# Patient Record
Sex: MALE | Race: BLACK OR AFRICAN AMERICAN | Employment: OTHER | URBAN - METROPOLITAN AREA
[De-identification: names, ages, dates, MRNs, and addresses within clinical notes are randomized per-mention and may not be internally consistent; named-entity substitution may affect disease eponyms.]

---

## 2021-05-06 ENCOUNTER — HOSPITAL ENCOUNTER (OUTPATIENT)
Dept: PREADMISSION TESTING | Age: 71
Discharge: HOME | End: 2021-05-06
Payer: MEDICARE

## 2021-05-06 VITALS
HEART RATE: 76 BPM | BODY MASS INDEX: 28.09 KG/M2 | TEMPERATURE: 97.9 F | OXYGEN SATURATION: 98 % | SYSTOLIC BLOOD PRESSURE: 132 MMHG | DIASTOLIC BLOOD PRESSURE: 78 MMHG | RESPIRATION RATE: 14 BRPM | WEIGHT: 179 LBS | HEIGHT: 67 IN

## 2021-05-06 LAB
25(OH)D3 SERPL-MCNC: 16.6 NG/ML (ref 30–100)
ABO + RH BLD: NORMAL
ALBUMIN SERPL-MCNC: 4.5 G/DL (ref 3.5–5)
ALBUMIN/GLOB SERPL: 1.5 {RATIO} (ref 1.1–2.2)
ALP SERPL-CCNC: 43 U/L (ref 45–117)
ALT SERPL-CCNC: 66 U/L (ref 12–78)
ANION GAP SERPL CALC-SCNC: 5 MMOL/L (ref 5–15)
APPEARANCE UR: CLEAR
APTT PPP: 23.5 SEC (ref 22.1–31)
AST SERPL-CCNC: 39 U/L (ref 15–37)
BACTERIA URNS QL MICRO: NEGATIVE /HPF
BASOPHILS # BLD: 0 K/UL (ref 0–0.1)
BASOPHILS NFR BLD: 1 % (ref 0–1)
BILIRUB SERPL-MCNC: 0.5 MG/DL (ref 0.2–1)
BILIRUB UR QL: NEGATIVE
BLOOD GROUP ANTIBODIES SERPL: NORMAL
BUN SERPL-MCNC: 18 MG/DL (ref 6–20)
BUN/CREAT SERPL: 16 (ref 12–20)
CALCIUM SERPL-MCNC: 9.2 MG/DL (ref 8.5–10.1)
CHLORIDE SERPL-SCNC: 106 MMOL/L (ref 97–108)
CO2 SERPL-SCNC: 27 MMOL/L (ref 21–32)
COLOR UR: ABNORMAL
CREAT SERPL-MCNC: 1.1 MG/DL (ref 0.7–1.3)
DIFFERENTIAL METHOD BLD: ABNORMAL
EOSINOPHIL # BLD: 0.1 K/UL (ref 0–0.4)
EOSINOPHIL NFR BLD: 2 % (ref 0–7)
EPITH CASTS URNS QL MICRO: ABNORMAL /LPF
ERYTHROCYTE [DISTWIDTH] IN BLOOD BY AUTOMATED COUNT: 13.6 % (ref 11.5–14.5)
EST. AVERAGE GLUCOSE BLD GHB EST-MCNC: 103 MG/DL
GLOBULIN SER CALC-MCNC: 3 G/DL (ref 2–4)
GLUCOSE SERPL-MCNC: 111 MG/DL (ref 65–100)
GLUCOSE UR STRIP.AUTO-MCNC: NEGATIVE MG/DL
HBA1C MFR BLD: 5.2 % (ref 4–5.6)
HCT VFR BLD AUTO: 35.3 % (ref 36.6–50.3)
HGB BLD-MCNC: 11.3 G/DL (ref 12.1–17)
HGB UR QL STRIP: NEGATIVE
IMM GRANULOCYTES # BLD AUTO: 0 K/UL (ref 0–0.04)
IMM GRANULOCYTES NFR BLD AUTO: 0 % (ref 0–0.5)
INR PPP: 1.1 (ref 0.9–1.1)
KETONES UR QL STRIP.AUTO: NEGATIVE MG/DL
LEUKOCYTE ESTERASE UR QL STRIP.AUTO: NEGATIVE
LYMPHOCYTES # BLD: 1.2 K/UL (ref 0.8–3.5)
LYMPHOCYTES NFR BLD: 26 % (ref 12–49)
MCH RBC QN AUTO: 31.3 PG (ref 26–34)
MCHC RBC AUTO-ENTMCNC: 32 G/DL (ref 30–36.5)
MCV RBC AUTO: 97.8 FL (ref 80–99)
MONOCYTES # BLD: 0.5 K/UL (ref 0–1)
MONOCYTES NFR BLD: 11 % (ref 5–13)
NEUTS SEG # BLD: 2.6 K/UL (ref 1.8–8)
NEUTS SEG NFR BLD: 60 % (ref 32–75)
NITRITE UR QL STRIP.AUTO: NEGATIVE
NRBC # BLD: 0 K/UL (ref 0–0.01)
NRBC BLD-RTO: 0 PER 100 WBC
PH UR STRIP: 5.5 [PH] (ref 5–8)
PLATELET # BLD AUTO: 243 K/UL (ref 150–400)
PMV BLD AUTO: 10.4 FL (ref 8.9–12.9)
POTASSIUM SERPL-SCNC: 4.6 MMOL/L (ref 3.5–5.1)
PROT SERPL-MCNC: 7.5 G/DL (ref 6.4–8.2)
PROT UR STRIP-MCNC: ABNORMAL MG/DL
PROTHROMBIN TIME: 10.9 SEC (ref 9–11.1)
RBC # BLD AUTO: 3.61 M/UL (ref 4.1–5.7)
RBC #/AREA URNS HPF: ABNORMAL /HPF (ref 0–5)
SODIUM SERPL-SCNC: 138 MMOL/L (ref 136–145)
SP GR UR REFRACTOMETRY: 1.02 (ref 1–1.03)
SPECIMEN EXP DATE BLD: NORMAL
THERAPEUTIC RANGE,PTTT: NORMAL SECS (ref 58–77)
UA: UC IF INDICATED,UAUC: ABNORMAL
UROBILINOGEN UR QL STRIP.AUTO: 0.2 EU/DL (ref 0.2–1)
WBC # BLD AUTO: 4.4 K/UL (ref 4.1–11.1)
WBC URNS QL MICRO: ABNORMAL /HPF (ref 0–4)

## 2021-05-06 PROCEDURE — 86901 BLOOD TYPING SEROLOGIC RH(D): CPT

## 2021-05-06 PROCEDURE — 82306 VITAMIN D 25 HYDROXY: CPT

## 2021-05-06 PROCEDURE — 85610 PROTHROMBIN TIME: CPT

## 2021-05-06 PROCEDURE — 36415 COLL VENOUS BLD VENIPUNCTURE: CPT

## 2021-05-06 PROCEDURE — 83036 HEMOGLOBIN GLYCOSYLATED A1C: CPT

## 2021-05-06 PROCEDURE — 85730 THROMBOPLASTIN TIME PARTIAL: CPT

## 2021-05-06 PROCEDURE — 80053 COMPREHEN METABOLIC PANEL: CPT

## 2021-05-06 PROCEDURE — 85025 COMPLETE CBC W/AUTO DIFF WBC: CPT

## 2021-05-06 PROCEDURE — 81001 URINALYSIS AUTO W/SCOPE: CPT

## 2021-05-06 RX ORDER — CYCLOBENZAPRINE HCL 5 MG
5 TABLET ORAL
COMMUNITY
End: 2021-09-08

## 2021-05-06 RX ORDER — CHOLECALCIFEROL (VITAMIN D3) 50 MCG
1 CAPSULE ORAL DAILY
COMMUNITY

## 2021-05-06 RX ORDER — BISMUTH SUBSALICYLATE 262 MG
1 TABLET,CHEWABLE ORAL DAILY
COMMUNITY

## 2021-05-06 RX ORDER — FINASTERIDE 5 MG/1
5 TABLET, FILM COATED ORAL DAILY
COMMUNITY

## 2021-05-06 RX ORDER — ALLOPURINOL 300 MG/1
600 TABLET ORAL DAILY
COMMUNITY

## 2021-05-06 RX ORDER — SPIRONOLACTONE 25 MG/1
25 TABLET ORAL DAILY
COMMUNITY

## 2021-05-06 RX ORDER — CHOLECALCIFEROL (VITAMIN D3) 125 MCG
1 CAPSULE ORAL
COMMUNITY
End: 2021-05-26

## 2021-05-06 RX ORDER — GARLIC 1000 MG
1 CAPSULE ORAL DAILY
COMMUNITY

## 2021-05-06 RX ORDER — LISINOPRIL 40 MG/1
40 TABLET ORAL DAILY
COMMUNITY
End: 2021-09-08

## 2021-05-06 RX ORDER — DOXAZOSIN 8 MG/1
8 TABLET ORAL DAILY
COMMUNITY

## 2021-05-06 NOTE — PERIOP NOTES
It is now mandated that all surgical patients be tested for COVID-19 exactly 4 days prior to surgery. Your COVID test date is Thursday, 5/13/21, 8am-11am.      You must bring a photo ID. COVID testing will be performed curbside in the [address] [facility] lot. There are signs leading you to the testing site. Please self-quarantine at home after testing and prior to your surgery date. You will only be notified if your results are positive. For Your Information:    · Coronavirus (COVID-19) affects different people in different ways  · It also appears with a wide range of symptoms from mild to severe  · Signs appear 2-14 days after exposure     · If you develop any of the following, notify your doctor immediately:  ? Fever  ? Chills, with or without a shiver  ? Muscle pain  ? Headache  ? Sore throat  ? Dry cough  ? New loss of taste or smell  ? Tiredness     ·  If you develop any of the following, call 911:  ? Shortness of breath  ? Difficulty breathing  ? Chest pain  ? New confusion  ?  Blueness of fingers and/or lips
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
## 2021-05-06 NOTE — PERIOP NOTES
[address] [address] [facility]  PRE-ADMISSION TESTING    [phone]     Surgery Date:   Monday, 5/17/21      [facility] staff will call you between 3 and 7pm the Friday before your surgery with your arrival time. Call [phone] after 7pm Friday if you did not receive this call. INSTRUCTIONS BEFORE YOUR SURGERY   When You  Arrive Please proceed to the 2nd Floor Admitting Desk on the day of your surgery  Have your insurance card, photo ID, and any copayment (if needed)   Food   and   Drink NO food or drink after midnight the night before surgery    This means NO water, gum, mints, coffee, juice, etc.  No alcohol (beer, wine, liquor) 24 hours before or after surgery   Medications to   TAKE   Morning of Surgery    Allopurinol, doxazosin, finasteride   If needed, cyclobenzaprine    Tylenol/acetaminophen products may be taken for pain, if needed   Medications  To  STOP  Monday  5/10/21  Non-Steroidal Anti-Inflammatory Drugs (NSAIDs): Ibuprofen (Advil, Motrin), Naproxen (Aleve)   Aspirin containing products for pain    Herbal supplements, vitamins, and fish oil   Other: This includes your vitamin E & garlic     Special Medication Instruction Do not take your lisinopril or spironolactone the morning of surgery   Bathing Clothing  Jewelry  Valuables      If you shower the morning of surgery, please do not apply anything to your skin (lotions, powders, deodorant, or makeup, especially mascara).  Follow Chlorhexidine Care Fusion body wash instructions provided to you during PAT appointment. Begin 3 days prior to surgery.  Do not shave or trim any body part 24 hours before surgery.  Leave money, valuables, and jewelry, including body piercings, at home.  Wear loose, comfortable clothes. Spending the Night Your doctor is keeping you in the hospital after surgery, leave personal belongings/luggage in your vehicle.  Your support person may bring it up to you after you are settled in your post-operative room. Hospital discharge is noon. Have your  arrive the morning of your discharge. Special Instructions If a situation occurs and you are delayed the day of surgery, call [phone]. If your physical condition changes (like a fever, cold, flu, etc.) call your surgeon. Home medication(s) reviewed and verified with patient's written list during PAT appointment. The patient and caregiver was contacted  in person. The patient verbalizes understanding of all instructions and does not  need reinforcement.

## 2021-05-06 NOTE — PERIOP NOTES
Pt did not stop for ECG prior to leaving [facility] this afternoon. Next planned trip from [place] is 5/13/21 for C4 appt. Discussed possibility of completing ECG on that date since there's no cardiac hx w/TTaylor, NP. This should be acceptable. Pt & daughter agreeable. Will call to remind pt on 5/12/21. 5/12 @ 5162:  Left VM for daughter, Lindsey Esteves, who is bringing pt for C4 tomorrow.  Spoke w/pt yesterday, but wanted to touch base w/Romy to answer questions if she had any r/t directions, registration, etc.

## 2021-05-07 LAB
BACTERIA SPEC CULT: NORMAL
BACTERIA SPEC CULT: NORMAL
SERVICE CMNT-IMP: NORMAL

## 2021-05-07 RX ORDER — CHOLECALCIFEROL TAB 125 MCG (5000 UNIT) 125 MCG
10000 TAB ORAL DAILY
Qty: 60 TAB | Refills: 0 | Status: SHIPPED | OUTPATIENT
Start: 2021-05-07 | End: 2021-06-06

## 2021-05-10 RX ORDER — MUPIROCIN 20 MG/G
OINTMENT TOPICAL 2 TIMES DAILY
Qty: 22 G | Refills: 0 | Status: SHIPPED | OUTPATIENT
Start: 2021-05-10 | End: 2021-05-15

## 2021-05-10 NOTE — PROGRESS NOTES
Notification of Positive MSSA and Treatment Ordered by Preadmission Testing Nurse Practitioner      Patient Name:  Brandie Ray  MRN: [id number]  : 1950    Surgeon: Margarita Triana  Date of surgery: 21  Procedure: Lumbar Laminectomy    Allergies: No Known Allergies    MRSA results: All Micro Results     Procedure Component Value Units Date/Time    MRSA CULTURE NARES [[id number]] Collected: 21 1127    Order Status: Completed Specimen: Nasal from Nares Updated: 21 1442     Special Requests: NO SPECIAL REQUESTS        Culture result:       MRSA NOT PRESENT. Apparent Staphylococus aureus (not MRSA noted). Screening of patient nares for MRSA is for surveillance purposes and, if positive, to facilitate isolation considerations in high risk settings. It is not intended for automatic decolonization interventions per se as regimens are not sufficiently effective to warrant routine use.                 Treatment ordered: Bactroban ointment 2%- apply intranasal twice daily for 5 days    Date patient notified of results / treatment prescribed: 5/10/21    The patient was instructed to add medication and date they began treatment to their \"Prior to Admission Medication\" list given to them in [address], NP

## 2021-05-11 NOTE — PROGRESS NOTES
Called patient to remind him to stop by for ECG on 5/13 while he is on campus for C4 appt. Per technician request, I asked that he register on the 1st floor prior to going back for ECG. Pt verbalized understanding.

## 2021-05-13 ENCOUNTER — HOSPITAL ENCOUNTER (OUTPATIENT)
Dept: NON INVASIVE DIAGNOSTICS | Age: 71
Discharge: HOME | End: 2021-05-13

## 2021-05-13 ENCOUNTER — HOSPITAL ENCOUNTER (OUTPATIENT)
Dept: PREADMISSION TESTING | Age: 71
Discharge: HOME | End: 2021-05-13
Payer: MEDICARE

## 2021-05-13 PROCEDURE — 93005 ELECTROCARDIOGRAM TRACING: CPT

## 2021-05-13 PROCEDURE — U0003 INFECTIOUS AGENT DETECTION BY NUCLEIC ACID (DNA OR RNA); SEVERE ACUTE RESPIRATORY SYNDROME CORONAVIRUS 2 (SARS-COV-2) (CORONAVIRUS DISEASE [COVID-19]), AMPLIFIED PROBE TECHNIQUE, MAKING USE OF HIGH THROUGHPUT TECHNOLOGIES AS DESCRIBED BY CMS-2020-01-R: HCPCS

## 2021-05-14 ENCOUNTER — ANESTHESIA EVENT (OUTPATIENT)
Dept: SURGERY | Age: 71
DRG: 454 | End: 2021-05-14
Payer: MEDICARE

## 2021-05-14 LAB
ATRIAL RATE: 69 BPM
CALCULATED P AXIS, ECG09: 67 DEGREES
CALCULATED R AXIS, ECG10: 43 DEGREES
CALCULATED T AXIS, ECG11: 54 DEGREES
DIAGNOSIS, 93000: NORMAL
P-R INTERVAL, ECG05: 150 MS
Q-T INTERVAL, ECG07: 402 MS
QRS DURATION, ECG06: 80 MS
QTC CALCULATION (BEZET), ECG08: 430 MS
SARS-COV-2, COV2NT: NOT DETECTED
VENTRICULAR RATE, ECG03: 69 BPM

## 2021-05-14 NOTE — PERIOP NOTES
Called patient to verify he had received his nasal swab results and received medication instructions regarding treatment. Patient states he was notified and started treatment Monday, 5/10.

## 2021-05-17 ENCOUNTER — APPOINTMENT (OUTPATIENT)
Dept: GENERAL RADIOLOGY | Age: 71
DRG: 454 | End: 2021-05-17
Attending: ORTHOPAEDIC SURGERY
Payer: MEDICARE

## 2021-05-17 ENCOUNTER — ANESTHESIA (OUTPATIENT)
Dept: SURGERY | Age: 71
DRG: 454 | End: 2021-05-17
Payer: MEDICARE

## 2021-05-17 ENCOUNTER — HOSPITAL ENCOUNTER (INPATIENT)
Age: 71
LOS: 9 days | Discharge: HOME HEALTH | DRG: 454 | End: 2021-05-26
Attending: ORTHOPAEDIC SURGERY | Admitting: ORTHOPAEDIC SURGERY
Payer: MEDICARE

## 2021-05-17 DIAGNOSIS — G89.18 POST-OP PAIN: Primary | ICD-10-CM

## 2021-05-17 PROBLEM — M48.062 LUMBAR STENOSIS WITH NEUROGENIC CLAUDICATION: Status: ACTIVE | Noted: 2021-05-17

## 2021-05-17 PROCEDURE — 77030008684 HC TU ET CUF COVD -B: Performed by: NURSE ANESTHETIST, CERTIFIED REGISTERED

## 2021-05-17 PROCEDURE — 74011250637 HC RX REV CODE- 250/637: Performed by: ORTHOPAEDIC SURGERY

## 2021-05-17 PROCEDURE — 74011250636 HC RX REV CODE- 250/636: Performed by: NURSE ANESTHETIST, CERTIFIED REGISTERED

## 2021-05-17 PROCEDURE — 76060000039 HC ANESTHESIA 4 TO 4.5 HR: Performed by: ORTHOPAEDIC SURGERY

## 2021-05-17 PROCEDURE — C1821 INTERSPINOUS IMPLANT: HCPCS | Performed by: ORTHOPAEDIC SURGERY

## 2021-05-17 PROCEDURE — 77030005513 HC CATH URETH FOL11 MDII -B: Performed by: ORTHOPAEDIC SURGERY

## 2021-05-17 PROCEDURE — 77030018723 HC ELCTRD BLD COVD -A: Performed by: ORTHOPAEDIC SURGERY

## 2021-05-17 PROCEDURE — 77030026188 HC BN CANC CHP CRSH PR LIFV -E: Performed by: ORTHOPAEDIC SURGERY

## 2021-05-17 PROCEDURE — 74011250636 HC RX REV CODE- 250/636: Performed by: ORTHOPAEDIC SURGERY

## 2021-05-17 PROCEDURE — 77030026438 HC STYL ET INTUB CARD -A: Performed by: NURSE ANESTHETIST, CERTIFIED REGISTERED

## 2021-05-17 PROCEDURE — 77030019908 HC STETH ESOPH SIMS -A: Performed by: NURSE ANESTHETIST, CERTIFIED REGISTERED

## 2021-05-17 PROCEDURE — C1713 ANCHOR/SCREW BN/BN,TIS/BN: HCPCS | Performed by: ORTHOPAEDIC SURGERY

## 2021-05-17 PROCEDURE — 77030033067 HC SUT PDO STRATFX SPIR J&J -B: Performed by: ORTHOPAEDIC SURGERY

## 2021-05-17 PROCEDURE — 00NY0ZZ RELEASE LUMBAR SPINAL CORD, OPEN APPROACH: ICD-10-PCS | Performed by: ORTHOPAEDIC SURGERY

## 2021-05-17 PROCEDURE — 77030002982 HC SUT POLYSRB J&J -A: Performed by: ORTHOPAEDIC SURGERY

## 2021-05-17 PROCEDURE — 01NB0ZZ RELEASE LUMBAR NERVE, OPEN APPROACH: ICD-10-PCS | Performed by: ORTHOPAEDIC SURGERY

## 2021-05-17 PROCEDURE — 77030037134 HC WRAP COMPR BACK THER SOLM -B

## 2021-05-17 PROCEDURE — C9290 INJ, BUPIVACAINE LIPOSOME: HCPCS | Performed by: ORTHOPAEDIC SURGERY

## 2021-05-17 PROCEDURE — 76210000017 HC OR PH I REC 1.5 TO 2 HR: Performed by: ORTHOPAEDIC SURGERY

## 2021-05-17 PROCEDURE — 00QT0ZZ REPAIR SPINAL MENINGES, OPEN APPROACH: ICD-10-PCS | Performed by: ORTHOPAEDIC SURGERY

## 2021-05-17 PROCEDURE — 77030039267 HC ADH SKN EXOFIN S2SG -B: Performed by: ORTHOPAEDIC SURGERY

## 2021-05-17 PROCEDURE — 74011000250 HC RX REV CODE- 250: Performed by: ORTHOPAEDIC SURGERY

## 2021-05-17 PROCEDURE — 65270000029 HC RM PRIVATE

## 2021-05-17 PROCEDURE — 77030031139 HC SUT VCRL2 J&J -A: Performed by: ORTHOPAEDIC SURGERY

## 2021-05-17 PROCEDURE — 76010000175 HC OR TIME 4 TO 4.5 HR INTENSV-TIER 1: Performed by: ORTHOPAEDIC SURGERY

## 2021-05-17 PROCEDURE — 2709999900 HC NON-CHARGEABLE SUPPLY: Performed by: ORTHOPAEDIC SURGERY

## 2021-05-17 PROCEDURE — 0SG10AJ FUSION OF 2 OR MORE LUMBAR VERTEBRAL JOINTS WITH INTERBODY FUSION DEVICE, POSTERIOR APPROACH, ANTERIOR COLUMN, OPEN APPROACH: ICD-10-PCS | Performed by: ORTHOPAEDIC SURGERY

## 2021-05-17 PROCEDURE — 77030040466 HC GRFT MATRIX VIBONE REGE -I1: Performed by: ORTHOPAEDIC SURGERY

## 2021-05-17 PROCEDURE — 0SB20ZZ EXCISION OF LUMBAR VERTEBRAL DISC, OPEN APPROACH: ICD-10-PCS | Performed by: ORTHOPAEDIC SURGERY

## 2021-05-17 PROCEDURE — 77030002924 HC SUT GORTX WLGO -B: Performed by: ORTHOPAEDIC SURGERY

## 2021-05-17 PROCEDURE — 74011250636 HC RX REV CODE- 250/636: Performed by: ANESTHESIOLOGY

## 2021-05-17 PROCEDURE — 74011000250 HC RX REV CODE- 250: Performed by: NURSE ANESTHETIST, CERTIFIED REGISTERED

## 2021-05-17 PROCEDURE — 77030013079 HC BLNKT BAIR HGGR 3M -A: Performed by: NURSE ANESTHETIST, CERTIFIED REGISTERED

## 2021-05-17 PROCEDURE — 74011250636 HC RX REV CODE- 250/636: Performed by: PHYSICIAN ASSISTANT

## 2021-05-17 PROCEDURE — 77030003666 HC NDL SPINAL BD -A: Performed by: ORTHOPAEDIC SURGERY

## 2021-05-17 PROCEDURE — 77030004391 HC BUR FLUT MEDT -C: Performed by: ORTHOPAEDIC SURGERY

## 2021-05-17 PROCEDURE — 77030014650 HC SEAL MTRX FLOSEL BAXT -C: Performed by: ORTHOPAEDIC SURGERY

## 2021-05-17 PROCEDURE — 77030040356 HC CORD BPLR FRCP COVD -A: Performed by: ORTHOPAEDIC SURGERY

## 2021-05-17 PROCEDURE — 77030012406 HC DRN WND PENRS BARD -A: Performed by: ORTHOPAEDIC SURGERY

## 2021-05-17 PROCEDURE — 0SG10K1 FUSION OF 2 OR MORE LUMBAR VERTEBRAL JOINTS WITH NONAUTOLOGOUS TISSUE SUBSTITUTE, POSTERIOR APPROACH, POSTERIOR COLUMN, OPEN APPROACH: ICD-10-PCS | Performed by: ORTHOPAEDIC SURGERY

## 2021-05-17 DEVICE — BONE CHIP CANC CRSH 1-8MM 30ML --: Type: IMPLANTABLE DEVICE | Site: SPINE LUMBAR | Status: FUNCTIONAL

## 2021-05-17 DEVICE — 5.5MM CURVED ROD 65MM TI ALLOY
Type: IMPLANTABLE DEVICE | Site: SPINE LUMBAR | Status: FUNCTIONAL
Brand: TAURUS

## 2021-05-17 DEVICE — SCR BNE SPNE 6.5X50MM TI -- STREAMLINE TL: Type: IMPLANTABLE DEVICE | Site: SPINE LUMBAR | Status: FUNCTIONAL

## 2021-05-17 DEVICE — SCR SET SPNE STREAMLINE TL --: Type: IMPLANTABLE DEVICE | Site: SPINE LUMBAR | Status: FUNCTIONAL

## 2021-05-17 DEVICE — SPACER SPNL BULL 22X11X13 MM INTBDY THORLUM PEEK: Type: IMPLANTABLE DEVICE | Site: SPINE LUMBAR | Status: FUNCTIONAL

## 2021-05-17 DEVICE — Z DUP USE 2731790 SUBSTITUTE BNE 10CC VIABLE MTRX VIBNE: Type: IMPLANTABLE DEVICE | Site: SPINE LUMBAR | Status: FUNCTIONAL

## 2021-05-17 DEVICE — DURAGEN® SUTURABLE DURAL REGENERATION MATRIX, 2 IN X 2 IN (5 CM X 5 CM)
Type: IMPLANTABLE DEVICE | Site: SPINE LUMBAR | Status: FUNCTIONAL
Brand: DURAGEN® SUTURABLE

## 2021-05-17 RX ORDER — AMOXICILLIN 250 MG
1 CAPSULE ORAL 2 TIMES DAILY
Status: DISCONTINUED | OUTPATIENT
Start: 2021-05-17 | End: 2021-05-26 | Stop reason: HOSPADM

## 2021-05-17 RX ORDER — POVIDONE-IODINE 10 %
SOLUTION, NON-ORAL TOPICAL AS NEEDED
Status: DISCONTINUED | OUTPATIENT
Start: 2021-05-17 | End: 2021-05-17 | Stop reason: HOSPADM

## 2021-05-17 RX ORDER — PHENYLEPHRINE HCL IN 0.9% NACL 0.4MG/10ML
SYRINGE (ML) INTRAVENOUS AS NEEDED
Status: DISCONTINUED | OUTPATIENT
Start: 2021-05-17 | End: 2021-05-17 | Stop reason: HOSPADM

## 2021-05-17 RX ORDER — ONDANSETRON 2 MG/ML
INJECTION INTRAMUSCULAR; INTRAVENOUS AS NEEDED
Status: DISCONTINUED | OUTPATIENT
Start: 2021-05-17 | End: 2021-05-17 | Stop reason: HOSPADM

## 2021-05-17 RX ORDER — POLYETHYLENE GLYCOL 3350 17 G/17G
17 POWDER, FOR SOLUTION ORAL DAILY
Status: DISCONTINUED | OUTPATIENT
Start: 2021-05-18 | End: 2021-05-22

## 2021-05-17 RX ORDER — FINASTERIDE 5 MG/1
5 TABLET, FILM COATED ORAL DAILY
Status: DISCONTINUED | OUTPATIENT
Start: 2021-05-18 | End: 2021-05-26 | Stop reason: HOSPADM

## 2021-05-17 RX ORDER — VANCOMYCIN HYDROCHLORIDE 1 G/20ML
INJECTION, POWDER, LYOPHILIZED, FOR SOLUTION INTRAVENOUS AS NEEDED
Status: DISCONTINUED | OUTPATIENT
Start: 2021-05-17 | End: 2021-05-17 | Stop reason: HOSPADM

## 2021-05-17 RX ORDER — AMLODIPINE BESYLATE 10 MG/1
10 TABLET ORAL DAILY
COMMUNITY

## 2021-05-17 RX ORDER — ALLOPURINOL 300 MG/1
600 TABLET ORAL DAILY
Status: DISCONTINUED | OUTPATIENT
Start: 2021-05-18 | End: 2021-05-26 | Stop reason: HOSPADM

## 2021-05-17 RX ORDER — NALOXONE HYDROCHLORIDE 0.4 MG/ML
0.4 INJECTION, SOLUTION INTRAMUSCULAR; INTRAVENOUS; SUBCUTANEOUS AS NEEDED
Status: DISCONTINUED | OUTPATIENT
Start: 2021-05-17 | End: 2021-05-26 | Stop reason: HOSPADM

## 2021-05-17 RX ORDER — AMLODIPINE BESYLATE 5 MG/1
10 TABLET ORAL DAILY
Status: DISCONTINUED | OUTPATIENT
Start: 2021-05-18 | End: 2021-05-26 | Stop reason: HOSPADM

## 2021-05-17 RX ORDER — LIDOCAINE HYDROCHLORIDE 10 MG/ML
0.1 INJECTION, SOLUTION EPIDURAL; INFILTRATION; INTRACAUDAL; PERINEURAL AS NEEDED
Status: DISCONTINUED | OUTPATIENT
Start: 2021-05-17 | End: 2021-05-17 | Stop reason: HOSPADM

## 2021-05-17 RX ORDER — SPIRONOLACTONE 25 MG/1
25 TABLET ORAL DAILY
Status: DISCONTINUED | OUTPATIENT
Start: 2021-05-18 | End: 2021-05-26 | Stop reason: HOSPADM

## 2021-05-17 RX ORDER — PROPOFOL 10 MG/ML
INJECTION, EMULSION INTRAVENOUS AS NEEDED
Status: DISCONTINUED | OUTPATIENT
Start: 2021-05-17 | End: 2021-05-17 | Stop reason: HOSPADM

## 2021-05-17 RX ORDER — CHOLECALCIFEROL TAB 125 MCG (5000 UNIT) 125 MCG
10000 TAB ORAL DAILY
Status: DISCONTINUED | OUTPATIENT
Start: 2021-05-18 | End: 2021-05-26 | Stop reason: HOSPADM

## 2021-05-17 RX ORDER — ROCURONIUM BROMIDE 10 MG/ML
INJECTION, SOLUTION INTRAVENOUS AS NEEDED
Status: DISCONTINUED | OUTPATIENT
Start: 2021-05-17 | End: 2021-05-17 | Stop reason: HOSPADM

## 2021-05-17 RX ORDER — SODIUM CHLORIDE, SODIUM LACTATE, POTASSIUM CHLORIDE, CALCIUM CHLORIDE 600; 310; 30; 20 MG/100ML; MG/100ML; MG/100ML; MG/100ML
100 INJECTION, SOLUTION INTRAVENOUS CONTINUOUS
Status: DISCONTINUED | OUTPATIENT
Start: 2021-05-17 | End: 2021-05-17 | Stop reason: HOSPADM

## 2021-05-17 RX ORDER — DIPHENHYDRAMINE HYDROCHLORIDE 50 MG/ML
12.5 INJECTION, SOLUTION INTRAMUSCULAR; INTRAVENOUS
Status: DISCONTINUED | OUTPATIENT
Start: 2021-05-17 | End: 2021-05-26 | Stop reason: HOSPADM

## 2021-05-17 RX ORDER — ONDANSETRON 2 MG/ML
4 INJECTION INTRAMUSCULAR; INTRAVENOUS
Status: ACTIVE | OUTPATIENT
Start: 2021-05-18 | End: 2021-05-19

## 2021-05-17 RX ORDER — SODIUM CHLORIDE 9 MG/ML
125 INJECTION, SOLUTION INTRAVENOUS CONTINUOUS
Status: DISPENSED | OUTPATIENT
Start: 2021-05-17 | End: 2021-05-18

## 2021-05-17 RX ORDER — ONDANSETRON 2 MG/ML
4 INJECTION INTRAMUSCULAR; INTRAVENOUS EVERY 6 HOURS
Status: COMPLETED | OUTPATIENT
Start: 2021-05-17 | End: 2021-05-18

## 2021-05-17 RX ORDER — HYDROMORPHONE HCL/0.9% NACL/PF 0.5 MG/ML
PLASTIC BAG, INJECTION (ML) INTRAVENOUS
Status: DISPENSED | OUTPATIENT
Start: 2021-05-17 | End: 2021-05-18

## 2021-05-17 RX ORDER — ACETAMINOPHEN 325 MG/1
650 TABLET ORAL
Status: DISCONTINUED | OUTPATIENT
Start: 2021-05-17 | End: 2021-05-26 | Stop reason: HOSPADM

## 2021-05-17 RX ORDER — SODIUM CHLORIDE 0.9 % (FLUSH) 0.9 %
5-40 SYRINGE (ML) INJECTION AS NEEDED
Status: DISCONTINUED | OUTPATIENT
Start: 2021-05-17 | End: 2021-05-26 | Stop reason: HOSPADM

## 2021-05-17 RX ORDER — SODIUM CHLORIDE 0.9 % (FLUSH) 0.9 %
5-40 SYRINGE (ML) INJECTION EVERY 8 HOURS
Status: DISCONTINUED | OUTPATIENT
Start: 2021-05-17 | End: 2021-05-26 | Stop reason: HOSPADM

## 2021-05-17 RX ORDER — ONDANSETRON 2 MG/ML
4 INJECTION INTRAMUSCULAR; INTRAVENOUS
Status: DISCONTINUED | OUTPATIENT
Start: 2021-05-17 | End: 2021-05-17

## 2021-05-17 RX ORDER — LISINOPRIL 20 MG/1
40 TABLET ORAL DAILY
Status: DISCONTINUED | OUTPATIENT
Start: 2021-05-18 | End: 2021-05-26 | Stop reason: HOSPADM

## 2021-05-17 RX ORDER — CYCLOBENZAPRINE HCL 10 MG
10 TABLET ORAL
Status: DISCONTINUED | OUTPATIENT
Start: 2021-05-17 | End: 2021-05-26 | Stop reason: HOSPADM

## 2021-05-17 RX ORDER — DOXAZOSIN 2 MG/1
8 TABLET ORAL DAILY
Status: DISCONTINUED | OUTPATIENT
Start: 2021-05-18 | End: 2021-05-26 | Stop reason: HOSPADM

## 2021-05-17 RX ORDER — BUTALBITAL, ACETAMINOPHEN AND CAFFEINE 50; 325; 40 MG/1; MG/1; MG/1
1 TABLET ORAL
Status: DISCONTINUED | OUTPATIENT
Start: 2021-05-17 | End: 2021-05-26 | Stop reason: HOSPADM

## 2021-05-17 RX ORDER — DEXAMETHASONE SODIUM PHOSPHATE 4 MG/ML
INJECTION, SOLUTION INTRA-ARTICULAR; INTRALESIONAL; INTRAMUSCULAR; INTRAVENOUS; SOFT TISSUE AS NEEDED
Status: DISCONTINUED | OUTPATIENT
Start: 2021-05-17 | End: 2021-05-17 | Stop reason: HOSPADM

## 2021-05-17 RX ORDER — FACIAL-BODY WIPES
10 EACH TOPICAL DAILY PRN
Status: DISCONTINUED | OUTPATIENT
Start: 2021-05-19 | End: 2021-05-26 | Stop reason: HOSPADM

## 2021-05-17 RX ORDER — FENTANYL CITRATE 50 UG/ML
INJECTION, SOLUTION INTRAMUSCULAR; INTRAVENOUS AS NEEDED
Status: DISCONTINUED | OUTPATIENT
Start: 2021-05-17 | End: 2021-05-17 | Stop reason: HOSPADM

## 2021-05-17 RX ORDER — BUTALBITAL, ACETAMINOPHEN AND CAFFEINE 50; 325; 40 MG/1; MG/1; MG/1
2 TABLET ORAL
Status: DISCONTINUED | OUTPATIENT
Start: 2021-05-17 | End: 2021-05-26 | Stop reason: HOSPADM

## 2021-05-17 RX ORDER — HYDROMORPHONE HYDROCHLORIDE 1 MG/ML
0.5 INJECTION, SOLUTION INTRAMUSCULAR; INTRAVENOUS; SUBCUTANEOUS
Status: ACTIVE | OUTPATIENT
Start: 2021-05-17 | End: 2021-05-18

## 2021-05-17 RX ORDER — SUFENTANIL CITRATE 50 UG/ML
INJECTION EPIDURAL; INTRAVENOUS
Status: DISCONTINUED | OUTPATIENT
Start: 2021-05-17 | End: 2021-05-17 | Stop reason: HOSPADM

## 2021-05-17 RX ORDER — OXYCODONE HYDROCHLORIDE 5 MG/1
10 TABLET ORAL
Status: DISCONTINUED | OUTPATIENT
Start: 2021-05-17 | End: 2021-05-26 | Stop reason: HOSPADM

## 2021-05-17 RX ORDER — OXYCODONE HYDROCHLORIDE 5 MG/1
5 TABLET ORAL
Status: DISCONTINUED | OUTPATIENT
Start: 2021-05-17 | End: 2021-05-26 | Stop reason: HOSPADM

## 2021-05-17 RX ORDER — HYDROMORPHONE HYDROCHLORIDE 1 MG/ML
.25-1 INJECTION, SOLUTION INTRAMUSCULAR; INTRAVENOUS; SUBCUTANEOUS
Status: DISCONTINUED | OUTPATIENT
Start: 2021-05-17 | End: 2021-05-17 | Stop reason: HOSPADM

## 2021-05-17 RX ORDER — PROPOFOL 10 MG/ML
VIAL (ML) INTRAVENOUS
Status: DISCONTINUED | OUTPATIENT
Start: 2021-05-17 | End: 2021-05-17 | Stop reason: HOSPADM

## 2021-05-17 RX ORDER — FAMOTIDINE 20 MG/1
20 TABLET, FILM COATED ORAL 2 TIMES DAILY
Status: DISCONTINUED | OUTPATIENT
Start: 2021-05-17 | End: 2021-05-26 | Stop reason: HOSPADM

## 2021-05-17 RX ORDER — SUCCINYLCHOLINE CHLORIDE 20 MG/ML
INJECTION INTRAMUSCULAR; INTRAVENOUS AS NEEDED
Status: DISCONTINUED | OUTPATIENT
Start: 2021-05-17 | End: 2021-05-17 | Stop reason: HOSPADM

## 2021-05-17 RX ORDER — LIDOCAINE HYDROCHLORIDE 20 MG/ML
INJECTION, SOLUTION EPIDURAL; INFILTRATION; INTRACAUDAL; PERINEURAL AS NEEDED
Status: DISCONTINUED | OUTPATIENT
Start: 2021-05-17 | End: 2021-05-17 | Stop reason: HOSPADM

## 2021-05-17 RX ADMIN — Medication: at 16:33

## 2021-05-17 RX ADMIN — LIDOCAINE HYDROCHLORIDE 100 MG: 20 INJECTION, SOLUTION EPIDURAL; INFILTRATION; INTRACAUDAL; PERINEURAL at 11:22

## 2021-05-17 RX ADMIN — Medication 10 ML: at 21:00

## 2021-05-17 RX ADMIN — FENTANYL CITRATE 25 MCG: 0.05 INJECTION, SOLUTION INTRAMUSCULAR; INTRAVENOUS at 11:18

## 2021-05-17 RX ADMIN — PROPOFOL 150 MG: 10 INJECTION, EMULSION INTRAVENOUS at 11:22

## 2021-05-17 RX ADMIN — PROPOFOL 125 MCG/KG/MIN: 10 INJECTION, EMULSION INTRAVENOUS at 11:46

## 2021-05-17 RX ADMIN — FAMOTIDINE 20 MG: 20 TABLET, FILM COATED ORAL at 18:10

## 2021-05-17 RX ADMIN — Medication 50 MCG: at 11:22

## 2021-05-17 RX ADMIN — DEXAMETHASONE SODIUM PHOSPHATE 8 MG: 4 INJECTION, SOLUTION INTRAMUSCULAR; INTRAVENOUS at 11:33

## 2021-05-17 RX ADMIN — SUFENTANIL CITRATE 0.3 MCG/KG/HR: 50 INJECTION EPIDURAL; INTRAVENOUS at 11:22

## 2021-05-17 RX ADMIN — ONDANSETRON 4 MG: 2 INJECTION INTRAMUSCULAR; INTRAVENOUS at 18:08

## 2021-05-17 RX ADMIN — CEFAZOLIN 2 G: 1 INJECTION, POWDER, FOR SOLUTION INTRAMUSCULAR; INTRAVENOUS at 18:08

## 2021-05-17 RX ADMIN — SODIUM CHLORIDE 125 ML/HR: 9 INJECTION, SOLUTION INTRAVENOUS at 16:33

## 2021-05-17 RX ADMIN — ROCURONIUM BROMIDE 5 MG: 10 INJECTION INTRAVENOUS at 11:22

## 2021-05-17 RX ADMIN — Medication 10 ML: at 17:20

## 2021-05-17 RX ADMIN — ONDANSETRON HYDROCHLORIDE 4 MG: 2 SOLUTION INTRAMUSCULAR; INTRAVENOUS at 14:18

## 2021-05-17 RX ADMIN — CEFAZOLIN SODIUM 2 G: 1 POWDER, FOR SOLUTION INTRAMUSCULAR; INTRAVENOUS at 11:30

## 2021-05-17 RX ADMIN — SODIUM CHLORIDE, POTASSIUM CHLORIDE, SODIUM LACTATE AND CALCIUM CHLORIDE: 600; 310; 30; 20 INJECTION, SOLUTION INTRAVENOUS at 14:18

## 2021-05-17 RX ADMIN — DOCUSATE SODIUM 50MG AND SENNOSIDES 8.6MG 1 TABLET: 8.6; 5 TABLET, FILM COATED ORAL at 18:08

## 2021-05-17 RX ADMIN — SUCCINYLCHOLINE CHLORIDE 100 MG: 20 INJECTION, SOLUTION INTRAMUSCULAR; INTRAVENOUS at 11:22

## 2021-05-17 RX ADMIN — FENTANYL CITRATE 75 MCG: 0.05 INJECTION, SOLUTION INTRAMUSCULAR; INTRAVENOUS at 11:22

## 2021-05-17 RX ADMIN — SODIUM CHLORIDE, POTASSIUM CHLORIDE, SODIUM LACTATE AND CALCIUM CHLORIDE 100 ML/HR: 600; 310; 30; 20 INJECTION, SOLUTION INTRAVENOUS at 08:14

## 2021-05-17 NOTE — OP NOTES
[address], [address] [facility]    OPERATIVE REPORT      NAME: Martina Conway    AGE: 69 y.o. YOB: 1950    MEDICAL RECORD NUMBER: [id number]    DATE OF SURGERY: 5/17/2021    PREOPERATIVE DIAGNOSES:  1. Lumbar stenosis  2. Acquired lumbar spondylolisthesis    POSTOPERATIVE DIAGNOSES:  1. Lumbar stenosis   2. Acquired lumbar spondylolisthesis     OPERATIVE PROCEDURES:   1. Laminectomy, partial facetectomy, and foraminotomy of L3, L4, and L5.  2. Posterolateral fusion and posterior lumbar interbody fusion of L3-L4. 3. Posterolateral fusion and posterior lumbar interbody fusion of L4-L5. 4. Pedicle screw instrumentation with RTI pedicle screws for L3, L4, and L5 bilaterally. 5. Application of biomechanical interbody device at L4-L5 and L3-L4. 6. Morselized allograft for spine surgery and local autograft for spine surgery with stem cells. SURGEON: Reji Olivia MD     ASSISTANT: PILY Thornton    ANESTHESIA: General    Specimens - none    COMPLICATIONS: CSF leak on the right at L4-5, repaired with suture and Duragen    NEUROMONITORING: We used SSEPs and spontaneous EMGs. We also stimulated the pedicle screws. ESTIMATED BLOOD LOSS: 250 cc    INDICATION FOR PROCEDURE: The patient is a very pleasant 69 y.o. male with debilitating back pain and leg pain. He failed conservative measures. He elected to proceed with operative intervention. He was aware of the risks, benefits, and alternatives. He provided informed consent. PROCEDURE: The patient was identified in the preoperative holding area. His lumbar spine was marked by me. He was transferred to the operating room where general anesthesia was given. He was also given perioperative antibiotics. He was placed prone on the [facility] table. All bony prominences were well padded. The lumbar spine was prepped and draped in the usual standard fashion. We performed a surgical timeout. I made a skin incision in the midline. I exposed the posterior lumbar spine. I took intraoperative fluoroscopy to verify our levels. I exposed the transverse processes of L3, L4, and L5 bilaterally. I then began my decompression by performing a laminectomy of L3, L4, and L5. I also performed a partial facetectomy and foraminotomy to decompress the thecal sac and the roots of L3, L4, and L5 bilaterally. I performed a transforaminal approach to L5 on the right side with exposure of the right L4-L5 disk space by widening our partial facetectomy and foraminotomy at L4-L5. I performed an identical transforaminal approach to L4 on the right side with exposure of the right L3-L4 disk space. I then performed a standard diskectomy at L4-L5 and L3-L4. I prepared the endplates to bleeding bone. I performed trial sizing. I placed the appropriately sized biomechanical device into L4-L5 and into L3-L4 with the appropriate amount of tension and alignment. The biomechanical devices were packed with autograft and morselized allograft. I then cannulated our pedicles for L3, L4, and L5 bilaterally in standard fashion. I probed each pedicle. I copiously irrigated the entire wound. I decorticated our fusion beds bilaterally with a high speed bur. I placed our bone graft into our fusion beds bilaterally. I then placed pedicle screws for L3, L4, and L5 bilaterally in standard fashion under fluoroscopic guidance. I had good purchase for each pedicle screw. I stimulated all the screws with pedicle screw stimulation. The pedicle screws were found to be within the appropriate range of amplitude. I then placed contoured rods on top of the pedicle screws bilaterally. The rods were locked to the screws according to the 's specification. We had good hemostasis. There was a CSF leak on the right side at L4-L5. It was repaired with suture and Duragen.  The fusion beds were packed with morselized allograft and local autograft. The exposed neurologic elements were protected with Duragen. I injected the soft tissues with a pain management cocktail. A deep drain was placed. The wound was closed in several layers. A sterile dressing was applied. The patient was extubated and transferred to the recovery room in good medical condition. The PA assisted with retraction and wound closure    I, Dr. Abi Esparza, performed the above procedure.      Abi Esparza MD  5/17/2021

## 2021-05-17 NOTE — ANESTHESIA POSTPROCEDURE EVALUATION
Procedure(s):  L3-L5 LAMINECTOMY, FUSION, INSTRUMENTATION, TLIF, BONE GRAFT. general, total IV anesthesia    Anesthesia Post Evaluation      Multimodal analgesia: multimodal analgesia used between 6 hours prior to anesthesia start to PACU discharge  Patient location during evaluation: PACU  Patient participation: complete - patient participated  Level of consciousness: awake and alert  Pain management: adequate  Airway patency: patent  Anesthetic complications: no  Cardiovascular status: acceptable  Respiratory status: acceptable  Hydration status: acceptable  Post anesthesia nausea and vomiting:  none  Final Post Anesthesia Temperature Assessment:  Normothermia (36.0-37.5 degrees C)      INITIAL Post-op Vital signs:   Vitals Value Taken Time   /73 05/17/21 1535   Temp 36.4 °C (97.5 °F) 05/17/21 1526   Pulse 78 05/17/21 1539   Resp 11 05/17/21 1539   SpO2 99 % 05/17/21 1539   Vitals shown include unvalidated device data.

## 2021-05-17 NOTE — PERIOP NOTES
TRANSFER - OUT REPORT:    Verbal report given to TEODORA Hernandez on Kajal Fears  being transferred to [facility] for routine post - op       Report consisted of patients Situation, Background, Assessment and   Recommendations(SBAR). Information from the following report(s) SBAR, OR Summary, Intake/Output, MAR and Cardiac Rhythm NSR was reviewed with the receiving nurse. Lines:   Peripheral IV 05/17/21 Right Arm (Active)   Site Assessment Clean, dry, & intact 05/17/21 1536   Phlebitis Assessment 0 05/17/21 1536   Infiltration Assessment 0 05/17/21 1536   Dressing Status Clean, dry, & intact 05/17/21 1536   Dressing Type Transparent 05/17/21 1536   Hub Color/Line Status Pink 05/17/21 1536   Action Taken Open ports on tubing capped 05/17/21 0814   Alcohol Cap Used Yes 05/17/21 0814        Opportunity for questions and clarification was provided.       Patient transported with:   O2 @ 3 liters  Registered Nurse  [facility]

## 2021-05-17 NOTE — H&P
Date of Surgery Update:  Trenton Gomez was seen and examined. History and physical has been reviewed. The patient has been examined.  There have been no significant clinical changes since the completion of the originally dated History and Physical.    Signed By: Anil Nunn MD     May 17, 2021 9:46 AM

## 2021-05-18 LAB
ANION GAP SERPL CALC-SCNC: 5 MMOL/L (ref 5–15)
BUN SERPL-MCNC: 15 MG/DL (ref 6–20)
BUN/CREAT SERPL: 13 (ref 12–20)
CALCIUM SERPL-MCNC: 7.6 MG/DL (ref 8.5–10.1)
CHLORIDE SERPL-SCNC: 108 MMOL/L (ref 97–108)
CO2 SERPL-SCNC: 23 MMOL/L (ref 21–32)
CREAT SERPL-MCNC: 1.2 MG/DL (ref 0.7–1.3)
GLUCOSE SERPL-MCNC: 145 MG/DL (ref 65–100)
HGB BLD-MCNC: 9.5 G/DL (ref 12.1–17)
POTASSIUM SERPL-SCNC: 4.4 MMOL/L (ref 3.5–5.1)
SODIUM SERPL-SCNC: 136 MMOL/L (ref 136–145)

## 2021-05-18 PROCEDURE — 74011250637 HC RX REV CODE- 250/637: Performed by: ORTHOPAEDIC SURGERY

## 2021-05-18 PROCEDURE — 85018 HEMOGLOBIN: CPT

## 2021-05-18 PROCEDURE — 77010033678 HC OXYGEN DAILY

## 2021-05-18 PROCEDURE — 74011250636 HC RX REV CODE- 250/636: Performed by: PHYSICIAN ASSISTANT

## 2021-05-18 PROCEDURE — 94760 N-INVAS EAR/PLS OXIMETRY 1: CPT

## 2021-05-18 PROCEDURE — 77030040922 HC BLNKT HYPOTHRM STRY -A

## 2021-05-18 PROCEDURE — 65270000029 HC RM PRIVATE

## 2021-05-18 PROCEDURE — 36415 COLL VENOUS BLD VENIPUNCTURE: CPT

## 2021-05-18 PROCEDURE — 74011250636 HC RX REV CODE- 250/636: Performed by: ORTHOPAEDIC SURGERY

## 2021-05-18 PROCEDURE — 97530 THERAPEUTIC ACTIVITIES: CPT | Performed by: PHYSICAL THERAPIST

## 2021-05-18 PROCEDURE — 97112 NEUROMUSCULAR REEDUCATION: CPT | Performed by: PHYSICAL THERAPIST

## 2021-05-18 PROCEDURE — 80048 BASIC METABOLIC PNL TOTAL CA: CPT

## 2021-05-18 PROCEDURE — 97161 PT EVAL LOW COMPLEX 20 MIN: CPT | Performed by: PHYSICAL THERAPIST

## 2021-05-18 PROCEDURE — 77030040361 HC SLV COMPR DVT MDII -B

## 2021-05-18 PROCEDURE — 74011250636 HC RX REV CODE- 250/636: Performed by: NURSE PRACTITIONER

## 2021-05-18 PROCEDURE — 74011000250 HC RX REV CODE- 250: Performed by: ORTHOPAEDIC SURGERY

## 2021-05-18 RX ORDER — DEXAMETHASONE SODIUM PHOSPHATE 10 MG/ML
10 INJECTION INTRAMUSCULAR; INTRAVENOUS ONCE
Status: COMPLETED | OUTPATIENT
Start: 2021-05-18 | End: 2021-05-18

## 2021-05-18 RX ADMIN — ONDANSETRON 4 MG: 2 INJECTION INTRAMUSCULAR; INTRAVENOUS at 11:13

## 2021-05-18 RX ADMIN — Medication 10 ML: at 15:18

## 2021-05-18 RX ADMIN — SODIUM CHLORIDE 125 ML/HR: 9 INJECTION, SOLUTION INTRAVENOUS at 00:19

## 2021-05-18 RX ADMIN — CEFAZOLIN 2 G: 1 INJECTION, POWDER, FOR SOLUTION INTRAMUSCULAR; INTRAVENOUS at 09:56

## 2021-05-18 RX ADMIN — OXYCODONE 5 MG: 5 TABLET ORAL at 17:06

## 2021-05-18 RX ADMIN — DEXAMETHASONE SODIUM PHOSPHATE 10 MG: 10 INJECTION, SOLUTION INTRAMUSCULAR; INTRAVENOUS at 15:18

## 2021-05-18 RX ADMIN — SPIRONOLACTONE 25 MG: 25 TABLET ORAL at 09:57

## 2021-05-18 RX ADMIN — CEFAZOLIN 2 G: 1 INJECTION, POWDER, FOR SOLUTION INTRAMUSCULAR; INTRAVENOUS at 03:17

## 2021-05-18 RX ADMIN — Medication 10000 UNITS: at 09:58

## 2021-05-18 RX ADMIN — Medication 10 ML: at 22:00

## 2021-05-18 RX ADMIN — DOCUSATE SODIUM 50MG AND SENNOSIDES 8.6MG 1 TABLET: 8.6; 5 TABLET, FILM COATED ORAL at 17:06

## 2021-05-18 RX ADMIN — Medication 10 ML: at 06:17

## 2021-05-18 RX ADMIN — AMLODIPINE BESYLATE 10 MG: 5 TABLET ORAL at 09:57

## 2021-05-18 RX ADMIN — ONDANSETRON 4 MG: 2 INJECTION INTRAMUSCULAR; INTRAVENOUS at 00:19

## 2021-05-18 RX ADMIN — FAMOTIDINE 20 MG: 20 TABLET, FILM COATED ORAL at 09:58

## 2021-05-18 RX ADMIN — ONDANSETRON 4 MG: 2 INJECTION INTRAMUSCULAR; INTRAVENOUS at 06:17

## 2021-05-18 RX ADMIN — FAMOTIDINE 20 MG: 20 TABLET, FILM COATED ORAL at 17:06

## 2021-05-18 RX ADMIN — POLYETHYLENE GLYCOL 3350 17 G: 17 POWDER, FOR SOLUTION ORAL at 09:57

## 2021-05-18 RX ADMIN — ALLOPURINOL 600 MG: 300 TABLET ORAL at 09:58

## 2021-05-18 RX ADMIN — Medication: at 07:45

## 2021-05-18 RX ADMIN — DOCUSATE SODIUM 50MG AND SENNOSIDES 8.6MG 1 TABLET: 8.6; 5 TABLET, FILM COATED ORAL at 09:57

## 2021-05-18 RX ADMIN — LISINOPRIL 40 MG: 20 TABLET ORAL at 09:57

## 2021-05-18 RX ADMIN — FINASTERIDE 5 MG: 5 TABLET, FILM COATED ORAL at 09:57

## 2021-05-18 RX ADMIN — DOXAZOSIN 8 MG: 2 TABLET ORAL at 09:58

## 2021-05-18 NOTE — PROGRESS NOTES
Ortho Spine    Rounded with Dr. Jake Viramontes this morning. He states patient may slowly rise HOB as tolerated today. If no headache, may get OOB with therapy this afternoon. Will place orders accordingly.      Ismael Mccann NP

## 2021-05-18 NOTE — PROGRESS NOTES
Nutrition Note    5/18: MST received- \"no\" to eating poorly and \"no\" to recent weight loss. Will remove MST at this time.     Electronically signed by Anu March RDN on 5/18/2021 at 7:45 AM    Contact: [phone]

## 2021-05-18 NOTE — PROGRESS NOTES
ORTHOPAEDIC LUMBAR FUSION PROGRESS NOTE    NAME:     Audleia Law   :       1950   MRN:       [id number]   DATE:      2021    POD:    1 Day Post-Op  S/P:    Procedure(s):  L3-L5 LAMINECTOMY, FUSION, INSTRUMENTATION, TLIF, BONE GRAFT    SUBJECTIVE:    C/O back pain along surgical incision  No leg pain or numbness  Denies nausea/vomiting, headache, photophobia, chest pain or shortness of breath  Pain controlled    Recent Labs     21  0318   HGB 9.5*      K 4.4      CO2 23   BUN 15   CREA 1.20   *     Patient Vitals for the past 12 hrs:   BP Temp Pulse Resp SpO2   21 1117 (!) 158/82 99 °F (37.2 °C) 72 16 98 %   21 0728 (!) 141/76 98.6 °F (37 °C) 78 18 98 %   21 0403 (!) 146/86 98.4 °F (36.9 °C) 75 17 97 %       EXAM:  Dressings clean, dry and intact   Positive strength/ROM bilat lower ext.   Neuro intact to sensation  Calves, soft & nontender  BL LEs NVID      PLAN:  Elevate HOB trial per order  PCA and ricardo to remain in place until pt is cleared to get up with PT as discussed with floor RN  PT/OT, OOB w/ assist if passes Elevate HOB trial  Advance diet as tolerated  Monitor hemovac drain output      Dino Alvarez [place]  Orthopaedic Surgery  Physician Assistant to Dr. Bradley Wallis

## 2021-05-18 NOTE — PROGRESS NOTES
1936- rec'd bedside report from TEODORA Gonzalez to include SBAR, Kardex, and MAR. PCA checked, pt. Lying flat with call bell in reach, bed in lowest position and locked, and bedside table and belongings within reach. 2010- un-kinked tubing to hemovac.    5/18/21 at 0600- pt. Drain putting out significant amount of sanguineous drainage (190ml at 2212 and 200ml at 0615).

## 2021-05-18 NOTE — PROGRESS NOTES
Reason for Admission:  Lumbar stenosis with neurogenic claudication                   RUR Score:  9%                   Plan for utilizing home health:     yes - choice letter signed and referral placed to [facility]    PCP: First and Last name:  Suki English MD   Name of Practice:    Are you a current patient: Yes/No: yes   Approximate date of last visit: March/April   Can you participate in a virtual visit with your PCP: unknown                    Current Advanced Directive/Advance Care Plan: No Order    Healthcare Decision Maker:  Malika Moran, daughter - [phone]                          Transition of Care Plan:  CM met with patient for discharge planning. Patient lives alone in a one story ranch style home with a basement. There are three steps to enter. Patient's daughter will be staying with patient post discharge to provide assistance. Patient has a cane; no other DME reported. His preferred pharmacy is [facility] in [place]. 1. CM to follow  2. Home with family assistance and home health; referral sent to [facility]  3. Patient's daughter will transport at discharge  4. Outpatient follow-up    Care Management Interventions  PCP Verified by CM:  Yes  Transition of Care Consult (CM Consult): [address]: No  Reason Outside [place]: Out of service area  Current Support Network: Lives Alone  Confirm Follow Up Transport: Family  The Plan for Transition of Care is Related to the Following Treatment Goals : Spondylolisthesis, lumbar region spinal stenosis of lumbar region with neurogenic claudication  Discharge Location  Discharge Placement: Home with home health     Medicine [facility]

## 2021-05-18 NOTE — PROGRESS NOTES
Problem: Falls - Risk of  Goal: *Absence of Falls  Description: Document Varun [place] Fall Risk and appropriate interventions in the flowsheet. Outcome: Progressing Towards Goal  Note: Fall Risk Interventions:  Mobility Interventions: PT Consult for mobility concerns         Medication Interventions: Bed/chair exit alarm    Elimination Interventions: Bed/chair exit alarm, Patient to call for help with toileting needs              Problem: Patient Education: Go to Patient Education Activity  Goal: Patient/Family Education  Outcome: Progressing Towards Goal     Problem: Pressure Injury - Risk of  Goal: *Prevention of pressure injury  Description: Document Tyrone Scale and appropriate interventions in the flowsheet. Outcome: Progressing Towards Goal  Note: Pressure Injury Interventions:             Activity Interventions: PT/OT evaluation    Mobility Interventions: PT/OT evaluation    Nutrition Interventions: Offer support with meals,snacks and hydration, Document food/fluid/supplement intake    Friction and Shear Interventions: Minimize layers

## 2021-05-18 NOTE — PROGRESS NOTES
Problem: Mobility Impaired (Adult and Pediatric)  Goal: *Acute Goals and Plan of Care (Insert Text)  Description: FUNCTIONAL STATUS PRIOR TO ADMISSION: indep; used straight cane due to LE pain    HOME SUPPORT PRIOR TO ADMISSION: The patient lived alone with no local support. Physical Therapy Goals  Initiated 5/18/2021    1. Patient will move from supine to sit and sit to supine  in bed with modified independence within 4 days. 2. Patient will perform sit to stand with modified independence within 4 days. 3. Patient will ambulate with modified independence for 150 feet with the least restrictive device within 4 days. 4. Patient will ascend/descend 3 stairs with 1 handrail(s) with minimal assistance/contact guard assist within 4 days. 5. Patient will verbalize and demonstrate understanding of spinal precautions (No bending, lifting greater than 5 lbs, or twisting; log-roll technique; frequent repositioning as instructed) within 4 days. Outcome: Progressing Towards Goal   PHYSICAL THERAPY EVALUATION  Patient: Belinda Marquez (65 y.o. male)  Date: 5/18/2021  Primary Diagnosis: Lumbar stenosis with neurogenic claudication [M48.062]  Procedure(s) (LRB):  L3-L5 LAMINECTOMY, FUSION, INSTRUMENTATION, TLIF, BONE GRAFT (N/A) 1 Day Post-Op   Precautions: fall; back       ASSESSMENT  Based on the objective data described below, the patient presents with limited mobility due to recent L3-5 laminectomy. No sensory aberrations LEs and pain inconsequential.  Excellent first session re mobilization. Tolerated sitting, standing, amb with rolling walker in room. Family present. All ed re precautions; appropriate techniques for sit-stand, rolling; bed ex. Patient should do well. Current Level of Function Impacting Discharge (mobility/balance): requiring extensive education & practice with precautions; needs training in use of walker    Functional Outcome Measure:   The patient scored 50 on the barthel outcome measure which is indicative of need for assistance with ADLs and mobility. Other factors to consider for discharge: living alone     Patient will benefit from skilled therapy intervention to address the above noted impairments. PLAN :  Recommendations and Planned Interventions: bed mobility training, transfer training, gait training, therapeutic exercises, neuromuscular re-education, patient and family training/education, and therapeutic activities      Frequency/Duration: Patient will be followed by physical therapy:  daily to address goals. Recommendation for discharge: (in order for the patient to meet his/her long term goals)  To be determined: possibly home health    This discharge recommendation:  Has not yet been discussed the attending provider and/or case management    IF patient discharges home will need the following DME: rolling walker         SUBJECTIVE:   Patient stated no, I never used a walker. 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    Nares     Past Surgical History:   Procedure Laterality Date    HX CATARACT REMOVAL Right     HX COLONOSCOPY         Personal factors and/or comorbidities impacting plan of care: see above    Home Situation  Home Environment: Private residence  # Steps to Enter: 3  One/Two Story Residence: One story  Living Alone: Yes  Support Systems: Friends \ neighbors, Family member(s)  Patient Expects to be Discharged to[de-identified] Private residence  Current DME Used/Available at Home: Cane, jose luis    EXAMINATION/PRESENTATION/DECISION MAKING:   Critical Behavior:  Neurologic State: Alert  Orientation Level: Oriented X4  Cognition: Follows commands     Hearing:   Auditory  Auditory Impairment: None  Hearing Aids/Status: Other (comment)  Skin:  clean incisional area with drain in place  Edema: none of major significance  Range Of Motion:  AROM: Within functional limits(x trunk)                       Strength:    Strength: Within functional limits(x trunk)                    Tone & Sensation:   Tone: Normal              Sensation: Intact               Coordination:  Coordination: Within functional limits  Vision:      Functional Mobility:  Bed Mobility:  Rolling: Minimum assistance  Supine to Sit: Minimum assistance  Sit to Supine: Minimum assistance  Scooting: Stand-by assistance  Transfers:  Sit to Stand: Contact guard assistance(cues)  Stand to Sit: Contact guard assistance                       Balance:   Sitting: Intact  Standing: Intact; With support  Ambulation/Gait Training:  Distance (ft): 20 Feet (ft)  Assistive Device: Walker, rolling  Ambulation - Level of Assistance: Contact guard assistance(occ min A with walker)        Gait Abnormalities: Decreased step clearance        Base of Support: Widened  Stance: Left decreased  Speed/Zandra: Slow  Step Length: Right shortened;Left shortened                 Neuro/ Therapeutic Exercises:   Training in use of sphincters, deep core mm for stabilization  Log rolling technique  Ankle pumping  Heel sliding  Shoulder flexion with deep breathing   Use of repirometer    Functional Measure:  Barthel Index:    Bathin  Bladder: 10  Bowels: 10  Groomin  Dressin  Feeding: 10  Mobility: 0  Stairs: 0  Toilet Use: 5  Transfer (Bed to Chair and Back): 10  Total: 50/100       The Barthel ADL Index: Guidelines  1. The index should be used as a record of what a patient does, not as a record of what a patient could do. 2. The main aim is to establish degree of independence from any help, physical or verbal, however minor and for whatever reason. 3. The need for supervision renders the patient not independent. 4. A patient's performance should be established using the best available evidence. Asking the patient, friends/relatives and nurses are the usual sources, but direct observation and common sense are also important. However direct testing is not needed. 5. Usually the patient's performance over the preceding 24-48 hours is important, but occasionally longer periods will be relevant. 6. Middle categories imply that the patient supplies over 50 per cent of the effort. 7. Use of aids to be independent is allowed. Sanket Hi, Barthel, D.W. (1129). Functional evaluation: the Barthel Index. [facility] (14)2. KHURRAM Leonardo, Mandi Small., Sandeep Nathan., [place], [facility] (1999). Measuring the change indisability after inpatient rehabilitation; comparison of the responsiveness of the Barthel Index and Functional [place] Measure. Journal of Neurology, Neurosurgery, and Psychiatry, 66(4), 793-688. Kim Can, NJoseJ.A, LÁZARO Woo, & Campbell Ayala M.A. (2004.) Assessment of post-stroke quality of life in cost-effectiveness studies: The usefulness of the Barthel Index and the EuroQoL-5D. Quality of Life Research, 15, 497-53           Physical Therapy Evaluation Charge Determination   History Examination Presentation Decision-Making   LOW Complexity : Zero comorbidities / personal factors that will impact the outcome / POC LOW Complexity : 1-2 Standardized tests and measures addressing body structure, function, activity limitation and / or participation in recreation  LOW Complexity : Stable, uncomplicated  Other outcome measures barthel  LOW       Based on the above components, the patient evaluation is determined to be of the following complexity level: LOW     Pain Rating:  'low'    Activity Tolerance:   Good    After treatment patient left in no apparent distress:   Supine in bed with family present    COMMUNICATION/EDUCATION:   The patients plan of care was discussed with: Registered nurse.      Fall prevention education was provided and the patient/caregiver indicated understanding. and Patient/family have participated as able in goal setting and plan of care.     Thank you for this referral.  Kristy Rico, PT   Time Calculation: 33 mins

## 2021-05-18 NOTE — PROGRESS NOTES
The patient was provided a virtul link to view the pre-operative Spine Class. A pre-operative Patient education booklet specific to spine surgery was given to the patient in PAT. The content of the class was presented using an audio power point presentation specific for patients undergoing spine surgery. Incentive spirometer and CHG bath kits were verbally reviewed. Day of surgery routine and expectations, hospital routine and expectations, nutrition, alcohol, nicotine, medications, infection control, pain management, DVT precautions and equipment, ice therapy, durable medical equipment, exercises, mobility expectations and precautions, home preparation and safety were reviewed in class. My contact information was shared with the patient to provide further information as requested by the patient related to their upcoming surgery. Patient states he viewed spine class online.

## 2021-05-19 LAB
ANION GAP SERPL CALC-SCNC: 4 MMOL/L (ref 5–15)
BUN SERPL-MCNC: 14 MG/DL (ref 6–20)
BUN/CREAT SERPL: 13 (ref 12–20)
CALCIUM SERPL-MCNC: 8.4 MG/DL (ref 8.5–10.1)
CHLORIDE SERPL-SCNC: 107 MMOL/L (ref 97–108)
CO2 SERPL-SCNC: 27 MMOL/L (ref 21–32)
CREAT SERPL-MCNC: 1.11 MG/DL (ref 0.7–1.3)
GLUCOSE BLD STRIP.AUTO-MCNC: 142 MG/DL (ref 65–117)
GLUCOSE BLD STRIP.AUTO-MCNC: 149 MG/DL (ref 65–117)
GLUCOSE SERPL-MCNC: 122 MG/DL (ref 65–100)
HGB BLD-MCNC: 8.9 G/DL (ref 12.1–17)
POTASSIUM SERPL-SCNC: 4.5 MMOL/L (ref 3.5–5.1)
SERVICE CMNT-IMP: ABNORMAL
SERVICE CMNT-IMP: ABNORMAL
SODIUM SERPL-SCNC: 138 MMOL/L (ref 136–145)

## 2021-05-19 PROCEDURE — 74011250637 HC RX REV CODE- 250/637: Performed by: ORTHOPAEDIC SURGERY

## 2021-05-19 PROCEDURE — 65270000029 HC RM PRIVATE

## 2021-05-19 PROCEDURE — 80048 BASIC METABOLIC PNL TOTAL CA: CPT

## 2021-05-19 PROCEDURE — 77010033678 HC OXYGEN DAILY

## 2021-05-19 PROCEDURE — 85018 HEMOGLOBIN: CPT

## 2021-05-19 PROCEDURE — 74011250637 HC RX REV CODE- 250/637: Performed by: PHYSICIAN ASSISTANT

## 2021-05-19 PROCEDURE — 94760 N-INVAS EAR/PLS OXIMETRY 1: CPT

## 2021-05-19 PROCEDURE — 36415 COLL VENOUS BLD VENIPUNCTURE: CPT

## 2021-05-19 PROCEDURE — 82962 GLUCOSE BLOOD TEST: CPT

## 2021-05-19 PROCEDURE — 97116 GAIT TRAINING THERAPY: CPT

## 2021-05-19 RX ORDER — MAGNESIUM CITRATE
296 SOLUTION, ORAL ORAL
Status: COMPLETED | OUTPATIENT
Start: 2021-05-19 | End: 2021-05-19

## 2021-05-19 RX ORDER — FACIAL-BODY WIPES
10 EACH TOPICAL
Status: COMPLETED | OUTPATIENT
Start: 2021-05-19 | End: 2021-05-19

## 2021-05-19 RX ADMIN — BUTALBITAL, ACETAMINOPHEN, AND CAFFEINE 2 TABLET: 50; 325; 40 TABLET ORAL at 20:59

## 2021-05-19 RX ADMIN — Medication 10000 UNITS: at 09:26

## 2021-05-19 RX ADMIN — FINASTERIDE 5 MG: 5 TABLET, FILM COATED ORAL at 09:27

## 2021-05-19 RX ADMIN — SPIRONOLACTONE 25 MG: 25 TABLET ORAL at 09:27

## 2021-05-19 RX ADMIN — Medication 10 ML: at 21:00

## 2021-05-19 RX ADMIN — DOCUSATE SODIUM 50MG AND SENNOSIDES 8.6MG 1 TABLET: 8.6; 5 TABLET, FILM COATED ORAL at 09:27

## 2021-05-19 RX ADMIN — POLYETHYLENE GLYCOL 3350 17 G: 17 POWDER, FOR SOLUTION ORAL at 09:27

## 2021-05-19 RX ADMIN — DOCUSATE SODIUM 50MG AND SENNOSIDES 8.6MG 1 TABLET: 8.6; 5 TABLET, FILM COATED ORAL at 18:01

## 2021-05-19 RX ADMIN — BISACODYL 10 MG: 10 SUPPOSITORY RECTAL at 09:27

## 2021-05-19 RX ADMIN — DOXAZOSIN 8 MG: 2 TABLET ORAL at 09:26

## 2021-05-19 RX ADMIN — FAMOTIDINE 20 MG: 20 TABLET, FILM COATED ORAL at 18:01

## 2021-05-19 RX ADMIN — OXYCODONE 5 MG: 5 TABLET ORAL at 09:27

## 2021-05-19 RX ADMIN — Medication 10 ML: at 06:00

## 2021-05-19 RX ADMIN — Medication 10 ML: at 15:20

## 2021-05-19 RX ADMIN — AMLODIPINE BESYLATE 10 MG: 5 TABLET ORAL at 09:27

## 2021-05-19 RX ADMIN — MAGNESIUM CITRATE 296 ML: 1.75 LIQUID ORAL at 18:02

## 2021-05-19 RX ADMIN — FAMOTIDINE 20 MG: 20 TABLET, FILM COATED ORAL at 09:27

## 2021-05-19 RX ADMIN — LISINOPRIL 40 MG: 20 TABLET ORAL at 09:26

## 2021-05-19 RX ADMIN — OXYCODONE 5 MG: 5 TABLET ORAL at 03:25

## 2021-05-19 RX ADMIN — BISACODYL 10 MG: 10 SUPPOSITORY RECTAL at 15:19

## 2021-05-19 RX ADMIN — ALLOPURINOL 600 MG: 300 TABLET ORAL at 09:27

## 2021-05-19 NOTE — PROGRESS NOTES
5/19/2021  4:32 PM  RUR:  8%  Risk Level: [x]Low []Moderate []High  Value-based purchasing: [] Yes [x] No  Bundle patient: [] Yes [x] No   Specify:     Transition of care plan:  1. Awaiting medical clearance and DC order. PT treating. 2. Home with [facility] with [facility]. 3. Outpatient follow-up. 4. Pt's family to transport.

## 2021-05-19 NOTE — PROGRESS NOTES
Problem: Falls - Risk of  Goal: *Absence of Falls  Description: Document Elsy Heredia Fall Risk and appropriate interventions in the flowsheet. Outcome: Progressing Towards Goal  Note: Fall Risk Interventions:  Mobility Interventions: Patient to call before getting OOB         Medication Interventions: Patient to call before getting OOB    Elimination Interventions: Call light in reach              Problem: Patient Education: Go to Patient Education Activity  Goal: Patient/Family Education  Outcome: Progressing Towards Goal     Problem: Pressure Injury - Risk of  Goal: *Prevention of pressure injury  Description: Document Tyrone Scale and appropriate interventions in the flowsheet. Outcome: Progressing Towards Goal  Note: Pressure Injury Interventions:             Activity Interventions: PT/OT evaluation    Mobility Interventions: PT/OT evaluation    Nutrition Interventions: Document food/fluid/supplement intake    Friction and Shear Interventions: HOB 30 degrees or less

## 2021-05-19 NOTE — PROGRESS NOTES
Problem: Mobility Impaired (Adult and Pediatric)  Goal: *Acute Goals and Plan of Care (Insert Text)  Description: FUNCTIONAL STATUS PRIOR TO ADMISSION: indep; used straight cane due to LE pain    HOME SUPPORT PRIOR TO ADMISSION: The patient lived alone with no local support. Physical Therapy Goals  Initiated 5/18/2021    1. Patient will move from supine to sit and sit to supine  in bed with modified independence within 4 days. 2. Patient will perform sit to stand with modified independence within 4 days. 3. Patient will ambulate with modified independence for 150 feet with the least restrictive device within 4 days. 4. Patient will ascend/descend 3 stairs with 1 handrail(s) with minimal assistance/contact guard assist within 4 days. 5. Patient will verbalize and demonstrate understanding of spinal precautions (No bending, lifting greater than 5 lbs, or twisting; log-roll technique; frequent repositioning as instructed) within 4 days. 5/19/2021 1718 by Reji Merritt, PT, DPT  Note:   PHYSICAL THERAPY TREATMENT  Patient: Kisha Serrato (8 y.o. male)  Date: 5/19/2021  Diagnosis: Lumbar stenosis with neurogenic claudication [M48.062] <principal problem not specified>  Procedure(s) (LRB):  L3-L5 LAMINECTOMY, FUSION, INSTRUMENTATION, TLIF, BONE GRAFT (N/A) 2 Days Post-Op  Precautions:   No bending, no lifting greater than 5 lbs, no twisting, log-roll technique, repositioning every 20-30 min except when sleeping, brace when OOB (if ordered)  Chart, physical therapy assessment, plan of care and goals were reviewed. ASSESSMENT  Patient continues with skilled PT services and is progressing towards goals. Ambulated again this pm 210' with RW and steady gait. Narrow WAGNER. Denies new pain, weakness, or sensation changes to BLEs since am. Denies nausea dizziness or headache once upright or during amb. Up to chair for dinner. Progressing well.  .     Current Level of Function Impacting Discharge (mobility/balance): CG A-SBA for amb using RW, LSO brace and gait belt     Other factors to consider for discharge:          PLAN :  Patient continues to benefit from skilled intervention to address the above impairments. Continue treatment per established plan of care. to address goals. Recommendation for discharge: (in order for the patient to meet his/her long term goals)  Physical therapy at least 2 days/week in the home     This discharge recommendation:  Has not yet been discussed the attending provider and/or case management    IF patient discharges home will need the following DME: rolling walker       SUBJECTIVE:   Patient stated I dont have too much pain , maybe a 3/10.     OBJECTIVE DATA SUMMARY:   Critical Behavior:  Neurologic State: Alert  Orientation Level: Oriented X4  Cognition: Follows commands       Spinal diagnosis intervention:  The patient stated 3/3 back precautions when prompted. Reviewed all 3 back precautions, log roll technique, and sitting for 30 minutes at a time. The patient required verbal cues to maintain back precautions during functional activity. Reviewed back brace application and wear schedule. Brace donned with modified independence      Functional Mobility Training:    Bed Mobility:  Log Rolling: Modified independent  Supine to Sit: Modified independent     Scooting: Modified independent        Transfers:  Sit to Stand: Stand-by assistance  Stand to Sit: Stand-by assistance  Stand Pivot Transfers: Stand-by assistance                          Balance:  Sitting: Intact; Without support  Standing: Intact; With support  Ambulation/Gait Training:  Distance (ft): 210 Feet (ft)  Assistive Device: Brace/Splint;Gait belt;Walker, rolling  Ambulation - Level of Assistance: Contact guard assistance        Gait Abnormalities: Antalgic; Ataxic;Decreased step clearance (step through gait, very narrowed WAGNER)        Base of Support: Narrowed  Stance: Left decreased  Speed/Zandra: Accelerated  Step Length: Left shortened;Right shortened    Stairs: from the am  Number of Stairs Trained: 8  Stairs - Level of Assistance: Minimum assistance; Moderate assistance; Additional time;Assist X1 (min A ascend, Mod HHA to descend stairs)   Rail Use: None (SPC and HHA; dgt practiced guarding 4 steps)    Pain Rating:  3/10 low back, change in position and advised ice once back to bed    Activity Tolerance:   Good and SpO2 stable on RA    After treatment patient left in no apparent distress:   Sitting in chair, Call bell within reach, and Caregiver / family present    COMMUNICATION/COLLABORATION:   The patients plan of care was discussed with: Registered nurse. Marisol Santiago, PT, DPT   Time Calculation: 15 mins         5/19/2021 1255 by Suki Betancur, PT, DPT  Note:   PHYSICAL THERAPY TREATMENT  Patient: Von Luciano (81 y.o. male)  Date: 5/19/2021  Diagnosis: Lumbar stenosis with neurogenic claudication [M48.062] <principal problem not specified>  Procedure(s) (LRB):  L3-L5 LAMINECTOMY, FUSION, INSTRUMENTATION, TLIF, BONE GRAFT (N/A) 2 Days Post-Op  Precautions:   No bending, no lifting greater than 5 lbs, no twisting, log-roll technique, repositioning every 20-30 min except when sleeping, brace when OOB (if ordered)  Chart, physical therapy assessment, plan of care and goals were reviewed. ASSESSMENT  Patient continues with skilled PT services and is progressing towards goals. Patient tolerated gait 210' with LSO brace, gait belt and RW. Patient denied symptoms of HA nausea or dizziness with upright activity. Patient with mildly ataxic gait and narrowed WAGNER but without loss of balance or knee buckling. Step through pattern demonstrated. He insisted on ambulating entire tx rather than using wheelchair to return to room. 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 Recommend continued practice with amb and stairs. Dgt plans to stay with him upon d/c to home since he lives alone. Dgt and patient given gait belt. Current Level of Function Impacting Discharge (mobility/balance): min/mod A for stairs without use of handrail, CG A for amb    Other factors to consider for discharge: lives alone; flight of basement stairs         PLAN :  Patient continues to benefit from skilled intervention to address the above impairments. Continue treatment per established plan of care. to address goals. Recommendation for discharge: (in order for the patient to meet his/her long term goals)  Physical therapy at least 2 days/week in the home     This discharge recommendation:  Has not yet been discussed the attending provider and/or case management    IF patient discharges home will need the following DME: rolling walker       SUBJECTIVE:   Patient stated I can walk. It actually feels better to walk more. Eliza Jeffries    OBJECTIVE DATA SUMMARY:   Critical Behavior:  Neurologic State: Alert  Orientation Level: Oriented X4  Cognition: Follows commands       Spinal diagnosis intervention:  The patient stated 3/3 back precautions when prompted. Reviewed all 3 back precautions, log roll technique, and sitting for 30 minutes at a time. The patient required min cues to maintain back precautions during functional activity. Reviewed back brace application and wear schedule. Brace donned with minimal assistance/contact guard assist      Functional Mobility Training:    Bed Mobility:  Log Rolling:  (patient received sitting on bathroom commode with call light)    Transfers:  Sit to Stand: Contact guard assistance  Stand to Sit: Contact guard assistance  Stand Pivot Transfers: Contact guard assistance          Balance:  Sitting: Intact; Without support  Standing: Intact; With support  Ambulation/Gait Training:  Distance (ft): 210 Feet (ft)  Assistive Device: Brace/Splint;Gait belt;Walker, rolling  Ambulation - Level of Assistance: Contact guard assistance Gait Abnormalities: Antalgic; Ataxic;Decreased step clearance (step through gait, very narrowed WAGNER)        Base of Support: Narrowed  Stance: Left decreased  Speed/Zandra: Accelerated  Step Length: Left shortened;Right shortened    Stairs:  Number of Stairs Trained: 8  Stairs - Level of Assistance: Minimum assistance; Moderate assistance; Additional time;Assist X1 (min A ascend, Mod HHA to descend stairs)   Rail Use: None (SPC and HHA; dgt practiced guarding 4 steps)    Pain Ratin/10    Activity Tolerance:   Fair, SpO2 stable on RA, and observed SOB with activity    After treatment patient left in no apparent distress:   Sitting in chair, Call bell within reach, and Caregiver / family present    COMMUNICATION/COLLABORATION:   The patients plan of care was discussed with: Registered nurse.      Blaire Villeda, PT, DPT   Time Calculation: 35 mins

## 2021-05-19 NOTE — PROGRESS NOTES
ORTHOPAEDIC LUMBAR FUSION PROGRESS NOTE    NAME:     Messi Chapin   :       1950   MRN:       [id number]   DATE:      2021    POD:    2 Days Post-Op  S/P:    Procedure(s):  L3-L5 LAMINECTOMY, FUSION, INSTRUMENTATION, TLIF, BONE GRAFT    SUBJECTIVE:    C/O back pain along surgical incision  No leg pain or numbness  Denies nausea/vomiting, headache, chest pain or shortness of breath  Pain controlled    Recent Labs     21  0212   HGB 8.9*      K 4.5      CO2 27   BUN 14   CREA 1.11   *     Patient Vitals for the past 12 hrs:   BP Temp Pulse Resp SpO2   21 0352 (!) 162/82 98.5 °F (36.9 °C) 73 17 98 %   21 (!) 146/77 98.8 °F (37.1 °C) 76 16 97 %   21 (!) 149/78 97.6 °F (36.4 °C)  16 98 %       EXAM:  Dressings clean, dry and intact   Positive strength/ROM bilat lower ext.   Neuro intact to sensation  Calves, soft & nontender  BL LEs NVID      PLAN:  Continue prn PO pain medications  PT/OT, OOB w/ assist  Tolerating diet  Dulcolax suppository- last BM was Monday, pt advised the importance of no straining  Monitor hemovac drain output      Blair Tapia [place]  Orthopaedic Surgery  Physician Assistant to Dr. Damari Can

## 2021-05-19 NOTE — PROGRESS NOTES
Problem: Mobility Impaired (Adult and Pediatric)  Goal: *Acute Goals and Plan of Care (Insert Text)  Description: FUNCTIONAL STATUS PRIOR TO ADMISSION: indep; used straight cane due to LE pain    HOME SUPPORT PRIOR TO ADMISSION: The patient lived alone with no local support. Physical Therapy Goals  Initiated 5/18/2021    1. Patient will move from supine to sit and sit to supine  in bed with modified independence within 4 days. 2. Patient will perform sit to stand with modified independence within 4 days. 3. Patient will ambulate with modified independence for 150 feet with the least restrictive device within 4 days. 4. Patient will ascend/descend 3 stairs with 1 handrail(s) with minimal assistance/contact guard assist within 4 days. 5. Patient will verbalize and demonstrate understanding of spinal precautions (No bending, lifting greater than 5 lbs, or twisting; log-roll technique; frequent repositioning as instructed) within 4 days. Note:   PHYSICAL THERAPY TREATMENT  Patient: Jonathan Rivers (20 y.o. male)  Date: 5/19/2021  Diagnosis: Lumbar stenosis with neurogenic claudication [M48.062] <principal problem not specified>  Procedure(s) (LRB):  L3-L5 LAMINECTOMY, FUSION, INSTRUMENTATION, TLIF, BONE GRAFT (N/A) 2 Days Post-Op  Precautions:   No bending, no lifting greater than 5 lbs, no twisting, log-roll technique, repositioning every 20-30 min except when sleeping, brace when OOB (if ordered)  Chart, physical therapy assessment, plan of care and goals were reviewed. ASSESSMENT  Patient continues with skilled PT services and is progressing towards goals. Patient tolerated gait 210' with LSO brace, gait belt and RW. Patient denied symptoms of HA nausea or dizziness with upright activity. Patient with mildly ataxic gait and narrowed WAGNER but without loss of balance or knee buckling. Step through pattern demonstrated. He insisted on ambulating entire tx rather than using wheelchair to return to room.  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 Recommend continued practice with amb and stairs. Dgt plans to stay with him upon d/c to home since he lives alone. Dgt and patient given gait belt. Current Level of Function Impacting Discharge (mobility/balance): min/mod A for stairs without use of handrail, CG A for amb    Other factors to consider for discharge: lives alone; flight of basement stairs         PLAN :  Patient continues to benefit from skilled intervention to address the above impairments. Continue treatment per established plan of care. to address goals. Recommendation for discharge: (in order for the patient to meet his/her long term goals)  Physical therapy at least 2 days/week in the home     This discharge recommendation:  Has not yet been discussed the attending provider and/or case management    IF patient discharges home will need the following DME: rolling walker       SUBJECTIVE:   Patient stated I can walk. It actually feels better to walk more. [employer]    OBJECTIVE DATA SUMMARY:   Critical Behavior:  Neurologic State: Alert  Orientation Level: Oriented X4  Cognition: Follows commands       Spinal diagnosis intervention:  The patient stated 3/3 back precautions when prompted. Reviewed all 3 back precautions, log roll technique, and sitting for 30 minutes at a time. The patient required min cues to maintain back precautions during functional activity. Reviewed back brace application and wear schedule. Brace donned with minimal assistance/contact guard assist      Functional Mobility Training:    Bed Mobility:  Log Rolling:  (patient received sitting on bathroom commode with call light)    Transfers:  Sit to Stand: Contact guard assistance  Stand to Sit: Contact guard assistance  Stand Pivot Transfers: Contact guard assistance          Balance:  Sitting: Intact; Without support  Standing: Intact; With support  Ambulation/Gait Training:  Distance (ft): 210 Feet (ft)  Assistive Device: Brace/Splint;Gait belt;Walker, rolling  Ambulation - Level of Assistance: Contact guard assistance        Gait Abnormalities: Antalgic; Ataxic;Decreased step clearance (step through gait, very narrowed WAGNER)        Base of Support: Narrowed  Stance: Left decreased  Speed/Zandra: Accelerated  Step Length: Left shortened;Right shortened    Stairs:  Number of Stairs Trained: 8  Stairs - Level of Assistance: Minimum assistance; Moderate assistance; Additional time;Assist X1 (min A ascend, Mod HHA to descend stairs)   Rail Use: None (SPC and HHA; dgt practiced guarding 4 steps)    Pain Ratin/10    Activity Tolerance:   Fair, SpO2 stable on RA, and observed SOB with activity    After treatment patient left in no apparent distress:   Sitting in chair, Call bell within reach, and Caregiver / family present    COMMUNICATION/COLLABORATION:   The patients plan of care was discussed with: Registered nurse.      Maritza Todd PT, DPT   Time Calculation: 35 mins

## 2021-05-20 ENCOUNTER — APPOINTMENT (OUTPATIENT)
Dept: GENERAL RADIOLOGY | Age: 71
DRG: 454 | End: 2021-05-20
Attending: PHYSICIAN ASSISTANT
Payer: MEDICARE

## 2021-05-20 LAB
ANION GAP SERPL CALC-SCNC: 5 MMOL/L (ref 5–15)
BASOPHILS # BLD: 0 K/UL (ref 0–0.1)
BASOPHILS NFR BLD: 0 % (ref 0–1)
BUN SERPL-MCNC: 15 MG/DL (ref 6–20)
BUN/CREAT SERPL: 15 (ref 12–20)
CALCIUM SERPL-MCNC: 9 MG/DL (ref 8.5–10.1)
CHLORIDE SERPL-SCNC: 104 MMOL/L (ref 97–108)
CO2 SERPL-SCNC: 29 MMOL/L (ref 21–32)
CREAT SERPL-MCNC: 1 MG/DL (ref 0.7–1.3)
DIFFERENTIAL METHOD BLD: ABNORMAL
EOSINOPHIL # BLD: 0.1 K/UL (ref 0–0.4)
EOSINOPHIL NFR BLD: 1 % (ref 0–7)
ERYTHROCYTE [DISTWIDTH] IN BLOOD BY AUTOMATED COUNT: 13.5 % (ref 11.5–14.5)
GLUCOSE BLD STRIP.AUTO-MCNC: 127 MG/DL (ref 65–117)
GLUCOSE BLD STRIP.AUTO-MCNC: 139 MG/DL (ref 65–117)
GLUCOSE BLD STRIP.AUTO-MCNC: 147 MG/DL (ref 65–117)
GLUCOSE BLD STRIP.AUTO-MCNC: 152 MG/DL (ref 65–117)
GLUCOSE SERPL-MCNC: 143 MG/DL (ref 65–100)
HCT VFR BLD AUTO: 27.2 % (ref 36.6–50.3)
HGB BLD-MCNC: 8.8 G/DL (ref 12.1–17)
HGB BLD-MCNC: 8.9 G/DL (ref 12.1–17)
IMM GRANULOCYTES # BLD AUTO: 0 K/UL (ref 0–0.04)
IMM GRANULOCYTES NFR BLD AUTO: 0 % (ref 0–0.5)
LYMPHOCYTES # BLD: 1.1 K/UL (ref 0.8–3.5)
LYMPHOCYTES NFR BLD: 11 % (ref 12–49)
MCH RBC QN AUTO: 31.6 PG (ref 26–34)
MCHC RBC AUTO-ENTMCNC: 32.7 G/DL (ref 30–36.5)
MCV RBC AUTO: 96.5 FL (ref 80–99)
MONOCYTES # BLD: 1.1 K/UL (ref 0–1)
MONOCYTES NFR BLD: 11 % (ref 5–13)
NEUTS SEG # BLD: 7.4 K/UL (ref 1.8–8)
NEUTS SEG NFR BLD: 77 % (ref 32–75)
NRBC # BLD: 0 K/UL (ref 0–0.01)
NRBC BLD-RTO: 0 PER 100 WBC
PLATELET # BLD AUTO: 272 K/UL (ref 150–400)
PMV BLD AUTO: 10.7 FL (ref 8.9–12.9)
POTASSIUM SERPL-SCNC: 3.5 MMOL/L (ref 3.5–5.1)
RBC # BLD AUTO: 2.82 M/UL (ref 4.1–5.7)
SERVICE CMNT-IMP: ABNORMAL
SODIUM SERPL-SCNC: 138 MMOL/L (ref 136–145)
WBC # BLD AUTO: 9.7 K/UL (ref 4.1–11.1)

## 2021-05-20 PROCEDURE — 74018 RADEX ABDOMEN 1 VIEW: CPT

## 2021-05-20 PROCEDURE — 65270000029 HC RM PRIVATE

## 2021-05-20 PROCEDURE — 97530 THERAPEUTIC ACTIVITIES: CPT

## 2021-05-20 PROCEDURE — 85025 COMPLETE CBC W/AUTO DIFF WBC: CPT

## 2021-05-20 PROCEDURE — 80048 BASIC METABOLIC PNL TOTAL CA: CPT

## 2021-05-20 PROCEDURE — 74011636637 HC RX REV CODE- 636/637: Performed by: PHYSICIAN ASSISTANT

## 2021-05-20 PROCEDURE — 94760 N-INVAS EAR/PLS OXIMETRY 1: CPT

## 2021-05-20 PROCEDURE — 74011250637 HC RX REV CODE- 250/637: Performed by: ORTHOPAEDIC SURGERY

## 2021-05-20 PROCEDURE — 85018 HEMOGLOBIN: CPT

## 2021-05-20 PROCEDURE — 77030041075 HC DRSG AG OPTIFRM MDII -B

## 2021-05-20 PROCEDURE — 97116 GAIT TRAINING THERAPY: CPT

## 2021-05-20 PROCEDURE — 74011250636 HC RX REV CODE- 250/636: Performed by: NURSE PRACTITIONER

## 2021-05-20 PROCEDURE — 82962 GLUCOSE BLOOD TEST: CPT

## 2021-05-20 PROCEDURE — 36415 COLL VENOUS BLD VENIPUNCTURE: CPT

## 2021-05-20 RX ORDER — SODIUM CHLORIDE 9 MG/ML
50 INJECTION, SOLUTION INTRAVENOUS CONTINUOUS
Status: DISCONTINUED | OUTPATIENT
Start: 2021-05-20 | End: 2021-05-26 | Stop reason: HOSPADM

## 2021-05-20 RX ADMIN — Medication 10 ML: at 16:11

## 2021-05-20 RX ADMIN — POLYETHYLENE GLYCOL 3350 17 G: 17 POWDER, FOR SOLUTION ORAL at 10:42

## 2021-05-20 RX ADMIN — Medication 10 ML: at 22:00

## 2021-05-20 RX ADMIN — Medication 10 ML: at 06:00

## 2021-05-20 RX ADMIN — AMLODIPINE BESYLATE 10 MG: 5 TABLET ORAL at 10:40

## 2021-05-20 RX ADMIN — FINASTERIDE 5 MG: 5 TABLET, FILM COATED ORAL at 10:42

## 2021-05-20 RX ADMIN — METHYLNALTREXONE BROMIDE 12 MG: 12 INJECTION, SOLUTION SUBCUTANEOUS at 10:37

## 2021-05-20 RX ADMIN — Medication 10000 UNITS: at 10:42

## 2021-05-20 RX ADMIN — LISINOPRIL 40 MG: 20 TABLET ORAL at 10:40

## 2021-05-20 RX ADMIN — FAMOTIDINE 20 MG: 20 TABLET, FILM COATED ORAL at 10:42

## 2021-05-20 RX ADMIN — SPIRONOLACTONE 25 MG: 25 TABLET ORAL at 10:40

## 2021-05-20 RX ADMIN — FAMOTIDINE 20 MG: 20 TABLET, FILM COATED ORAL at 18:17

## 2021-05-20 RX ADMIN — ALLOPURINOL 600 MG: 300 TABLET ORAL at 10:42

## 2021-05-20 RX ADMIN — DOCUSATE SODIUM 50MG AND SENNOSIDES 8.6MG 1 TABLET: 8.6; 5 TABLET, FILM COATED ORAL at 18:17

## 2021-05-20 RX ADMIN — SODIUM CHLORIDE 75 ML/HR: 9 INJECTION, SOLUTION INTRAVENOUS at 16:12

## 2021-05-20 RX ADMIN — DOXAZOSIN 8 MG: 2 TABLET ORAL at 10:40

## 2021-05-20 RX ADMIN — DOCUSATE SODIUM 50MG AND SENNOSIDES 8.6MG 1 TABLET: 8.6; 5 TABLET, FILM COATED ORAL at 10:42

## 2021-05-20 NOTE — PROGRESS NOTES
Dr. Linda Lowry rounded on pt this afternoon with NP  Pt's abdomen was feeling improved this afternoon  Has had multiple BMs  No change in care plan    Landen Matthews [place]  Orthopaedic Spine Surgery  Physician Assistant to Dr. Lino Morales

## 2021-05-20 NOTE — PROGRESS NOTES
Attempted to work with the patient for Physical Therapy, chart reviewed and discussed with nurse cleared for therapy. Patient supine on bed when received politely asked to defer for now he just got back to bed. patient also reported that he ambulate with his daughter on the hallway just before going back to bed. Educate and instructed patient to continue active range of motion exercise on both legs while up on chair or on bed multiple times. Recommend patient to be up on the chair at least 3 times a day every meal times as tolerated. We will continue to follow up with the patient for therapy thank you.

## 2021-05-20 NOTE — PROGRESS NOTES
ORTHOPAEDIC LUMBAR FUSION PROGRESS NOTE    NAME:     Ann Anna   :       1950   MRN:       [id number]   DATE:      2021    POD:    3 Days Post-Op  S/P:    Procedure(s):  L3-L5 LAMINECTOMY, FUSION, INSTRUMENTATION, TLIF, BONE GRAFT    SUBJECTIVE:    C/O abdominal discomfort/fullness/distension  Passing a small amnt of flatus  No leg pain or numbness  Denies nausea/vomiting, headache, chest pain or shortness of breath      Recent Labs     21  0258   HGB 8.8*      K 3.5      CO2 29   BUN 15   CREA 1.00   *     Patient Vitals for the past 12 hrs:   BP Temp Pulse Resp SpO2   21 0421 (!) 153/85 98.7 °F (37.1 °C) 78 16 91 %   21 0017 133/77 98 °F (36.7 °C) 81 16 92 %   21 (!) 150/77 98.9 °F (37.2 °C) 76 16 95 %       EXAM:  Dressings clean, dry and intact   Positive strength/ROM bilat lower ext. Neuro intact to sensation  Calves, soft & nontender  BL LEs NVID    Abdomen: Abdomen distended. Hypoactive BS in all quadrants. No tenderness. No rigidity, rebound or guarding.        PLAN:  D/C regular diet, change to sips of clears  No NG tube at this time, if pt starts vomiting may need NG tube placement  STAT KUB ordered  Continue prn PO pain medications- try to limit due to suspected ileus  Will start relistor  Soap suds enema ordered  Mobilize patient      Eugene Bertrand [place]  Orthopaedic Surgery  Physician Assistant to Dr. Bill Rios

## 2021-05-20 NOTE — PROGRESS NOTES
Problem: Mobility Impaired (Adult and Pediatric)  Goal: *Acute Goals and Plan of Care (Insert Text)  Description: FUNCTIONAL STATUS PRIOR TO ADMISSION: indep; used straight cane due to LE pain    HOME SUPPORT PRIOR TO ADMISSION: The patient lived alone with no local support. Physical Therapy Goals  Initiated 5/18/2021    1. Patient will move from supine to sit and sit to supine  in bed with modified independence within 4 days. 2. Patient will perform sit to stand with modified independence within 4 days. 3. Patient will ambulate with modified independence for 150 feet with the least restrictive device within 4 days. 4. Patient will ascend/descend 3 stairs with 1 handrail(s) with minimal assistance/contact guard assist within 4 days. 5. Patient will verbalize and demonstrate understanding of spinal precautions (No bending, lifting greater than 5 lbs, or twisting; log-roll technique; frequent repositioning as instructed) within 4 days. Outcome: Progressing Towards Goal   PHYSICAL THERAPY TREATMENT  Patient: Alexis Monroe (5 y.o. male)  Date: 5/20/2021  Diagnosis: Lumbar stenosis with neurogenic claudication [M48.062] <principal problem not specified>  Procedure(s) (LRB):  L3-L5 LAMINECTOMY, FUSION, INSTRUMENTATION, TLIF, BONE GRAFT (N/A) 3 Days Post-Op  Precautions:  Log roll, Back brace when OOB, may be up to bathroom without the back brace. Chart, physical therapy assessment, plan of care and goals were reviewed. ASSESSMENT  Patient continues with skilled PT services and is progressing towards goals. Communicated with nurse cleared with therapy. Patient supine on bed complaining of belly feeling so full, MD aware and monitoring patient. Ambulate with supervision out on the 4th floor hallway using a rolling walker. Steady sitting and standing balance already did the stairs yesterday did well. Reviewed back precautions and donning of back brace verbalized understanding. OOB to chair as tolerated. Performed some active range of motion exercise on both LE all planes. Educate and instructed patient to continue active range of motion exercise on both legs while up on chair or on bed multiple times. Recommend patient to be up on the chair at least 3 times  day every meal times as tolerated. Activated chair alarm and notified nurse who agreed to monitor patient     Current Level of Function Impacting Discharge (mobility/balance): supervision with rolling walker for ambulation and transfers. Other factors to consider for discharge: pain         PLAN :  Patient continues to benefit from skilled intervention to address the above impairments. Continue treatment per established plan of care. to address goals. Recommendation for discharge: (in order for the patient to meet his/her long term goals)      This discharge recommendation:  Has been made in collaboration with the attending provider and/or case management    IF patient discharges home will need the following DME: patient owns DME required for discharge       SUBJECTIVE:   Patient stated [address].     OBJECTIVE DATA SUMMARY:   Critical Behavior:  Neurologic State: Alert  Orientation Level: Oriented X4  Cognition: Follows commands     Functional Mobility Training:  Bed Mobility:  Rolling: Modified independent  Supine to Sit: Modified independent  Sit to Supine: Modified independent  Scooting: Modified independent        Transfers:  Sit to Stand: Supervision  Stand to Sit: Supervision  Stand Pivot Transfers: Supervision     Bed to Chair: Supervision                    Balance:  Sitting: Intact  Standing: Intact; With support  Ambulation/Gait Training:  Distance (ft): 300 Feet (ft)  Assistive Device: Walker, rolling;Gait belt;Brace/Splint  Ambulation - Level of Assistance: Supervision     Gait Description (WDL): Exceptions to WDL  Gait Abnormalities: Antalgic; Path deviations        Base of Support: Widened     Speed/Zandra: Pace decreased (<100 feet/min) Therapeutic Exercises:   Educate and instructed patient to continue active range of motion exercise on both legs while up on chair or on bed multiple times. Recommend patient to be up on the chair at least 3 times  day every meal times as tolerated. Pain Ratin/10    Activity Tolerance:   Good    After treatment patient left in no apparent distress:   Sitting in chair, Heels elevated for pressure relief, Call bell within reach, Bed / chair alarm activated, and Caregiver / family present    COMMUNICATION/COLLABORATION:   The patients plan of care was discussed with: Registered nurse and Case management. Jayesh Rubio PT,WCC.    Time Calculation: 24 mins

## 2021-05-20 NOTE — PROGRESS NOTES
5/20/2021  Case Management Progress Note    10:38 AM  Patient is a 69year old male admitted 5/17 with lumbar stenosis. Patient's RUR is 7% green/low risk of readmission. Chart reviewed. Patient is already set up with [facility] for discharge. He is still in pain and distended, diet moving to sips of clears rather than regular due to potential ileus. No other CM needs at this time, will continue to follow and assist with discharge planning. Transition of Care Plan  1. Continue medical management/treatment for potential ileus   2. Home with family assistance & [facility] when ready   3. Family will transport  4.  CM will continue to follow    COURTNEY Domingo

## 2021-05-21 ENCOUNTER — APPOINTMENT (OUTPATIENT)
Dept: GENERAL RADIOLOGY | Age: 71
DRG: 454 | End: 2021-05-21
Attending: NURSE PRACTITIONER
Payer: MEDICARE

## 2021-05-21 LAB
ANION GAP SERPL CALC-SCNC: 5 MMOL/L (ref 5–15)
BUN SERPL-MCNC: 15 MG/DL (ref 6–20)
BUN/CREAT SERPL: 14 (ref 12–20)
CALCIUM SERPL-MCNC: 8.3 MG/DL (ref 8.5–10.1)
CHLORIDE SERPL-SCNC: 105 MMOL/L (ref 97–108)
CO2 SERPL-SCNC: 27 MMOL/L (ref 21–32)
CREAT SERPL-MCNC: 1.06 MG/DL (ref 0.7–1.3)
GLUCOSE BLD STRIP.AUTO-MCNC: 130 MG/DL (ref 65–117)
GLUCOSE BLD STRIP.AUTO-MCNC: 141 MG/DL (ref 65–117)
GLUCOSE SERPL-MCNC: 111 MG/DL (ref 65–100)
HGB BLD-MCNC: 8.2 G/DL (ref 12.1–17)
POTASSIUM SERPL-SCNC: 3.6 MMOL/L (ref 3.5–5.1)
SERVICE CMNT-IMP: ABNORMAL
SERVICE CMNT-IMP: ABNORMAL
SODIUM SERPL-SCNC: 137 MMOL/L (ref 136–145)

## 2021-05-21 PROCEDURE — 74011636637 HC RX REV CODE- 636/637: Performed by: PHYSICIAN ASSISTANT

## 2021-05-21 PROCEDURE — 82962 GLUCOSE BLOOD TEST: CPT

## 2021-05-21 PROCEDURE — 85018 HEMOGLOBIN: CPT

## 2021-05-21 PROCEDURE — 74018 RADEX ABDOMEN 1 VIEW: CPT

## 2021-05-21 PROCEDURE — 74011250637 HC RX REV CODE- 250/637: Performed by: ORTHOPAEDIC SURGERY

## 2021-05-21 PROCEDURE — 97116 GAIT TRAINING THERAPY: CPT

## 2021-05-21 PROCEDURE — 36415 COLL VENOUS BLD VENIPUNCTURE: CPT

## 2021-05-21 PROCEDURE — 65270000029 HC RM PRIVATE

## 2021-05-21 PROCEDURE — 77030041075 HC DRSG AG OPTIFRM MDII -B

## 2021-05-21 PROCEDURE — 74011250636 HC RX REV CODE- 250/636: Performed by: NURSE PRACTITIONER

## 2021-05-21 PROCEDURE — 97530 THERAPEUTIC ACTIVITIES: CPT

## 2021-05-21 PROCEDURE — 80048 BASIC METABOLIC PNL TOTAL CA: CPT

## 2021-05-21 PROCEDURE — 71046 X-RAY EXAM CHEST 2 VIEWS: CPT

## 2021-05-21 PROCEDURE — 94760 N-INVAS EAR/PLS OXIMETRY 1: CPT

## 2021-05-21 RX ADMIN — FAMOTIDINE 20 MG: 20 TABLET, FILM COATED ORAL at 18:12

## 2021-05-21 RX ADMIN — FAMOTIDINE 20 MG: 20 TABLET, FILM COATED ORAL at 09:07

## 2021-05-21 RX ADMIN — AMLODIPINE BESYLATE 10 MG: 5 TABLET ORAL at 09:06

## 2021-05-21 RX ADMIN — LISINOPRIL 40 MG: 20 TABLET ORAL at 09:06

## 2021-05-21 RX ADMIN — Medication 10000 UNITS: at 09:06

## 2021-05-21 RX ADMIN — Medication 10 ML: at 16:34

## 2021-05-21 RX ADMIN — ACETAMINOPHEN 650 MG: 325 TABLET ORAL at 18:22

## 2021-05-21 RX ADMIN — Medication 10 ML: at 22:22

## 2021-05-21 RX ADMIN — SPIRONOLACTONE 25 MG: 25 TABLET ORAL at 09:06

## 2021-05-21 RX ADMIN — BENZOCAINE AND MENTHOL 1 LOZENGE: 15; 3.6 LOZENGE ORAL at 16:34

## 2021-05-21 RX ADMIN — FINASTERIDE 5 MG: 5 TABLET, FILM COATED ORAL at 09:07

## 2021-05-21 RX ADMIN — ALLOPURINOL 600 MG: 300 TABLET ORAL at 09:06

## 2021-05-21 RX ADMIN — SODIUM CHLORIDE 75 ML/HR: 9 INJECTION, SOLUTION INTRAVENOUS at 18:18

## 2021-05-21 RX ADMIN — SODIUM CHLORIDE 75 ML/HR: 9 INJECTION, SOLUTION INTRAVENOUS at 04:57

## 2021-05-21 RX ADMIN — DOXAZOSIN 8 MG: 2 TABLET ORAL at 10:01

## 2021-05-21 RX ADMIN — METHYLNALTREXONE BROMIDE 12 MG: 12 INJECTION, SOLUTION SUBCUTANEOUS at 18:11

## 2021-05-21 NOTE — PROGRESS NOTES
Patient refusing miralax and senna due to frequent loose stools. Rebekah Arshad NP notified. Abdominal xray reported also. Ok to hold meds at this time. 1430- Patient reports more distention and tightness in abdomen after drinking clear liquids for lunch. He also had a small formed stool. Bowel sounds are active. Rebekah Arshad NP notified. New orders to d/c liquids diet and resume sips only. Rebekah Arshad NP notified of continued cough. Patient states Cepacol lozenge  is not helping. PRN tylenol given. Slight temp around 1600 today. New orders for chest xray.

## 2021-05-21 NOTE — PROGRESS NOTES
Comprehensive Nutrition Assessment    Type and Reason for Visit: Initial, RD nutrition re-screen/LOS    Nutrition Recommendations/Plan:   1. Advance diet as medically feasible/tolerated. 2. Added apple Ensure Clear BID while on CLD to promote protein intake. Nutrition Assessment:     5/21: 68 y/o female admitted with lumbar stenosis. S/p surgery 5/17. Pt with abd discomfort and distension since yesterday. Multiple BMs since then. Pt says discomfort was better this AM but worsened after trying to consume the clear liquids for lunch. Eating well PTA. Denies any recent weight changes. No c/o N/V. Pt agreeable to trying Ensure Clear for extra protein while on clears. Will monitor tolerance/acceptance. Labs- Hgb 8.2, -[phone]. [email or web address], vit. D3, bowel regimen, aldactone. Documented Meal intake:  Patient Vitals for the past 168 hrs:   % Diet Eaten   05/19/21 0800 76 - 100%   05/18/21 1806 51 - 75%   05/18/21 0922 26 - 50%   05/17/21 2000 51 - 75%   05/17/21 1815 51 - 75%     Malnutrition Assessment:  Malnutrition Status: none identified    Estimated Daily Nutrient Needs:  Energy (kcal): 1982 (1525 x 1.3 AF); Weight Used for Energy Requirements: Current  Protein (g): 81 (1-1.2 g/kg); Weight Used for Protein Requirements: Current  Fluid (ml/day): 1892; Method Used for Fluid Requirements: 1 ml/kcal      Nutrition Related Findings:  last BM  5/21      Wounds:    Surgical incision       Current Nutrition Therapies:  DIET CLEAR LIQUID  DIET NUTRITIONAL SUPPLEMENTS Breakfast, Dinner; Ensure Clear    Anthropometric Measures:  · Height:  5' 7\" (170.2 cm)  · Current Body Wt:  81.2 kg (179 lb)   · Admission Body Wt:       · Usual Body Wt:        · Ideal Body Wt:  148 lbs:  120.9 %   · Adjusted Body Weight:   ; Weight Adjustment for:     · Adjusted BMI:       · BMI Category: Overweight (BMI 25.0-29. 9)       Nutrition Diagnosis:   · Increased nutrient needs related to increased demand for energy/nutrients (protein) as evidenced by  (s/p surgery)      Nutrition Interventions:   Food and/or Nutrient Delivery: Start oral nutrition supplement, Modify current diet  Nutrition Education and Counseling: No recommendations at this time  Coordination of Nutrition Care: Continue to monitor while inpatient    Goals:  Diet advancement with po intake >50% meals/supplements meeting protein needs next 1-3 days       Nutrition Monitoring and Evaluation:   Behavioral-Environmental Outcomes: None identified  Food/Nutrient Intake Outcomes: Diet advancement/tolerance, Food and nutrient intake, Supplement intake  Physical Signs/Symptoms Outcomes: Weight, Biochemical data, GI status    Discharge Planning:    Continue current diet     Electronically signed by Damian Solis RDN on 5/21/2021 at 2:23 PM    Contact: [phone]

## 2021-05-21 NOTE — PROGRESS NOTES
Spiritual Care Assessment/Progress Note  [address]      NAME: Radha Shields      MRN: [id number]  AGE: 69 y.o.  SEX: male  Religion Affiliation: Catholic   Language: English     5/21/2021     Total Time (in minutes): 9     Spiritual Assessment begun in [facility] 4M POST SURG ORT 1 through conversation with:         [x]Patient        [x] Family    [] Friend(s)        Reason for Consult: Initial/Spiritual assessment, patient floor     Spiritual beliefs: (Please include comment if needed)     [x] Identifies with a cass tradition:  Catholic       [] Supported by a cass community:            [] Claims no spiritual orientation:           [] Seeking spiritual identity:                [] Adheres to an individual form of spirituality:           [] Not able to assess:                           Identified resources for coping:      [] Prayer                               [] Music                  [] Guided Imagery     [x] Family/friends                 [] Pet visits     [] Devotional reading                         [] Unknown     [] Other:                                             Interventions offered during this visit: (See comments for more details)    Patient Interventions: Affirmation of emotions/emotional suffering, Affirmation of cass, Catharsis/review of pertinent events in supportive environment, Coping skills reviewed/reinforced, Iconic (affirming the presence of God/Higher Power)     Family/Friend(s): Catharsis/review of pertinent events in supportive environment, Coping skills reviewed/reinforced, Iconic (affirming the presence of God/Higher Power)     Plan of Care:     [] Support spiritual and/or cultural needs    [] Support AMD and/or advance care planning process      [] Support grieving process   [] Coordinate Rites and/or Rituals    [] Coordination with community clergy   [] No spiritual needs identified at this time   [] Detailed Plan of Care below (See Comments)  [] Make referral to Music Therapy  [] Make referral to Pet Therapy     [] Make referral to Addiction services  [] Make referral to [facility]  [] Make referral to Spiritual Care Partner  [] No future visits requested        [x] Follow up upon further referrals     Comments:  visit for initial spiritual assessment. Patient sitting in chair at bedside, good eye contact, smiling, friendly. Patient's daughter also at bedside. Says he is feeling good today. Provided spiritual presence and listening as he spoke of his present thoughts, feelings, and concerns. Spoke of his health and events leading to his hospitalization and surgery. Says he is not having pain and has been up walking. Hopes to be able to discharge to home as soon as able. Did not identify any particular spiritual needs. Expressed gratitude to the staff for all of the care provided. Both appeared comforted and encouraged as a result of this visit and expressed gratitude for this visit. Rev.  Roderick Potts MDiv, [facility], [facility]   paging service: [phone])

## 2021-05-21 NOTE — PROGRESS NOTES
Problem: Mobility Impaired (Adult and Pediatric)  Goal: *Acute Goals and Plan of Care (Insert Text)  Description: FUNCTIONAL STATUS PRIOR TO ADMISSION: indep; used straight cane due to LE pain    HOME SUPPORT PRIOR TO ADMISSION: The patient lived alone with no local support. Physical Therapy Goals  Initiated 5/18/2021    1. Patient will move from supine to sit and sit to supine  in bed with modified independence within 4 days. 2. Patient will perform sit to stand with modified independence within 4 days. 3. Patient will ambulate with modified independence for 150 feet with the least restrictive device within 4 days. 4. Patient will ascend/descend 3 stairs with 1 handrail(s) with minimal assistance/contact guard assist within 4 days. 5. Patient will verbalize and demonstrate understanding of spinal precautions (No bending, lifting greater than 5 lbs, or twisting; log-roll technique; frequent repositioning as instructed) within 4 days. Note:   PHYSICAL THERAPY TREATMENT  Patient: Cynthia Wilson (80 y.o. male)  Date: 5/21/2021  Diagnosis: Lumbar stenosis with neurogenic claudication [M48.062] <principal problem not specified>  Procedure(s) (LRB):  L3-L5 LAMINECTOMY, FUSION, INSTRUMENTATION, TLIF, BONE GRAFT (N/A) 4 Days Post-Op  Precautions:    Chart, physical therapy assessment, plan of care and goals were reviewed. ASSESSMENT  Patient continues with skilled PT services. Pt sit to stand with SBA back brace in place. Pt ambulated 200ft with RW SBA. Pt has cleared on steps. Pt left sitting. Pt is mobilizing well and is safe to ambulate with family in hallway. Pt is cleared for D/C by PT. Current Level of Function Impacting Discharge (mobility/balance): Pt SBA for mobility. PLAN :  Patient continues to benefit from skilled intervention to address the above impairments. Continue treatment per established plan of care. to address goals.     Recommendation for discharge: (in order for the patient to meet his/her long term goals)  Physical therapy at least 2 days/week in the home     This discharge recommendation:  Has been made in collaboration with the attending provider and/or case management    IF patient discharges home will need the following DME: rolling walker       SUBJECTIVE:       OBJECTIVE DATA SUMMARY:   Critical Behavior:  Neurologic State: Alert  Orientation Level: Oriented X4  Cognition: Appropriate decision making, Appropriate for age attention/concentration, Appropriate safety awareness, Follows commands     Functional Mobility Training:       Transfers:  Sit to Stand: SBA  Stand to Sit: SBA                Balance:  Sitting: Intact  Standing: With support; Intact  Ambulation/Gait Training:  Distance (ft): 200 Feet (ft)  Assistive Device: Gait belt;Walker, rolling  Ambulation - Level of Assistance: Stand-by assistance        Gait Abnormalities: Decreased step clearance        Base of Support: Narrowed     Speed/Zandra: Pace decreased (<100 feet/min)  Step Length: Right shortened;Left shortened                      Activity Tolerance:   Good    After treatment patient left in no apparent distress:   Sitting in chair    COMMUNICATION/COLLABORATION:   The patients plan of care was discussed with: Physical therapist.     Quinn Arevalo PTA   Time Calculation: 23 mins

## 2021-05-22 LAB
ANION GAP SERPL CALC-SCNC: 5 MMOL/L (ref 5–15)
BUN SERPL-MCNC: 13 MG/DL (ref 6–20)
BUN/CREAT SERPL: 12 (ref 12–20)
CALCIUM SERPL-MCNC: 8 MG/DL (ref 8.5–10.1)
CHLORIDE SERPL-SCNC: 105 MMOL/L (ref 97–108)
CO2 SERPL-SCNC: 28 MMOL/L (ref 21–32)
CREAT SERPL-MCNC: 1.05 MG/DL (ref 0.7–1.3)
GLUCOSE SERPL-MCNC: 101 MG/DL (ref 65–100)
HGB BLD-MCNC: 7.8 G/DL (ref 12.1–17)
POTASSIUM SERPL-SCNC: 3.6 MMOL/L (ref 3.5–5.1)
SODIUM SERPL-SCNC: 138 MMOL/L (ref 136–145)

## 2021-05-22 PROCEDURE — 65270000029 HC RM PRIVATE

## 2021-05-22 PROCEDURE — 94760 N-INVAS EAR/PLS OXIMETRY 1: CPT

## 2021-05-22 PROCEDURE — 80048 BASIC METABOLIC PNL TOTAL CA: CPT

## 2021-05-22 PROCEDURE — 97116 GAIT TRAINING THERAPY: CPT

## 2021-05-22 PROCEDURE — 85018 HEMOGLOBIN: CPT

## 2021-05-22 PROCEDURE — 36415 COLL VENOUS BLD VENIPUNCTURE: CPT

## 2021-05-22 PROCEDURE — 74011250637 HC RX REV CODE- 250/637: Performed by: ORTHOPAEDIC SURGERY

## 2021-05-22 PROCEDURE — 74011636637 HC RX REV CODE- 636/637: Performed by: PHYSICIAN ASSISTANT

## 2021-05-22 RX ADMIN — Medication 10 ML: at 17:20

## 2021-05-22 RX ADMIN — LISINOPRIL 40 MG: 20 TABLET ORAL at 08:57

## 2021-05-22 RX ADMIN — METHYLNALTREXONE BROMIDE 12 MG: 12 INJECTION, SOLUTION SUBCUTANEOUS at 17:19

## 2021-05-22 RX ADMIN — Medication 10 ML: at 21:47

## 2021-05-22 RX ADMIN — POLYETHYLENE GLYCOL 3350 17 G: 17 POWDER, FOR SOLUTION ORAL at 09:00

## 2021-05-22 RX ADMIN — DOCUSATE SODIUM 50MG AND SENNOSIDES 8.6MG 1 TABLET: 8.6; 5 TABLET, FILM COATED ORAL at 17:17

## 2021-05-22 RX ADMIN — Medication 10 ML: at 09:00

## 2021-05-22 RX ADMIN — Medication 10000 UNITS: at 08:57

## 2021-05-22 RX ADMIN — ALLOPURINOL 600 MG: 300 TABLET ORAL at 08:58

## 2021-05-22 RX ADMIN — AMLODIPINE BESYLATE 10 MG: 5 TABLET ORAL at 08:58

## 2021-05-22 RX ADMIN — SPIRONOLACTONE 25 MG: 25 TABLET ORAL at 08:58

## 2021-05-22 RX ADMIN — FAMOTIDINE 20 MG: 20 TABLET, FILM COATED ORAL at 08:58

## 2021-05-22 RX ADMIN — DOXAZOSIN 8 MG: 2 TABLET ORAL at 08:59

## 2021-05-22 RX ADMIN — DOCUSATE SODIUM 50MG AND SENNOSIDES 8.6MG 1 TABLET: 8.6; 5 TABLET, FILM COATED ORAL at 08:57

## 2021-05-22 RX ADMIN — FINASTERIDE 5 MG: 5 TABLET, FILM COATED ORAL at 08:58

## 2021-05-22 RX ADMIN — FAMOTIDINE 20 MG: 20 TABLET, FILM COATED ORAL at 17:17

## 2021-05-22 NOTE — PROGRESS NOTES
ORTHOPAEDIC LUMBAR FUSION PROGRESS NOTE    NAME:     Richad Scheuermann   :       1950   MRN:       [id number]   DATE:      2021    POD:    5 Days Post-Op  S/P:    Procedure(s):  L3-L5 LAMINECTOMY, FUSION, INSTRUMENTATION, TLIF, BONE GRAFT    SUBJECTIVE:    C/O back pain along surgical incision  No leg pain or numbness  Denies nausea/vomiting, headache, chest pain or shortness of breath  Pain controlled    Abdomen still distended. Tolerating sips of clears  Has passed a good amnt flatus per pt report    Recent Labs     21  0231 21  0258   HGB 7.8* 8.9*  8.8*   HCT  --  27.2*    138   K 3.6 3.5    104   CO2 28 29   BUN 13 15   CREA 1.05 1.00   * 143*     Patient Vitals for the past 12 hrs:   BP Temp Pulse Resp SpO2   21 1203 (!) 154/79 99.4 °F (37.4 °C) 93 16 92 %   21 0756 (!) 157/82 98.1 °F (36.7 °C) 82 16 93 %   21 0308 (!) 146/61 99.1 °F (37.3 °C) 81 16 92 %       EXAM:  Dressings clean, dry and intact   Positive strength/ROM bilat lower ext. Neuro intact to sensation  Calves, soft & nontender  BL LEs NVID    Abdomen: Distended. Improved BS in all quadrants. No tenderness. No rigidity, rebound or guarding.        PLAN:  Clear liquid diet  Continue prn PO pain medications- try to limit due to suspected ileus  Continue relistor  Soap suds enema  Mobilize patient      Yoshi Marshall, [address]  Orthopaedic Surgery  Physician Assistant to Dr. Gordon Haney

## 2021-05-22 NOTE — PROGRESS NOTES
Problem: Falls - Risk of  Goal: *Absence of Falls  Description: Document Manohar Short Fall Risk and appropriate interventions in the flowsheet. Outcome: Progressing Towards Goal  Note: Fall Risk Interventions:  Mobility Interventions: Bed/chair exit alarm, Patient to call before getting OOB, Utilize walker, cane, or other assistive device, Utilize gait belt for transfers/ambulation         Medication Interventions: Bed/chair exit alarm, Patient to call before getting OOB, Teach patient to arise slowly, Utilize gait belt for transfers/ambulation    Elimination Interventions: Bed/chair exit alarm, Call light in reach, Patient to call for help with toileting needs              Problem: Patient Education: Go to Patient Education Activity  Goal: Patient/Family Education  Outcome: Progressing Towards Goal     Problem: Pressure Injury - Risk of  Goal: *Prevention of pressure injury  Description: Document Tyrone Scale and appropriate interventions in the flowsheet. Outcome: Progressing Towards Goal  Note: Pressure Injury Interventions:             Activity Interventions: Increase time out of bed, PT/OT evaluation    Mobility Interventions: HOB 30 degrees or less, PT/OT evaluation    Nutrition Interventions: Document food/fluid/supplement intake    Friction and Shear Interventions: HOB 30 degrees or less                Problem: Patient Education: Go to Patient Education Activity  Goal: Patient/Family Education  Outcome: Progressing Towards Goal     Problem: Patient Education: Go to Patient Education Activity  Goal: Patient/Family Education  Outcome: Progressing Towards Goal

## 2021-05-22 NOTE — PROGRESS NOTES
Problem: Mobility Impaired (Adult and Pediatric)  Goal: *Acute Goals and Plan of Care (Insert Text)  Description: FUNCTIONAL STATUS PRIOR TO ADMISSION: indep; used straight cane due to LE pain    HOME SUPPORT PRIOR TO ADMISSION: The patient lived alone with no local support. Physical Therapy Goals  Initiated 5/18/2021    1. Patient will move from supine to sit and sit to supine  in bed with modified independence within 4 days. 2. Patient will perform sit to stand with modified independence within 4 days. 3. Patient will ambulate with modified independence for 150 feet with the least restrictive device within 4 days. 4. Patient will ascend/descend 3 stairs with 1 handrail(s) with minimal assistance/contact guard assist within 4 days. 5. Patient will verbalize and demonstrate understanding of spinal precautions (No bending, lifting greater than 5 lbs, or twisting; log-roll technique; frequent repositioning as instructed) within 4 days. Outcome: Progressing Towards Goal  Note:   PHYSICAL THERAPY TREATMENT  Patient: Joceline Simon (12 y.o. male)  Date: 5/22/2021  Diagnosis: Lumbar stenosis with neurogenic claudication [M48.062] <principal problem not specified>  Procedure(s) (LRB):  L3-L5 LAMINECTOMY, FUSION, INSTRUMENTATION, TLIF, BONE GRAFT (N/A) 5 Days Post-Op  Precautions:   No bending, no lifting greater than 5 lbs, no twisting, log-roll technique, repositioning every 20-30 min except when sleeping, brace when OOB (if ordered)  Chart, physical therapy assessment, plan of care and goals were reviewed. ASSESSMENT  Patient continues with skilled PT services and is progressing towards goals. Pt received up in chair, denies LBP reports gout in R knee tho lydia to WB. SBA for functional transfers using RW. Verbalized and maintained back precautions and wear schedule for LSO. Pt successfully performed stair training during previous session.  Pt is cleared for discharge from PT standpoint when medically stable. Current Level of Function Impacting Discharge (mobility/balance): SBA    Other factors to consider for discharge: none         PLAN :  Patient continues to benefit from skilled intervention to address the above impairments. Continue treatment per established plan of care. to address goals. Recommendation for discharge: (in order for the patient to meet his/her long term goals)  Physical therapy at least 2 days/week in the home     This discharge recommendation:  Has been made in collaboration with the attending provider and/or case management    IF patient discharges home will need the following DME: rolling walker       SUBJECTIVE:   Patient stated my knee acts up sometimes.     OBJECTIVE DATA SUMMARY:   Critical Behavior:  Neurologic State: Alert  Orientation Level: Oriented X4  Cognition: Appropriate decision making, Appropriate for age attention/concentration, Appropriate safety awareness, Follows commands       Spinal diagnosis intervention:  The patient stated 3/3 back precautions when prompted. Reviewed all 3 back precautions, log roll technique, and sitting for 30 minutes at a time. The patient required few cues to maintain back precautions during functional activity. Reviewed back brace application and wear schedule. Brace donned with supervision/set-up      Functional Mobility Training:    Bed Mobility:  Log                   Transfers:  Sit to Stand: Stand-by assistance  Stand to Sit: Stand-by assistance                             Balance:  Sitting: Intact  Standing: Intact; With support  Ambulation/Gait Training:  Distance (ft): 100 Feet (ft)  Assistive Device: Walker, rolling;Gait belt  Ambulation - Level of Assistance: Stand-by assistance        Gait Abnormalities: Decreased step clearance        Base of Support: Narrowed     Speed/Zandra: Pace decreased (<100 feet/min)                       Stairs:               Therapeutic Exercises:     Pain Rating:  Denies LBP- 2/10 R knee pain    Activity Tolerance:   Good    After treatment patient left in no apparent distress:   Sitting in chair and Call bell within reach    COMMUNICATION/COLLABORATION:   The patients plan of care was discussed with: Registered nurse.      nEma Hale   Time Calculation: 14 mins

## 2021-05-22 NOTE — PROGRESS NOTES
ORTHOPAEDIC LUMBAR FUSION PROGRESS NOTE    NAME:     Moisés Pgaan   :       1950   MRN:       [id number]   DATE:      2021    POD:    4 Days Post-Op  S/P:    Procedure(s):  L3-L5 LAMINECTOMY, FUSION, INSTRUMENTATION, TLIF, BONE GRAFT    SUBJECTIVE:    C/O abdominal fullness/distension, no abdominal pain or cramping  No leg pain or numbness  Denies nausea/vomiting, headache, chest pain or shortness of breath  Pain controlled      Vitals and labs reviewed    EXAM:  Dressings clean, dry and intact   Positive strength/ROM bilat lower ext. Neuro intact to sensation  Calves, soft & nontender  BL LEs NVID     Abdomen: Abdomen distended. Hypoactive BS in all quadrants. No tenderness.  No rigidity, rebound or guarding.         PLAN:  Sips of clears, possible progression to clear liquid diet  Continue prn PO pain medications- try to limit due to suspected ileus  PT/OT, OOB w/ assist  Mobilize patient      Katerina Dumont [place]  Orthopaedic Surgery  Physician Assistant to Dr. Vilma Denton

## 2021-05-23 LAB
ANION GAP SERPL CALC-SCNC: 7 MMOL/L (ref 5–15)
BUN SERPL-MCNC: 8 MG/DL (ref 6–20)
BUN/CREAT SERPL: 9 (ref 12–20)
CALCIUM SERPL-MCNC: 7.8 MG/DL (ref 8.5–10.1)
CHLORIDE SERPL-SCNC: 107 MMOL/L (ref 97–108)
CO2 SERPL-SCNC: 25 MMOL/L (ref 21–32)
CREAT SERPL-MCNC: 0.86 MG/DL (ref 0.7–1.3)
GLUCOSE SERPL-MCNC: 106 MG/DL (ref 65–100)
HGB BLD-MCNC: 7.7 G/DL (ref 12.1–17)
POTASSIUM SERPL-SCNC: 3.3 MMOL/L (ref 3.5–5.1)
SODIUM SERPL-SCNC: 139 MMOL/L (ref 136–145)

## 2021-05-23 PROCEDURE — 74011250637 HC RX REV CODE- 250/637: Performed by: ORTHOPAEDIC SURGERY

## 2021-05-23 PROCEDURE — 85018 HEMOGLOBIN: CPT

## 2021-05-23 PROCEDURE — 80048 BASIC METABOLIC PNL TOTAL CA: CPT

## 2021-05-23 PROCEDURE — 94760 N-INVAS EAR/PLS OXIMETRY 1: CPT

## 2021-05-23 PROCEDURE — 65270000029 HC RM PRIVATE

## 2021-05-23 PROCEDURE — 36415 COLL VENOUS BLD VENIPUNCTURE: CPT

## 2021-05-23 RX ADMIN — FAMOTIDINE 20 MG: 20 TABLET, FILM COATED ORAL at 18:41

## 2021-05-23 RX ADMIN — Medication 10 ML: at 21:36

## 2021-05-23 RX ADMIN — FAMOTIDINE 20 MG: 20 TABLET, FILM COATED ORAL at 09:03

## 2021-05-23 RX ADMIN — ALLOPURINOL 600 MG: 300 TABLET ORAL at 09:03

## 2021-05-23 RX ADMIN — ACETAMINOPHEN 650 MG: 325 TABLET ORAL at 15:40

## 2021-05-23 RX ADMIN — LISINOPRIL 40 MG: 20 TABLET ORAL at 09:01

## 2021-05-23 RX ADMIN — AMLODIPINE BESYLATE 10 MG: 5 TABLET ORAL at 09:04

## 2021-05-23 RX ADMIN — DOCUSATE SODIUM 50MG AND SENNOSIDES 8.6MG 1 TABLET: 8.6; 5 TABLET, FILM COATED ORAL at 09:03

## 2021-05-23 RX ADMIN — Medication 10 ML: at 06:15

## 2021-05-23 RX ADMIN — SPIRONOLACTONE 25 MG: 25 TABLET ORAL at 09:03

## 2021-05-23 RX ADMIN — DOCUSATE SODIUM 50MG AND SENNOSIDES 8.6MG 1 TABLET: 8.6; 5 TABLET, FILM COATED ORAL at 18:37

## 2021-05-23 RX ADMIN — Medication 10000 UNITS: at 09:01

## 2021-05-23 RX ADMIN — DOXAZOSIN 8 MG: 2 TABLET ORAL at 09:00

## 2021-05-23 RX ADMIN — FINASTERIDE 5 MG: 5 TABLET, FILM COATED ORAL at 09:02

## 2021-05-23 NOTE — PROGRESS NOTES
Subjective:    Martina Conway is a 69 y.o. male who is POD 6 L3-L5 LAMINECTOMY, FUSION, INSTRUMENTATION, TLIF, BONE GRAFT    Major Events:.     Pain controlled, reports BM this morning while in bed with solid stool    Objective:    Vital signs in last 24 hours:    Temp:  [98.1 °F (36.7 °C)-100.1 °F (37.8 °C)]   Pulse (Heart Rate):  [78-95]   BP: (122-157)/(61-82)   Resp Rate:  [16]   O2 Sat (%):  [91 %-95 %]   Weight: --    Temp (24hrs), Av.1 °F (37.3 °C), Min:98.9 °F (37.2 °C), Max:99.4 °F (37.4 °C)      Labs:    Lab Results   Component Value Date/Time    WBC 9.7 2021 02:58 AM    HGB 7.7 (L) 2021 02:52 AM    HCT 27.2 (L) 2021 02:58 AM    PLATELET 217  02:58 AM       Lab Results   Component Value Date/Time    Sodium 139 2021 02:52 AM    Potassium 3.3 (L) 2021 02:52 AM    Chloride 107 2021 02:52 AM    CO2 25 2021 02:52 AM    BUN 8 2021 02:52 AM       Physical Exam:    General: alert, cooperative, in NAD  Nonlabored resp    Dressing: c/d/i, drain intact with 20ml serosanguinous drainage    Motor: + ankle df/pf    Sensory: SILT    Vascular: Brisk capillary refill in toes    Abd distended but soft and non-ttp in all 4 quadrants    Assessment/Plan:    · Pain management: as written    · DVT Prophylaxis: SCD's    · PT/OT: WBAT, encourage mobilization     · Dispo: pending PT evaluation, when ileus resolves and drain removed    Tova Lacy PA-C

## 2021-05-23 NOTE — PROGRESS NOTES
Physical Therapy    840 Pt received up in chair, reporting up ad anna walking hallways using RW. No complaints. Pt is cleared for d/c from PT standpoint    Hubbard Dubin.  Antwan Mejias

## 2021-05-24 ENCOUNTER — APPOINTMENT (OUTPATIENT)
Dept: GENERAL RADIOLOGY | Age: 71
DRG: 454 | End: 2021-05-24
Attending: PHYSICIAN ASSISTANT
Payer: MEDICARE

## 2021-05-24 LAB
ANION GAP SERPL CALC-SCNC: 6 MMOL/L (ref 5–15)
BASOPHILS # BLD: 0 K/UL (ref 0–0.1)
BASOPHILS NFR BLD: 1 % (ref 0–1)
BUN SERPL-MCNC: 6 MG/DL (ref 6–20)
BUN/CREAT SERPL: 7 (ref 12–20)
CALCIUM SERPL-MCNC: 7.9 MG/DL (ref 8.5–10.1)
CHLORIDE SERPL-SCNC: 109 MMOL/L (ref 97–108)
CO2 SERPL-SCNC: 25 MMOL/L (ref 21–32)
CREAT SERPL-MCNC: 0.82 MG/DL (ref 0.7–1.3)
DIFFERENTIAL METHOD BLD: ABNORMAL
EOSINOPHIL # BLD: 0.2 K/UL (ref 0–0.4)
EOSINOPHIL NFR BLD: 4 % (ref 0–7)
ERYTHROCYTE [DISTWIDTH] IN BLOOD BY AUTOMATED COUNT: 13.3 % (ref 11.5–14.5)
GLUCOSE SERPL-MCNC: 106 MG/DL (ref 65–100)
HCT VFR BLD AUTO: 24.4 % (ref 36.6–50.3)
HGB BLD-MCNC: 7.6 G/DL (ref 12.1–17)
HGB BLD-MCNC: 7.8 G/DL (ref 12.1–17)
IMM GRANULOCYTES # BLD AUTO: 0 K/UL (ref 0–0.04)
IMM GRANULOCYTES NFR BLD AUTO: 0 % (ref 0–0.5)
LYMPHOCYTES # BLD: 1 K/UL (ref 0.8–3.5)
LYMPHOCYTES NFR BLD: 17 % (ref 12–49)
MCH RBC QN AUTO: 31.1 PG (ref 26–34)
MCHC RBC AUTO-ENTMCNC: 32 G/DL (ref 30–36.5)
MCV RBC AUTO: 97.2 FL (ref 80–99)
MONOCYTES # BLD: 0.8 K/UL (ref 0–1)
MONOCYTES NFR BLD: 13 % (ref 5–13)
NEUTS SEG # BLD: 3.9 K/UL (ref 1.8–8)
NEUTS SEG NFR BLD: 65 % (ref 32–75)
NRBC # BLD: 0 K/UL (ref 0–0.01)
NRBC BLD-RTO: 0 PER 100 WBC
PLATELET # BLD AUTO: 270 K/UL (ref 150–400)
PMV BLD AUTO: 10.6 FL (ref 8.9–12.9)
POTASSIUM SERPL-SCNC: 3.2 MMOL/L (ref 3.5–5.1)
RBC # BLD AUTO: 2.51 M/UL (ref 4.1–5.7)
SODIUM SERPL-SCNC: 140 MMOL/L (ref 136–145)
WBC # BLD AUTO: 5.9 K/UL (ref 4.1–11.1)

## 2021-05-24 PROCEDURE — 36415 COLL VENOUS BLD VENIPUNCTURE: CPT

## 2021-05-24 PROCEDURE — 85018 HEMOGLOBIN: CPT

## 2021-05-24 PROCEDURE — 74011250637 HC RX REV CODE- 250/637: Performed by: ORTHOPAEDIC SURGERY

## 2021-05-24 PROCEDURE — 65270000029 HC RM PRIVATE

## 2021-05-24 PROCEDURE — 80048 BASIC METABOLIC PNL TOTAL CA: CPT

## 2021-05-24 PROCEDURE — 74011250637 HC RX REV CODE- 250/637: Performed by: NURSE PRACTITIONER

## 2021-05-24 PROCEDURE — 74018 RADEX ABDOMEN 1 VIEW: CPT

## 2021-05-24 PROCEDURE — 85025 COMPLETE CBC W/AUTO DIFF WBC: CPT

## 2021-05-24 PROCEDURE — 94760 N-INVAS EAR/PLS OXIMETRY 1: CPT

## 2021-05-24 RX ADMIN — FINASTERIDE 5 MG: 5 TABLET, FILM COATED ORAL at 08:34

## 2021-05-24 RX ADMIN — DOXAZOSIN 8 MG: 2 TABLET ORAL at 08:33

## 2021-05-24 RX ADMIN — DOCUSATE SODIUM 50MG AND SENNOSIDES 8.6MG 1 TABLET: 8.6; 5 TABLET, FILM COATED ORAL at 18:21

## 2021-05-24 RX ADMIN — ACETAMINOPHEN 650 MG: 325 TABLET ORAL at 06:27

## 2021-05-24 RX ADMIN — OXYCODONE 5 MG: 5 TABLET ORAL at 18:23

## 2021-05-24 RX ADMIN — Medication 10000 UNITS: at 08:33

## 2021-05-24 RX ADMIN — FAMOTIDINE 20 MG: 20 TABLET, FILM COATED ORAL at 08:34

## 2021-05-24 RX ADMIN — Medication 10 ML: at 06:27

## 2021-05-24 RX ADMIN — FAMOTIDINE 20 MG: 20 TABLET, FILM COATED ORAL at 18:21

## 2021-05-24 RX ADMIN — DOCUSATE SODIUM 50MG AND SENNOSIDES 8.6MG 1 TABLET: 8.6; 5 TABLET, FILM COATED ORAL at 08:33

## 2021-05-24 RX ADMIN — ALLOPURINOL 600 MG: 300 TABLET ORAL at 08:34

## 2021-05-24 RX ADMIN — SPIRONOLACTONE 25 MG: 25 TABLET ORAL at 08:34

## 2021-05-24 RX ADMIN — POTASSIUM BICARBONATE 40 MEQ: 782 TABLET, EFFERVESCENT ORAL at 12:53

## 2021-05-24 RX ADMIN — POTASSIUM BICARBONATE 40 MEQ: 782 TABLET, EFFERVESCENT ORAL at 18:21

## 2021-05-24 RX ADMIN — AMLODIPINE BESYLATE 10 MG: 5 TABLET ORAL at 08:34

## 2021-05-24 RX ADMIN — LISINOPRIL 40 MG: 20 TABLET ORAL at 08:34

## 2021-05-24 NOTE — CONSULTS
Assessment:   70 yo man with resolving ileus after L3-L5 laminectomy with fusion      Plan:   KUB improved distention - the radiographic findings will lag behind clinical exam  OOB ambulate  Tolerating clears ok to advance to fulls  If tolerates full liquids may d/c home       Signed By: Duy Montes MD   [facility]  [phone]    May 24, 2021          General Surgery Consult    Subjective:      Yogesh Kaur is a 69 y.o.  male who presents with post operative ileus. He had a lumbar fusion 7 days ago and had had abdominal distention. He reports that he has noted an improvement in his abdominal distention. He reports flatus and BM today. He has tolerated clear liquids. He denies any pain. 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/06/2021    [place]     Past Surgical History:   Procedure Laterality Date    HX CATARACT REMOVAL Right     HX COLONOSCOPY        Family History   Problem Relation Age of Onset    Anesth Problems Neg Hx     Clotting Disorder Neg Hx      Social History     Socioeconomic History    Marital status:      Spouse name: Not on file    Number of children: Not on file    Years of education: Not on file    Highest education level: Not on file   Tobacco Use    Smoking status: Never Smoker    Smokeless tobacco: Never Used   Substance and Sexual Activity    Alcohol use: Yes     Comment: social    Drug use: Never     Social Determinants of Health     Financial Resource Strain:     Difficulty of Paying Living Expenses:    Food Insecurity:     Worried About Running Out of Food in the Last Year:     Ran Out of Food in the Last Year:    Transportation Needs:     Lack of Transportation (Medical):      Lack of Transportation (Non-Medical):    Physical Activity:     Days of Exercise per Week:     Minutes of Exercise per Session:    Stress:     Feeling of Stress :    Social Connections:     Frequency of Communication with Friends and Family:     Frequency of Social Gatherings with Friends and Family:     Attends Sabianism Services:     Active Member of Clubs or Organizations:     Attends Club or Organization Meetings:     Marital Status:       Current Facility-Administered Medications   Medication Dose Route Frequency    potassium bicarb-citric acid (EFFER-K) tablet 40 mEq  40 mEq Oral BID    0.9% sodium chloride infusion  50 mL/hr IntraVENous CONTINUOUS    doxazosin (CARDURA) tablet 8 mg  8 mg Oral DAILY    lisinopriL (PRINIVIL, ZESTRIL) tablet 40 mg  40 mg Oral DAILY    finasteride (PROSCAR) tablet 5 mg  5 mg Oral DAILY    amLODIPine (NORVASC) tablet 10 mg  10 mg Oral DAILY    allopurinoL (ZYLOPRIM) tablet 600 mg  600 mg Oral DAILY    spironolactone (ALDACTONE) tablet 25 mg  25 mg Oral DAILY    cholecalciferol (VITAMIN D3) (5000 Units/125 mcg) tablet 10,000 Units  10,000 Units Oral DAILY    benzocaine-menthoL (CEPACOL) lozenge 1 Lozenge  1 Lozenge Mucous Membrane PRN    sodium chloride (NS) flush 5-40 mL  5-40 mL IntraVENous Q8H    sodium chloride (NS) flush 5-40 mL  5-40 mL IntraVENous PRN    naloxone (NARCAN) injection 0.4 mg  0.4 mg IntraVENous PRN    senna-docusate (PERICOLACE) 8.6-50 mg per tablet 1 Tab  1 Tablet Oral BID    bisacodyL (DULCOLAX) suppository 10 mg  10 mg Rectal DAILY PRN    acetaminophen (TYLENOL) tablet 650 mg  650 mg Oral Q6H PRN    oxyCODONE IR (ROXICODONE) tablet 5 mg  5 mg Oral Q3H PRN    oxyCODONE IR (ROXICODONE) tablet 10 mg  10 mg Oral Q3H PRN    famotidine (PEPCID) tablet 20 mg  20 mg Oral BID    diphenhydrAMINE (BENADRYL) injection 12.5 mg  12.5 mg IntraVENous Q6H PRN    cyclobenzaprine (FLEXERIL) tablet 10 mg  10 mg Oral TID PRN    butalbital-acetaminophen-caffeine (FIORICET, ESGIC) -40 mg per tablet 1 Tab  1 Tablet Oral Q6H PRN    butalbital-acetaminophen-caffeine (FIORICET, ESGIC) -40 mg per tablet 2 Tab  2 Tablet Oral Q6H PRN      No Known Allergies    Review of Systems:     []     Unable to obtain  ROS due to  []    mental status change  []    sedated   []    intubated   [x]    Total of 12 system negative, unless specified below or in HPI:  Constitutional: negative fever, negative chills, negative weight loss  Eyes:   negative visual changes  ENT:   negative sore throat, tongue or lip swelling  Respiratory:  negative cough, negative dyspnea  Cards:  negative for chest pain, palpitations, lower extremity edema  GI:   negative for nausea, vomiting, diarrhea, and abdominal pain  :  negative for frequency, dysuria  Integument:  negative for rash and pruritus  Heme:  negative for easy bruising and gum/nose bleeding  Musculoskel: negative for myalgias,  back pain and muscle weakness  Neuro:  negative for headaches, dizziness, vertigo  Psych:  negative for feelings of anxiety, depression     Objective:        Patient Vitals for the past 8 hrs:   BP Temp Pulse Resp SpO2   21 1158 (!) 166/78 97.6 °F (36.4 °C) 65 16 94 %   21 0808 (!) 163/85 98.4 °F (36.9 °C) 77 16 96 %       Temp (24hrs), Av.5 °F (36.9 °C), Min:97.6 °F (36.4 °C), Max:99.2 °F (37.3 °C)      Physical Exam:  General:  Alert, cooperative, no distress, appears stated age. Eyes:  Conjunctivae clear. PERRL, EOMs intact. Nose: Nares normal. Septum midline. Mucosa normal. No drainage or sinus tenderness. Mouth/Throat: Lips, mucosa, and tongue normal. Teeth and gums normal.   Neck: Supple, symmetrical, trachea midline       Lungs:   Aerating well, no distress   Heart:  Regular rate and rhythm   Abdomen:   Soft, distended, non-tender. Bowel sounds normal.   Extremities: Extremities normal, atraumatic, no cyanosis or edema.        Skin: Skin color, texture, turgor normal. No rashes or lesions   Lymph nodes: Cervical, supraclavicular, and axillary nodes normal.     Labs:   Recent Labs     05/24/21  0344   WBC 5.9   HGB 7.8*  7.6*   HCT 24.4*        Recent Labs     05/24/21  0344      K 3.2*   *   CO2 25   *   BUN 6   CREA 0.82   CA 7.9*     No results for input(s): INR, INREXT in the last 72 hours.

## 2021-05-24 NOTE — PROGRESS NOTES
ORTHOPAEDIC LUMBAR FUSION PROGRESS NOTE    NAME:     Martina Conway   :       1950   MRN:       [id number]   DATE:      2021    POD:    7 Days Post-Op  S/P:    Procedure(s):  L3-L5 LAMINECTOMY, FUSION, INSTRUMENTATION, TLIF, BONE GRAFT    SUBJECTIVE:    C/O back pain along surgical incision  No leg pain or numbness  Had a one time non-positional HA yesterday that resolved with tylenol and rest  Denies nausea/vomiting, positional headache, chest pain or shortness of breath  Pain controlled    Pt reports that abdominal distension is improved. Tolerating clear liquids  Denies abd pain, n/v  Had a BM yesterday with a piece of some semi-solid stool    Recent Labs     21  0344   HGB 7.6*      K 3.2*   *   CO2 25   BUN 6   CREA 0.82   *     Patient Vitals for the past 12 hrs:   BP Temp Pulse Resp SpO2   21 0356 (!) 155/72 99.2 °F (37.3 °C) 80 16 92 %   21 2355 (!) 151/79 98.9 °F (37.2 °C) 80 16 93 %       EXAM:  Dressings clean, dry and intact   Positive strength/ROM bilat lower ext. Neuro intact to sensation  Calves, soft & nontender  BL LEs NVID    Abdomen: Distension improved. Mildly hypoactive BS in all quadrants. No tympany. No tenderness. No rigidity, rebound or guarding.      PLAN:  Clear liquid diet  Continue prn PO pain medications- try to limit due to suspected ileus  Mobilize patient  Will get new KUB today      Alona [place]  Orthopaedic Surgery  Physician Assistant to Dr. Zuahir Johnson      1:48 PM   New abdominal x-ray reviewed  Pt has had 3 doses of relistor, soap suds enemas and has been on sips of clears/clear fluids  Will consult General Surgery re: ileus    PILY Seth

## 2021-05-24 NOTE — PROGRESS NOTES
Bedside shift change report given to Jimy Choe (oncoming nurse) by Rosemary Cavazos (offgoing nurse). Report included the following information SBAR.

## 2021-05-24 NOTE — PROGRESS NOTES
Patient accidentally pulled out IV line while adjusting back brace. Rika Pradhan NP notified and said it was ok to leave IV out and DC fluids. Will continue to monitor.

## 2021-05-24 NOTE — PROGRESS NOTES
Problem: Falls - Risk of  Goal: *Absence of Falls  Description: Document Varun [place] Fall Risk and appropriate interventions in the flowsheet. Outcome: Progressing Towards Goal  Note: Fall Risk Interventions:  Mobility Interventions: Communicate number of staff needed for ambulation/transfer         Medication Interventions: Teach patient to arise slowly, Patient to call before getting OOB    Elimination Interventions: Urinal in reach              Problem: Pressure Injury - Risk of  Goal: *Prevention of pressure injury  Description: Document Tyrone Scale and appropriate interventions in the flowsheet. Outcome: Progressing Towards Goal  Note: Pressure Injury Interventions:             Activity Interventions: Increase time out of bed, Pressure redistribution bed/mattress(bed type)    Mobility Interventions: HOB 30 degrees or less, Pressure redistribution bed/mattress (bed type)    Nutrition Interventions: Document food/fluid/supplement intake    Friction and Shear Interventions: HOB 30 degrees or less

## 2021-05-24 NOTE — PROGRESS NOTES
Comprehensive Nutrition Assessment    Type and Reason for Visit: Reassess    Nutrition Recommendations/Plan:   1. Advance diet as medically feasible/tolerated. 2. Added apple Ensure Clear BID while on CLD to promote protein intake. Nutrition Assessment:      5/24: Follow up. Pt continues on clear liquid diet. Plan for KUB today. Ensure Clear order appears to have been canceled shortly after it was ordered on 5/21 d/t pt being made NPO at that time. Pt says he never received it but would like to. Will add again to diet order. Pt says his abd discomfort is improving. Tolerating clears. No c/o nausea. Says he ate all of his breakfast this AM but vomited afterward (thinks it was d/t the broth because he doesn't like it). Will monitor for diet advancement. Labs- K 3.2, Hgb 7.6. Meds- vit. D3, pericolace, aldactone. 5/21: 68 y/o female admitted with lumbar stenosis. S/p surgery 5/17. Pt with abd discomfort and distension since yesterday. Multiple BMs since then. Pt says discomfort was better this AM but worsened after trying to consume the clear liquids for lunch. Eating well PTA. Denies any recent weight changes. No c/o N/V. Pt agreeable to trying Ensure Clear for extra protein while on clears. Will monitor tolerance/acceptance. Labs- Hgb 8.2, -[phone]. Nayely- [email or web address], vit. D3, bowel regimen, aldactone. Documented Meal intake:  Patient Vitals for the past 168 hrs:   % Diet Eaten   05/24/21 1057 1 - 25%   05/21/21 1554 0%   05/19/21 0800 76 - 100%   05/18/21 1806 51 - 75%   05/18/21 0922 26 - 50%   05/17/21 2000 51 - 75%   05/17/21 1815 51 - 75%     Malnutrition Assessment:  Malnutrition Status: none identified    Estimated Daily Nutrient Needs:  Energy (kcal): 1982 (1525 x 1.3 AF); Weight Used for Energy Requirements: Current  Protein (g): 81 (1-1.2 g/kg);  Weight Used for Protein Requirements: Current  Fluid (ml/day): 1892; Method Used for Fluid Requirements: 1 ml/kcal      Nutrition Related Findings:  last BM 5/23      Wounds:    Surgical incision       Current Nutrition Therapies:  DIET NUTRITIONAL SUPPLEMENTS Breakfast, Dinner; Ensure Clear  DIET CLEAR LIQUID    Anthropometric Measures:  · Height:  5' 7\" (170.2 cm)  · Current Body Wt:  81.2 kg (179 lb)   · Admission Body Wt:       · Usual Body Wt:        · Ideal Body Wt:  148 lbs:  120.9 %   · Adjusted Body Weight:   ; Weight Adjustment for:     · Adjusted BMI:       · BMI Category: Overweight (BMI 25.0-29. 9)       Nutrition Diagnosis:   · Increased nutrient needs related to increased demand for energy/nutrients (protein) as evidenced by  (s/p surgery)    5/24: Nutrition dx continues. Nutrition Interventions:   Food and/or Nutrient Delivery: Start oral nutrition supplement, Modify current diet  Nutrition Education and Counseling: No recommendations at this time  Coordination of Nutrition Care: Continue to monitor while inpatient    Goals:  Diet advancement with po intake >50% meals/supplements meeting protein needs next 1-3 days       Nutrition Monitoring and Evaluation:   Behavioral-Environmental Outcomes: None identified  Food/Nutrient Intake Outcomes: Diet advancement/tolerance, Food and nutrient intake, Supplement intake  Physical Signs/Symptoms Outcomes: Weight, Biochemical data, GI status    Discharge Planning:     Too soon to determine     Electronically signed by Tab Hunt RDN on 5/24/2021     Contact: [phone]

## 2021-05-24 NOTE — PROGRESS NOTES
5/24/2021  10:01 AM  RUR:  8%  Risk Level: [x]Low []Moderate []High  Value-based purchasing: [] Yes [x] No  Bundle patient: [] Yes [x] No   Specify:     Transition of care plan:  1. Awaiting medical clearance and DC order. KUB today. 2. Home with [facility] with [facility]. 3. Outpatient follow-up. 4. Pt's family to transport.

## 2021-05-25 LAB
ANION GAP SERPL CALC-SCNC: 6 MMOL/L (ref 5–15)
BUN SERPL-MCNC: 5 MG/DL (ref 6–20)
BUN/CREAT SERPL: 6 (ref 12–20)
CALCIUM SERPL-MCNC: 8.4 MG/DL (ref 8.5–10.1)
CHLORIDE SERPL-SCNC: 107 MMOL/L (ref 97–108)
CO2 SERPL-SCNC: 28 MMOL/L (ref 21–32)
CREAT SERPL-MCNC: 0.83 MG/DL (ref 0.7–1.3)
GLUCOSE SERPL-MCNC: 96 MG/DL (ref 65–100)
HGB BLD-MCNC: 8 G/DL (ref 12.1–17)
MAGNESIUM SERPL-MCNC: 2.3 MG/DL (ref 1.6–2.4)
POTASSIUM SERPL-SCNC: 3.7 MMOL/L (ref 3.5–5.1)
SODIUM SERPL-SCNC: 141 MMOL/L (ref 136–145)

## 2021-05-25 PROCEDURE — 85018 HEMOGLOBIN: CPT

## 2021-05-25 PROCEDURE — 80048 BASIC METABOLIC PNL TOTAL CA: CPT

## 2021-05-25 PROCEDURE — 83735 ASSAY OF MAGNESIUM: CPT

## 2021-05-25 PROCEDURE — 36415 COLL VENOUS BLD VENIPUNCTURE: CPT

## 2021-05-25 PROCEDURE — 77030041075 HC DRSG AG OPTIFRM MDII -B

## 2021-05-25 PROCEDURE — 74011250637 HC RX REV CODE- 250/637: Performed by: ORTHOPAEDIC SURGERY

## 2021-05-25 PROCEDURE — 65270000029 HC RM PRIVATE

## 2021-05-25 PROCEDURE — 74011250637 HC RX REV CODE- 250/637: Performed by: NURSE PRACTITIONER

## 2021-05-25 PROCEDURE — 94760 N-INVAS EAR/PLS OXIMETRY 1: CPT

## 2021-05-25 RX ADMIN — SPIRONOLACTONE 25 MG: 25 TABLET ORAL at 09:11

## 2021-05-25 RX ADMIN — Medication 10000 UNITS: at 09:12

## 2021-05-25 RX ADMIN — FINASTERIDE 5 MG: 5 TABLET, FILM COATED ORAL at 09:12

## 2021-05-25 RX ADMIN — FAMOTIDINE 20 MG: 20 TABLET, FILM COATED ORAL at 17:23

## 2021-05-25 RX ADMIN — DOCUSATE SODIUM 50MG AND SENNOSIDES 8.6MG 1 TABLET: 8.6; 5 TABLET, FILM COATED ORAL at 17:23

## 2021-05-25 RX ADMIN — ACETAMINOPHEN 650 MG: 325 TABLET ORAL at 12:29

## 2021-05-25 RX ADMIN — ALLOPURINOL 600 MG: 300 TABLET ORAL at 09:12

## 2021-05-25 RX ADMIN — DOCUSATE SODIUM 50MG AND SENNOSIDES 8.6MG 1 TABLET: 8.6; 5 TABLET, FILM COATED ORAL at 09:12

## 2021-05-25 RX ADMIN — FAMOTIDINE 20 MG: 20 TABLET, FILM COATED ORAL at 09:11

## 2021-05-25 RX ADMIN — POTASSIUM BICARBONATE 40 MEQ: 782 TABLET, EFFERVESCENT ORAL at 09:12

## 2021-05-25 RX ADMIN — DOXAZOSIN 8 MG: 2 TABLET ORAL at 09:11

## 2021-05-25 RX ADMIN — LISINOPRIL 40 MG: 20 TABLET ORAL at 09:11

## 2021-05-25 RX ADMIN — AMLODIPINE BESYLATE 10 MG: 5 TABLET ORAL at 09:11

## 2021-05-25 NOTE — PROGRESS NOTES
5/25/2021  4:47 PM  Transition of care plan:  RUR 9 % Low Risk of readmission  LOS 8 Days  1. Awaiting medical clearance and DC order, surgery following, ileus resolving, advance diet as tolerated   2. Home w/  HH with [facility]. 3. Outpatient follow-up.    4. Pt's family to transport  NICHOLAS Groves

## 2021-05-25 NOTE — PROGRESS NOTES
CMS Note  5/25/2021    Patient received and signed their 2nd IMM letter. Patient was provided with a copy for their record.   Leatha Marrero CMS

## 2021-05-25 NOTE — PROGRESS NOTES
Bedside shift change report given to José Miguel Black (oncoming nurse) by JODIE ABREU (offgoing nurse). Report included the following information SBAR.

## 2021-05-25 NOTE — PROGRESS NOTES
General Surgery Daily Progress Note    Patient: Ranjit Hu MRN: [id number]  SSN: [id number]    YOB: 1950  Age: 69 y.o.   Sex: male      Admit Date: 5/17/2021    POD 8 Days Post-Op    Procedure: Procedure(s):  L3-L5 LAMINECTOMY, FUSION, INSTRUMENTATION, TLIF, BONE GRAFT    Subjective:   Reports less bloated  Passed some flatus and had small BM  Had a small episode of vomiting immediately after taking in oral potassium, otherwise no issues    Current Facility-Administered Medications   Medication Dose Route Frequency    potassium bicarb-citric acid (EFFER-K) tablet 40 mEq  40 mEq Oral BID    0.9% sodium chloride infusion  50 mL/hr IntraVENous CONTINUOUS    doxazosin (CARDURA) tablet 8 mg  8 mg Oral DAILY    lisinopriL (PRINIVIL, ZESTRIL) tablet 40 mg  40 mg Oral DAILY    finasteride (PROSCAR) tablet 5 mg  5 mg Oral DAILY    amLODIPine (NORVASC) tablet 10 mg  10 mg Oral DAILY    allopurinoL (ZYLOPRIM) tablet 600 mg  600 mg Oral DAILY    spironolactone (ALDACTONE) tablet 25 mg  25 mg Oral DAILY    cholecalciferol (VITAMIN D3) (5000 Units/125 mcg) tablet 10,000 Units  10,000 Units Oral DAILY    benzocaine-menthoL (CEPACOL) lozenge 1 Lozenge  1 Lozenge Mucous Membrane PRN    sodium chloride (NS) flush 5-40 mL  5-40 mL IntraVENous Q8H    sodium chloride (NS) flush 5-40 mL  5-40 mL IntraVENous PRN    naloxone (NARCAN) injection 0.4 mg  0.4 mg IntraVENous PRN    senna-docusate (PERICOLACE) 8.6-50 mg per tablet 1 Tab  1 Tablet Oral BID    bisacodyL (DULCOLAX) suppository 10 mg  10 mg Rectal DAILY PRN    acetaminophen (TYLENOL) tablet 650 mg  650 mg Oral Q6H PRN    oxyCODONE IR (ROXICODONE) tablet 5 mg  5 mg Oral Q3H PRN    oxyCODONE IR (ROXICODONE) tablet 10 mg  10 mg Oral Q3H PRN    famotidine (PEPCID) tablet 20 mg  20 mg Oral BID    diphenhydrAMINE (BENADRYL) injection 12.5 mg  12.5 mg IntraVENous Q6H PRN    cyclobenzaprine (FLEXERIL) tablet 10 mg  10 mg Oral TID PRN    butalbital-acetaminophen-caffeine (FIORICET, ESGIC) -40 mg per tablet 1 Tab  1 Tablet Oral Q6H PRN    butalbital-acetaminophen-caffeine (FIORICET, ESGIC) -40 mg per tablet 2 Tab  2 Tablet Oral Q6H PRN        Objective:   05/25 0701 - 05/25 1900  In: 450 [P.O.:450]  Out: -   05/23 1901 - 05/25 0700  In: 1633.8 [P.O.:300;  I.V.:1333.8]  Out: 970 [Urine:400; Drains:570]  Patient Vitals for the past 8 hrs:   BP Temp Pulse Resp SpO2   05/25/21 0728 (!) 156/77 98.3 °F (36.8 °C) 66 16 94 %   05/25/21 0328 (!) 163/79 97.9 °F (36.6 °C) 76 15 93 %       Physical Exam:  General: Alert, cooperative, NAD  Lungs: Unlabored  Abdomen: Soft, non-tender, slightly distended  Extremities: Warm, moves all, no edema  Skin:  Warm and dry, no rash    Labs:   Recent Labs     05/25/21  0219 05/24/21  0344   WBC  --  5.9   HGB 8.0* 7.8*  7.6*   HCT  --  24.4*   PLT  --  270     Recent Labs     05/25/21  0219      K 3.7      CO2 28   GLU 96   BUN 5*   CREA 0.83   CA 8.4*   MG 2.3       Assessment / Plan:     POD 8 Days Post-Op    Procedure: Procedure(s):  L3-L5 LAMINECTOMY, FUSION, INSTRUMENTATION, TLIF, BONE GRAFT    Continue fulls and advance diet as tolerated  Ileus resolving  No surgical intervention for ileus       Ej Platt MD

## 2021-05-25 NOTE — PROGRESS NOTES
Physical Therapy  5/25/2021    Chart reviewed and spoke with RN. Pt remains hospitalized d/t hemovac output and post-op ileus. Pt has been cleared from a mobility standpoint for several days. Up ad anna in room and walking with mobility team in hallway. Pt is cleared to walk in hallway independently. Encouraged at least 3 walks in hallway per day and pt in agreement. No additional acute therapy needs. Will sign off as pt has met all therapy goals.     Thank Viola Uribe, PT, DPT

## 2021-05-25 NOTE — PROGRESS NOTES
ORTHOPAEDIC LUMBAR FUSION PROGRESS NOTE    NAME:     Alejandro Guerra   :       1950   MRN:       [id number]   DATE:      2021    POD:    8 Days Post-Op  S/P:    Procedure(s):  L3-L5 LAMINECTOMY, FUSION, INSTRUMENTATION, TLIF, BONE GRAFT    SUBJECTIVE:    C/O back pain along surgical incision  No leg pain or numbness  Denies nausea/vomiting, abd pain, photophobia, positional headache, chest pain or shortness of breath  Pain controlled    Recent Labs     21  0219 21  0344   HGB 8.0* 7.8*  7.6*   HCT  --  24.4*    140   K 3.7 3.2*    109*   CO2 28 25   BUN 5* 6   CREA 0.83 0.82   GLU 96 106*     Patient Vitals for the past 12 hrs:   BP Temp Pulse Resp SpO2   21 0728 (!) 156/77 98.3 °F (36.8 °C) 66 16 94 %   21 0328 (!) 163/79 97.9 °F (36.6 °C) 76 15 93 %   21 0001 (!) 146/90 97.9 °F (36.6 °C) 77 16 94 %       EXAM:  Dressings clean, dry and intact   Positive strength/ROM bilat lower ext. Neuro intact to sensation  Calves, soft & nontender  BL LEs NVID    Abdomen: Distension improved. Soft. +BS.  No tenderness.     PLAN:  On full liquid diet- tolerating  Hemovac drain to gravity  Monitor hemovac output  Continue prn PO pain medications- try to limit due to suspected ileus  Mobilize patient      Wellington Barragan [place]  Orthopaedic Surgery  Physician Assistant to Dr. Abi Esparza

## 2021-05-25 NOTE — PROGRESS NOTES
Problem: Falls - Risk of  Goal: *Absence of Falls  Description: Document Fely Conner Fall Risk and appropriate interventions in the flowsheet. Outcome: Progressing Towards Goal  Note: Fall Risk Interventions:  Mobility Interventions: Patient to call before getting OOB         Medication Interventions: Teach patient to arise slowly    Elimination Interventions: Call light in reach              Problem: Pressure Injury - Risk of  Goal: *Prevention of pressure injury  Description: Document Tyrone Scale and appropriate interventions in the flowsheet. Outcome: Progressing Towards Goal  Note: Pressure Injury Interventions:             Activity Interventions: Increase time out of bed    Mobility Interventions: Pressure redistribution bed/mattress (bed type)    Nutrition Interventions: Document food/fluid/supplement intake, Offer support with meals,snacks and hydration    Friction and Shear Interventions: Minimize layers

## 2021-05-25 NOTE — PROGRESS NOTES
Patient is very ambulatory in room and does frequent position changes. Patient is encouraged to ambulate outside in the hallway.

## 2021-05-26 VITALS
DIASTOLIC BLOOD PRESSURE: 78 MMHG | RESPIRATION RATE: 16 BRPM | SYSTOLIC BLOOD PRESSURE: 157 MMHG | OXYGEN SATURATION: 97 % | HEIGHT: 67 IN | BODY MASS INDEX: 28.09 KG/M2 | HEART RATE: 86 BPM | TEMPERATURE: 98.1 F | WEIGHT: 179 LBS

## 2021-05-26 LAB
ANION GAP SERPL CALC-SCNC: 6 MMOL/L (ref 5–15)
BUN SERPL-MCNC: 6 MG/DL (ref 6–20)
BUN/CREAT SERPL: 6 (ref 12–20)
CALCIUM SERPL-MCNC: 8.6 MG/DL (ref 8.5–10.1)
CHLORIDE SERPL-SCNC: 104 MMOL/L (ref 97–108)
CO2 SERPL-SCNC: 27 MMOL/L (ref 21–32)
CREAT SERPL-MCNC: 0.93 MG/DL (ref 0.7–1.3)
GLUCOSE SERPL-MCNC: 103 MG/DL (ref 65–100)
HGB BLD-MCNC: 8.2 G/DL (ref 12.1–17)
POTASSIUM SERPL-SCNC: 3.5 MMOL/L (ref 3.5–5.1)
SODIUM SERPL-SCNC: 137 MMOL/L (ref 136–145)

## 2021-05-26 PROCEDURE — 85018 HEMOGLOBIN: CPT

## 2021-05-26 PROCEDURE — 74011250637 HC RX REV CODE- 250/637: Performed by: ORTHOPAEDIC SURGERY

## 2021-05-26 PROCEDURE — 36415 COLL VENOUS BLD VENIPUNCTURE: CPT

## 2021-05-26 PROCEDURE — 80048 BASIC METABOLIC PNL TOTAL CA: CPT

## 2021-05-26 PROCEDURE — 94760 N-INVAS EAR/PLS OXIMETRY 1: CPT

## 2021-05-26 RX ORDER — DOCUSATE SODIUM 100 MG/1
100 CAPSULE, LIQUID FILLED ORAL 2 TIMES DAILY
Qty: 60 CAPSULE | Refills: 0 | Status: SHIPPED | OUTPATIENT
Start: 2021-05-26 | End: 2021-09-16

## 2021-05-26 RX ORDER — OXYCODONE HYDROCHLORIDE 5 MG/1
5-10 TABLET ORAL
Qty: 50 TABLET | Refills: 0 | Status: SHIPPED | OUTPATIENT
Start: 2021-05-26 | End: 2021-06-02

## 2021-05-26 RX ADMIN — FAMOTIDINE 20 MG: 20 TABLET, FILM COATED ORAL at 08:54

## 2021-05-26 RX ADMIN — SPIRONOLACTONE 25 MG: 25 TABLET ORAL at 08:54

## 2021-05-26 RX ADMIN — DOXAZOSIN 8 MG: 2 TABLET ORAL at 08:54

## 2021-05-26 RX ADMIN — ALLOPURINOL 600 MG: 300 TABLET ORAL at 08:55

## 2021-05-26 RX ADMIN — AMLODIPINE BESYLATE 10 MG: 5 TABLET ORAL at 08:54

## 2021-05-26 RX ADMIN — DOCUSATE SODIUM 50MG AND SENNOSIDES 8.6MG 1 TABLET: 8.6; 5 TABLET, FILM COATED ORAL at 08:54

## 2021-05-26 RX ADMIN — FINASTERIDE 5 MG: 5 TABLET, FILM COATED ORAL at 08:55

## 2021-05-26 RX ADMIN — LISINOPRIL 40 MG: 20 TABLET ORAL at 08:54

## 2021-05-26 RX ADMIN — Medication 10000 UNITS: at 08:54

## 2021-05-26 NOTE — PROGRESS NOTES
General Surgery Daily Progress Note    Patient: Alexis Monroe MRN: [id number]  SSN: [id number]    YOB: 1950  Age: 69 y.o.   Sex: male      Admit Date: 5/17/2021    POD 9 Days Post-Op    Procedure: Procedure(s):  L3-L5 LAMINECTOMY, FUSION, INSTRUMENTATION, TLIF, BONE GRAFT    Subjective:   Denies abdominal pain  Alayna Fulls  Had flatus and BM this AM  No N/V    Current Facility-Administered Medications   Medication Dose Route Frequency    0.9% sodium chloride infusion  50 mL/hr IntraVENous CONTINUOUS    doxazosin (CARDURA) tablet 8 mg  8 mg Oral DAILY    lisinopriL (PRINIVIL, ZESTRIL) tablet 40 mg  40 mg Oral DAILY    finasteride (PROSCAR) tablet 5 mg  5 mg Oral DAILY    amLODIPine (NORVASC) tablet 10 mg  10 mg Oral DAILY    allopurinoL (ZYLOPRIM) tablet 600 mg  600 mg Oral DAILY    spironolactone (ALDACTONE) tablet 25 mg  25 mg Oral DAILY    cholecalciferol (VITAMIN D3) (5000 Units/125 mcg) tablet 10,000 Units  10,000 Units Oral DAILY    benzocaine-menthoL (CEPACOL) lozenge 1 Lozenge  1 Lozenge Mucous Membrane PRN    sodium chloride (NS) flush 5-40 mL  5-40 mL IntraVENous Q8H    sodium chloride (NS) flush 5-40 mL  5-40 mL IntraVENous PRN    naloxone (NARCAN) injection 0.4 mg  0.4 mg IntraVENous PRN    senna-docusate (PERICOLACE) 8.6-50 mg per tablet 1 Tab  1 Tablet Oral BID    bisacodyL (DULCOLAX) suppository 10 mg  10 mg Rectal DAILY PRN    acetaminophen (TYLENOL) tablet 650 mg  650 mg Oral Q6H PRN    oxyCODONE IR (ROXICODONE) tablet 5 mg  5 mg Oral Q3H PRN    oxyCODONE IR (ROXICODONE) tablet 10 mg  10 mg Oral Q3H PRN    famotidine (PEPCID) tablet 20 mg  20 mg Oral BID    diphenhydrAMINE (BENADRYL) injection 12.5 mg  12.5 mg IntraVENous Q6H PRN    cyclobenzaprine (FLEXERIL) tablet 10 mg  10 mg Oral TID PRN    butalbital-acetaminophen-caffeine (FIORICET, ESGIC) -40 mg per tablet 1 Tab  1 Tablet Oral Q6H PRN    butalbital-acetaminophen-caffeine (FIORICET, ESGIC) -40 mg per tablet 2 Tab  2 Tablet Oral Q6H PRN        Objective:   No intake/output data recorded.   05/24 1901 - 05/26 0700  In: 800 [P.O.:800]  Out: 865 [Urine:275; Drains:590]  Patient Vitals for the past 8 hrs:   BP Temp Pulse Resp SpO2   05/26/21 0805 (!) 166/80 98 °F (36.7 °C) 91 17 98 %   05/26/21 0346 (!) 158/77 98.1 °F (36.7 °C) 63 16 96 %       Physical Exam:  General: Alert, cooperative, NAD  Lungs: Unlabored  Abdomen: Soft, NT, ND  Extremities: Warm, moves all, no edema  Skin:  Warm and dry, no rash    Labs:   Recent Labs     05/26/21 0224 05/24/21  0344   WBC  --  5.9   HGB 8.2* 7.8*  7.6*   HCT  --  24.4*   PLT  --  270     Recent Labs     05/26/21 0224 05/25/21  0219    141   K 3.5 3.7    107   CO2 27 28   * 96   BUN 6 5*   CREA 0.93 0.83   CA 8.6 8.4*   MG  --  2.3       Assessment / Plan:     POD 9 Days Post-Op    Procedure: Procedure(s):  L3-L5 LAMINECTOMY, FUSION, INSTRUMENTATION, TLIF, BONE GRAFT  Res;grace ileus  OK to be discharged from surgical standpoint        Cherise Munoz MD

## 2021-05-26 NOTE — DISCHARGE INSTRUCTIONS
Lumbar Spinal Surgery Discharge Instructions   Dr. Rah Bustamante  [phone]    Activity:     For the first few day at home, limit activity, lie flat often, and take it very easy. Your only exercise should be walking. After a couple days of rest at home, start with short frequent walks and increase your walking distance each day. Start with walking twice a day for 5 minutes and increase your distance each day 2-3 minutes until you reach 25 minutes twice a day. Limit the amount of time you sit to 20-30 minute intervals. Sitting for prolonged periods of time will be uncomfortable for you following your surgery.  No bending, lifting (of 5lbs or more), twisting, or straining.  Do not drive until your doctor states you may do so. However, you may ride in a car for short distances.  If you are required to wear a brace, you should wear it at all times when you are out of bed. You may remove it when sleeping unless your physician advises you against it.  When you are in the bed, you may lay on your back or on either side. Do not lie on your stomach.  You may resume sexual relations 3-4 weeks after surgery depending on how you are feeling.  Continue to use your incentive spirometer for deep breathing exercises. Driving:   You may not drive or return to work until instructed by your physician. However, you may ride in the car for short periods of time. Brace:   If you have a back brace, you should wear your brace at all times when you are out of bed. Do not wear the brace while in bed or showering.  Remember to always wear a cotton t-shirt underneath your brace.  Do not bend or twist when your brace is off. Diet:   You may resume your regular home diet as tolerated. If your throat is sore, you should eat soft foods for the first couple of days.  Be sure to drink plenty of fluids; it is important to keep yourself hydrated.     Avoid alcoholic beverages and ABSOLUTELY NO tobacco products. Tobacco products will interfere with your healing. If you continue to use tobacco, you may end up needing another surgery in the future. Dressing: You have on a Prineo dressing. This is a waterproof bacteriostatic dressing that  requires no post-operative care. Please do not peel the dressing off, or apply any  oil based products, as they may expedite the deterioration of the mesh. The  dressing will slowly chip off on its own.  Do not rub or apply lotion or ointments to incision site. Shower:   You may shower 5 days after your surgery.  You may remove your brace during showers.  NO not use tub baths, swimming pools or Jacuzzis. Medications:  **Your prescriptions have been e-scribed to the pharmacy on file at Dr. Kinjal Valdez office. **   Check with your physician before taking any anti-inflammatory medications. (Advil, Aleve, Ibuprofen, Aspirin)   Take your pain medication as directed   Do NOT take additional Tylenol if your prescribed pain medication has acetaminophen in it (Endocet/Percocet, Lortab, Norco)   It is important to have regular bowel movements. Pain medications can be constipating. Stool softeners, warm prune juice, increasing your water intake, and increasing fiber in your diet can help in preventing constipation.  Do NOT take laxatives if at all possible except in severe situations. It can result in a vicious cycle of constipation and diarrhea. *** VERY IMPORTANT - PLEASE READ*** Please monitor the supply of your pain medicine closely and if it looks like you're going to run out of pain medicine over the weekend please call the office on [address] prior to the weekend to request a refill. The on call physician for nights and weekends CANNOT refill pain medicine.   You will either have to wait until Monday morning when the office re-opens or you will have to go to an urgent care/ emergency room if you are in need of pain medicine. Follow-Up    Please call ASAP to schedule your 1st post-operative appointment. This should be approximately 3 weeks from your surgery date. Notify your physician in you develop any of the following conditions:   Fever above 101 degrees for 24 hours.  Nausea or vomiting.  Severe headache.  Inability to urinate   Loss of bowel or bladder function (sudden onset of incontinence)   Changes in sensation in your extremities (numbness, tingling, loss of color).  Severe pain or pain not relieved by medications.  Redness, swelling, or drainage from your incision.  Persistent pain in the chest.    Pain in the calf of either leg.  Increased weakness (if this is greater than before your surgery). If you have any questions about your dressing contact your Orthopaedic Surgeons office. OFFICE OF DR. Breanna Bains   [phone]  OUR NEW ADDRESS IS [address], [facility] [address] [facility]     ** IMPORTANT: UNDER YOUR HONEYCOMB DRESSING, YOU HAVE A PRINEO ADHESIVE MESH DIRECTLY OVER YOUR INCISION. THIS MESH WAS STERILELY GLUED TO YOUR SKIN DURING SURGERY. DO NOT REMOVE THIS. NO ONE ELSE SHOULD REMOVE IT EITHER. IT WILL COME OFF ON ITS OWN OVER TIME    YOUR HONEYCOMB DRESSING NEEDS TO BE REMOVED PRIOR TO SHOWERING. THEN THE PRINEO MESH CAN BE LEFT OPEN TO AIR    * WEAR YOUR BRACE AS ADVISED    * NO DRIVING UNTIL YOU ARE CLEARED TO DO SO BY YOUR SURGEON    * LIMIT LIFTING, BENDING AND TWISTING.  NO LIFTING MORE THAN 5 LBS    * MAKE SURE YOU ARE GETTING GOOD NUTRITION (Lean Protein, Vitamin D AND Calcium)    * DO NOT TAKE ANY NSAIDs FOR THE FIRST 3 MONTHS AFTER SURGERY (such as Advil/Ibuprofen/Motrin, Aleve/Naproxen/Naprosyn, Diclofenac, Celebrex, Meloxicam, Indomethacin, Goody's powder, BC powder etc.)    * NO NICOTINE PRODUCTS    * FULLY READ YOUR DISCHARGE INSTRUCTIONS

## 2021-05-26 NOTE — PROGRESS NOTES
05/26/21 5:01 PM  Patient discharged out of system. Order for RW in after patient discharged. Spoke with CM Manager--concern that insurance will not pay due to patient already being discharged. CM dept will pay for cost of RW. Patient signed delivery slip, slip uploaded to [employer] with message noting that CM dept will pay for RW.   COURTNEY Tomas

## 2021-05-26 NOTE — PROGRESS NOTES
Ortho Spine F/u      Rounded with Dr. Francheska Wyatt this morning. He informed patient that he will be discharged home today. Full progress note by Aminta Landau, PA to follow. HV output at 2pm was 130mL, serous appearance. Pt denies HV, N/V, or photophobia. Discussed with Dr. Francheska Wyatt. He states may remove HV drain and d/c patient home. Pt is to limit activity, lie flat often, and take it very easy the first few days at home. Discussed with patient and wife. Will place d/c orders.       Zaki Abebe NP

## 2021-05-26 NOTE — PROGRESS NOTES
Bedside and Verbal shift change report given to Donny (oncoming nurse) by Leela [place] (offgoing nurse). Report included the following information SBAR, Kardex, Intake/Output and MAR.

## 2021-05-26 NOTE — PROGRESS NOTES
5/26/2021  4:13 PM  DC order noted, pt stable for DC, surgery signed off OK for DC today as pt tolerated diet; ortho plan to pull drain and DC  CM met w/ pt Dtr is here to transport, CM gave pt information on [facility], added to AVS  AVS sent to [facility] in [employer]  Pt is ready for DC from CM standpoint  Care Management Interventions  PCP Verified by CM:  Yes  Transition of Care Consult (CM Consult): [address]: No  Reason Outside [place]: Out of service area  Current Support Network: Lives Alone  Confirm Follow Up Transport: Family  The Plan for Transition of Care is Related to the Following Treatment Goals : Spondylolisthesis, lumbar region spinal stenosis of lumbar region with neurogenic claudication  Discharge Location  Discharge Placement: Home with home health  NICHOLAS Ricardo

## 2021-05-26 NOTE — PROGRESS NOTES
Patient educated to limit activity at home, lie flat often, and take it easy first few days at home. Educated to call the office if any symptoms such as headache, photophobia, N/V. Patient agreeable and understands education. Discharge medications reviewed with patient and appropriate educational materials and side effects teaching were provided. I have reviewed discharge instructions with the patient. The patient verbalized understanding. Current Discharge Medication List      START taking these medications    Details   oxyCODONE IR (ROXICODONE) 5 mg immediate release tablet Take 1-2 Tablets by mouth every six (6) hours as needed for Pain for up to 7 days. Max Daily Amount: 40 mg. Indications: pain  Qty: 50 Tablet, Refills: 0  Start date: 5/26/2021, End date: 6/2/2021    Comments: S/p Orthopaedic Surgery. Call [phone] with questions. Associated Diagnoses: Post-op pain      docusate sodium (COLACE) 100 mg capsule Take 1 Capsule by mouth two (2) times a day. Qty: 60 Capsule, Refills: 0  Start date: 5/26/2021         CONTINUE these medications which have NOT CHANGED    Details   amLODIPine (Norvasc) 10 mg tablet Take 10 mg by mouth daily. spironolactone (ALDACTONE) 25 mg tablet Take 25 mg by mouth daily. doxazosin (CARDURA) 8 mg tablet Take 8 mg by mouth daily. cyclobenzaprine (FLEXERIL) 5 mg tablet Take 5 mg by mouth three (3) times daily as needed for Muscle Spasm(s). finasteride (PROSCAR) 5 mg tablet Take 5 mg by mouth daily. lisinopriL (PRINIVIL, ZESTRIL) 40 mg tablet Take 40 mg by mouth daily. vitamin E mixed/tocotrienol (VITAMIN E COMPLEX PO) Take 184 mg by mouth daily. garlic 2,254 mg cap Take 1 Tab by mouth daily. multivitamin (Men's Multi-Vitamin) tablet Take 1 Tab by mouth daily. omega 3-dha-epa-fish oil (Fish Oil) 100-160-1,000 mg cap Take 1 Cap by mouth daily.       cholecalciferol (Vitamin D3) (5000 Units/125 mcg) tab tablet Take 2 Tabs by mouth daily for 30 days. Indications: low vitamin D levels  Qty: 60 Tab, Refills: 0      allopurinoL (ZYLOPRIM) 300 mg tablet Take 600 mg by mouth daily.          STOP taking these medications       naproxen sodium (Aleve) 220 mg cap Comments:   Reason for Stopping:

## 2021-05-26 NOTE — PROGRESS NOTES
Shift Change:    Bedside and Verbal shift change report given to Donny RN (oncoming nurse) by Bravo Lea RN (offgoing nurse). Report included the following information SBAR, Kardex, Procedure Summary, Intake/Output, MAR and Recent Results. 1445: Drain output 130 mL since 0600.  Drain has remained uncharged per Dr. Jake Viramontes

## 2021-06-01 NOTE — DISCHARGE SUMMARY
DISCHARGE SUMMARY     Patient: Cynthia Wilson                             Medical Record Number: [id number]                : 1950  Age: 69 y.o. Admit Date: 2021  Discharge Date: 2021  Admission Diagnosis: Lumbar stenosis with neurogenic claudication [M48.062]  Discharge Diagnosis: SPONDYLOLISTHESIS, LUMBAR REGION  SPINAL STENOSIS OF LUMBAR REGION WITH NEUROGENIC CLAUDICATION  Procedures: Procedure(s):  L3-L5 LAMINECTOMY, FUSION, INSTRUMENTATION, TLIF, BONE GRAFT  Surgeon: Calderon Herndon MD  Co-surgeon:   Assistants:   Anesthesia: General  Complications: None     History of Present Illness:  Cynthia Wilson is a 69 y.o. male with a history of intractable low back and radiculopathy. Despite conservative management and after clinical and radiographic evaluation, it was determined that he suffered from lumbar stenosis  and lumbar spondylolisthesis and would benefit from Procedure(s):  L3-L5 LAMINECTOMY, FUSION, INSTRUMENTATION, TLIF, BONE GRAFT, which he consented to undergo after a discussion of the risks, benefits, alternatives, rehab concerns, and potential complications of surgery. Hospital Course:  Cynthia Wilson tolerated the procedure well. He was transferred  to the recovery room in stable condition. After a brief stay, the patient was then transferred to the Orthopedic floor. On postoperative day #1, the dressing was clean and dry and he was neurovascularly intact. The patient was afebrile and vital signs were stable. Calves were soft and non-tender bilaterally. Cynthia Wilson was kept flat overnight after surgery. The following morning, he had no symptoms of a CSF leak. He was cleared to start PT. On post-operative day 3, patient developed abdominal fullness and distension. His post-operative ileus was treated with a bowel regimen and subcutaneous relistor. His diet was initially changed to sips of clears. General surgery was consulted. His abdominal distension resolved with having bowel movements. His diet was advanced. He tolerated a full liquid diet prior to discharge without problem. He was cleared for discharge by General Surgery. Alexis Monroe made excellent progress with physical therapy and was discharged to Home with Home Health in stable condition on postoperative day 9. He was provided with routine postoperative instructions and advised to follow up in my office in 2-3 weeks following discharge from the hospital.  He was given prescriptions for medication to control post-operative symptoms. Discharge Medications:  Discharge Medication List as of 5/26/2021  4:23 PM      START taking these medications    Details   oxyCODONE IR (ROXICODONE) 5 mg immediate release tablet Take 1-2 Tablets by mouth every six (6) hours as needed for Pain for up to 7 days. Max Daily Amount: 40 mg. Indications: pain, Normal, Disp-50 Tablet, R-0S/p Orthopaedic Surgery. Call [phone] with questions. docusate sodium (COLACE) 100 mg capsule Take 1 Capsule by mouth two (2) times a day., Normal, Disp-60 Capsule, R-0         CONTINUE these medications which have NOT CHANGED    Details   amLODIPine (Norvasc) 10 mg tablet Take 10 mg by mouth daily. , Historical Med      spironolactone (ALDACTONE) 25 mg tablet Take 25 mg by mouth daily. , Historical Med      doxazosin (CARDURA) 8 mg tablet Take 8 mg by mouth daily. , Historical Med      cyclobenzaprine (FLEXERIL) 5 mg tablet Take 5 mg by mouth three (3) times daily as needed for Muscle Spasm(s). , Historical Med      finasteride (PROSCAR) 5 mg tablet Take 5 mg by mouth daily. , Historical Med      lisinopriL (PRINIVIL, ZESTRIL) 40 mg tablet Take 40 mg by mouth daily. , Historical Med      vitamin E mixed/tocotrienol (VITAMIN E COMPLEX PO) Take 184 mg by mouth daily. , Historical Med      garlic 6,662 mg cap Take 1 Tab by mouth daily. , Historical Med      multivitamin (Men's Multi-Vitamin) tablet Take 1 Tab by mouth daily. , Historical Med      omega 3-dha-epa-fish oil (Fish Oil) 100-160-1,000 mg cap Take 1 Cap by mouth daily. , Historical Med      cholecalciferol (Vitamin D3) (5000 Units/125 mcg) tab tablet Take 2 Tabs by mouth daily for 30 days. Indications: low vitamin D levels, Normal, Disp-60 Tab, R-0      allopurinoL (ZYLOPRIM) 300 mg tablet Take 600 mg by mouth daily. , Historical Med         STOP taking these medications       naproxen sodium (Aleve) 220 mg cap Comments:   Reason for Stopping:               PILY Monson  5/26/2021  Orthopaedic Surgery  Physician Assistant to Dr. Gordon Haney

## 2021-06-01 NOTE — PROGRESS NOTES
ORTHOPAEDIC LUMBAR FUSION PROGRESS NOTE    NAME:     Kisha Serrato   :       1950   MRN:       [id number]   DATE:      2021    POD:    9 Days Post-Op  S/P:    Procedure(s):  L3-L5 LAMINECTOMY, FUSION, INSTRUMENTATION, TLIF, BONE GRAFT    SUBJECTIVE:    C/O back pain along surgical incision  No leg pain or numbness  Denies nausea/vomiting, abd pain, photophobia, positional headache, chest pain or shortness of breath  Pain controlled    Vitals and labs reviewed    EXAM:  Dressings clean, dry and intact   Positive strength/ROM bilat lower ext. Neuro intact to sensation  Calves, soft & nontender  BL LEs NVID    Abdomen: Distension resolved. Soft. +BS.  No tenderness.     PLAN:  Tolerating full liquid diet  Monitor hemovac output, drain to gravity  Continue prn PO pain medications- limit as much as possible  PT/OT, OOB w/ assist  Mobilize patient      Barbara Spear [place]  Orthopaedic Surgery  Physician Assistant to Dr. Jeannette Mitchell

## 2021-08-18 ENCOUNTER — HOSPITAL ENCOUNTER (EMERGENCY)
Age: 71
Discharge: HOME | End: 2021-08-18
Attending: EMERGENCY MEDICINE
Payer: MEDICARE

## 2021-08-18 ENCOUNTER — APPOINTMENT (OUTPATIENT)
Dept: CT IMAGING | Age: 71
End: 2021-08-18
Attending: EMERGENCY MEDICINE
Payer: MEDICARE

## 2021-08-18 VITALS
TEMPERATURE: 98.8 F | SYSTOLIC BLOOD PRESSURE: 158 MMHG | HEART RATE: 55 BPM | BODY MASS INDEX: 27.47 KG/M2 | OXYGEN SATURATION: 98 % | DIASTOLIC BLOOD PRESSURE: 72 MMHG | HEIGHT: 67 IN | WEIGHT: 175 LBS | RESPIRATION RATE: 16 BRPM

## 2021-08-18 DIAGNOSIS — R10.31 ABDOMINAL PAIN, RIGHT LOWER QUADRANT: Primary | ICD-10-CM

## 2021-08-18 LAB
ALBUMIN SERPL-MCNC: 3.5 G/DL (ref 3.5–5)
ALBUMIN/GLOB SERPL: 0.9 {RATIO} (ref 1.1–2.2)
ALP SERPL-CCNC: 57 U/L (ref 45–117)
ALT SERPL-CCNC: 33 U/L (ref 12–78)
ANION GAP SERPL CALC-SCNC: 12 MMOL/L (ref 5–15)
APPEARANCE UR: CLEAR
AST SERPL-CCNC: 17 U/L (ref 15–37)
ATRIAL RATE: 55 BPM
BACTERIA URNS QL MICRO: NEGATIVE /HPF
BASOPHILS # BLD: 0 K/UL (ref 0–0.1)
BASOPHILS NFR BLD: 0 % (ref 0–1)
BILIRUB SERPL-MCNC: 0.3 MG/DL (ref 0.2–1)
BILIRUB UR QL: NEGATIVE
BUN SERPL-MCNC: 18 MG/DL (ref 6–20)
BUN/CREAT SERPL: 16 (ref 12–20)
CALCIUM SERPL-MCNC: 8.8 MG/DL (ref 8.5–10.1)
CALCULATED P AXIS, ECG09: 60 DEGREES
CALCULATED R AXIS, ECG10: 30 DEGREES
CALCULATED T AXIS, ECG11: 53 DEGREES
CHLORIDE SERPL-SCNC: 101 MMOL/L (ref 97–108)
CO2 SERPL-SCNC: 24 MMOL/L (ref 21–32)
COLOR UR: ABNORMAL
COMMENT, HOLDF: NORMAL
COMMENT, HOLDF: NORMAL
CREAT SERPL-MCNC: 1.16 MG/DL (ref 0.7–1.3)
DIAGNOSIS, 93000: NORMAL
DIFFERENTIAL METHOD BLD: ABNORMAL
EOSINOPHIL # BLD: 0 K/UL (ref 0–0.4)
EOSINOPHIL NFR BLD: 0 % (ref 0–7)
EPITH CASTS URNS QL MICRO: ABNORMAL /LPF
ERYTHROCYTE [DISTWIDTH] IN BLOOD BY AUTOMATED COUNT: 17 % (ref 11.5–14.5)
GLOBULIN SER CALC-MCNC: 3.7 G/DL (ref 2–4)
GLUCOSE SERPL-MCNC: 162 MG/DL (ref 65–100)
GLUCOSE UR STRIP.AUTO-MCNC: NEGATIVE MG/DL
HCT VFR BLD AUTO: 30.4 % (ref 36.6–50.3)
HGB BLD-MCNC: 10 G/DL (ref 12.1–17)
HGB UR QL STRIP: NEGATIVE
IMM GRANULOCYTES # BLD AUTO: 0.1 K/UL (ref 0–0.04)
IMM GRANULOCYTES NFR BLD AUTO: 1 % (ref 0–0.5)
KETONES UR QL STRIP.AUTO: NEGATIVE MG/DL
LEUKOCYTE ESTERASE UR QL STRIP.AUTO: NEGATIVE
LIPASE SERPL-CCNC: 81 U/L (ref 73–393)
LYMPHOCYTES # BLD: 0.8 K/UL (ref 0.8–3.5)
LYMPHOCYTES NFR BLD: 12 % (ref 12–49)
MCH RBC QN AUTO: 29.7 PG (ref 26–34)
MCHC RBC AUTO-ENTMCNC: 32.9 G/DL (ref 30–36.5)
MCV RBC AUTO: 90.2 FL (ref 80–99)
MONOCYTES # BLD: 0.2 K/UL (ref 0–1)
MONOCYTES NFR BLD: 3 % (ref 5–13)
NEUTS SEG # BLD: 5.2 K/UL (ref 1.8–8)
NEUTS SEG NFR BLD: 84 % (ref 32–75)
NITRITE UR QL STRIP.AUTO: NEGATIVE
NRBC # BLD: 0 K/UL (ref 0–0.01)
NRBC BLD-RTO: 0 PER 100 WBC
P-R INTERVAL, ECG05: 150 MS
PH UR STRIP: 6 [PH] (ref 5–8)
PLATELET # BLD AUTO: 310 K/UL (ref 150–400)
PMV BLD AUTO: 11.4 FL (ref 8.9–12.9)
POTASSIUM SERPL-SCNC: 4.3 MMOL/L (ref 3.5–5.1)
PROT SERPL-MCNC: 7.2 G/DL (ref 6.4–8.2)
PROT UR STRIP-MCNC: 30 MG/DL
Q-T INTERVAL, ECG07: 458 MS
QRS DURATION, ECG06: 76 MS
QTC CALCULATION (BEZET), ECG08: 438 MS
RBC # BLD AUTO: 3.37 M/UL (ref 4.1–5.7)
RBC #/AREA URNS HPF: ABNORMAL /HPF (ref 0–5)
RBC MORPH BLD: ABNORMAL
SAMPLES BEING HELD,HOLD: NORMAL
SAMPLES BEING HELD,HOLD: NORMAL
SODIUM SERPL-SCNC: 137 MMOL/L (ref 136–145)
SP GR UR REFRACTOMETRY: 1.02 (ref 1–1.03)
UR CULT HOLD, URHOLD: NORMAL
UROBILINOGEN UR QL STRIP.AUTO: 0.2 EU/DL (ref 0.2–1)
VENTRICULAR RATE, ECG03: 55 BPM
WBC # BLD AUTO: 6.3 K/UL (ref 4.1–11.1)
WBC URNS QL MICRO: ABNORMAL /HPF (ref 0–4)

## 2021-08-18 PROCEDURE — 96375 TX/PRO/DX INJ NEW DRUG ADDON: CPT

## 2021-08-18 PROCEDURE — 77030018842 HC SOL IRR SOD CL 9% BAXT -A

## 2021-08-18 PROCEDURE — 81001 URINALYSIS AUTO W/SCOPE: CPT

## 2021-08-18 PROCEDURE — 83690 ASSAY OF LIPASE: CPT

## 2021-08-18 PROCEDURE — 74176 CT ABD & PELVIS W/O CONTRAST: CPT

## 2021-08-18 PROCEDURE — 93005 ELECTROCARDIOGRAM TRACING: CPT

## 2021-08-18 PROCEDURE — 85025 COMPLETE CBC W/AUTO DIFF WBC: CPT

## 2021-08-18 PROCEDURE — 96374 THER/PROPH/DIAG INJ IV PUSH: CPT

## 2021-08-18 PROCEDURE — 99285 EMERGENCY DEPT VISIT HI MDM: CPT

## 2021-08-18 PROCEDURE — 80053 COMPREHEN METABOLIC PANEL: CPT

## 2021-08-18 PROCEDURE — 74011250636 HC RX REV CODE- 250/636: Performed by: EMERGENCY MEDICINE

## 2021-08-18 PROCEDURE — 36415 COLL VENOUS BLD VENIPUNCTURE: CPT

## 2021-08-18 PROCEDURE — 2709999900 HC NON-CHARGEABLE SUPPLY

## 2021-08-18 RX ORDER — MORPHINE SULFATE 4 MG/ML
4 INJECTION INTRAVENOUS ONCE
Status: COMPLETED | OUTPATIENT
Start: 2021-08-18 | End: 2021-08-18

## 2021-08-18 RX ORDER — ONDANSETRON 8 MG/1
8 TABLET, ORALLY DISINTEGRATING ORAL
Qty: 12 TABLET | Refills: 0 | Status: SHIPPED | OUTPATIENT
Start: 2021-08-18 | End: 2021-09-08

## 2021-08-18 RX ORDER — LOSARTAN POTASSIUM 100 MG/1
100 TABLET ORAL DAILY
COMMUNITY

## 2021-08-18 RX ORDER — ONDANSETRON 2 MG/ML
4 INJECTION INTRAMUSCULAR; INTRAVENOUS
Status: COMPLETED | OUTPATIENT
Start: 2021-08-18 | End: 2021-08-18

## 2021-08-18 RX ORDER — DICYCLOMINE HYDROCHLORIDE 20 MG/1
20 TABLET ORAL EVERY 6 HOURS
Qty: 28 TABLET | Refills: 0 | Status: SHIPPED | OUTPATIENT
Start: 2021-08-18 | End: 2021-08-25

## 2021-08-18 RX ADMIN — SODIUM CHLORIDE 1000 ML: 9 INJECTION, SOLUTION INTRAVENOUS at 14:01

## 2021-08-18 RX ADMIN — ONDANSETRON 4 MG: 2 INJECTION INTRAMUSCULAR; INTRAVENOUS at 14:01

## 2021-08-18 RX ADMIN — MORPHINE SULFATE 4 MG: 4 INJECTION INTRAVENOUS at 14:01

## 2021-08-18 NOTE — ED NOTES
Pt resting on stretcher, vitals stable, no acute distress, speaking in clear sentences, c/o 7/10 LLQ pain, medicated per mar.  No other complaints at this time, call bell in reach, family at bedside

## 2021-08-18 NOTE — ED PROVIDER NOTES
78-year-old male presents with abdominal pain. He localizes the pain is diffuse. Describes as an ache. Rates a 7 out of 10. No modifying factors. He has had associated urinary urgency. He has been urinating small amounts. He also had associated nausea and vomiting x3 this morning. He is never had these symptoms before. Abdominal Pain   Associated symptoms include diarrhea and vomiting. Vomiting   Associated symptoms include abdominal pain and diarrhea. Diarrhea   Associated symptoms include diarrhea and vomiting. 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/06/2021    [place]       Past Surgical History:   Procedure Laterality Date    HX CATARACT REMOVAL Right     HX COLONOSCOPY           Family History:   Problem Relation Age of Onset    Anesth Problems Neg Hx     Clotting Disorder Neg Hx        Social History     Socioeconomic History    Marital status:      Spouse name: Not on file    Number of children: Not on file    Years of education: Not on file    Highest education level: Not on file   Occupational History    Not on file   Tobacco Use    Smoking status: Never Smoker    Smokeless tobacco: Never Used   Substance and Sexual Activity    Alcohol use: Yes     Comment: social    Drug use: Never    Sexual activity: Not on file   Other Topics Concern    Not on file   Social History Narrative    Not on file     Social Determinants of Health     Financial Resource Strain:     Difficulty of Paying Living Expenses:    Food Insecurity:     Worried About Running Out of Food in the Last Year:     Ran Out of Food in the Last Year:    Transportation Needs:     Lack of Transportation (Medical):      Lack of Transportation (Non-Medical):    Physical Activity:     Days of Exercise per Week:     Minutes of Exercise per Session:    Stress:     Feeling of Stress :    Social Connections:     Frequency of Communication with Friends and Family:     Frequency of Social Gatherings with Friends and Family:     Attends [employer] Services:     Active Member of Clubs or Organizations:     Attends Club or Organization Meetings:     Marital Status:    Intimate Partner Violence:     Fear of Current or Ex-Partner:     Emotionally Abused:     Physically Abused:     Sexually Abused: ALLERGIES: Patient has no known allergies. Review of Systems   Gastrointestinal: Positive for abdominal pain, diarrhea and vomiting. All other systems reviewed and are negative. Vitals:    08/18/21 1317 08/18/21 1339   BP: (!) 184/88 (!) 143/83   Pulse: (!) 55    Resp: 16    Temp: 98.8 °F (37.1 °C)    SpO2: 98% 98%   Weight: 79.4 kg (175 lb)    Height: 5' 7\" (1.702 m)             Physical Exam  Vitals and nursing note reviewed. Constitutional:       General: He is not in acute distress. HENT:      Head: Normocephalic and atraumatic. Eyes:      General: No scleral icterus. Conjunctiva/sclera: Conjunctivae normal.      Pupils: Pupils are equal, round, and reactive to light. Neck:      Trachea: No tracheal deviation. Cardiovascular:      Rate and Rhythm: Normal rate and regular rhythm. Pulmonary:      Effort: Pulmonary effort is normal. No respiratory distress. Breath sounds: Normal breath sounds. No stridor. Abdominal:      General: There is no distension. Palpations: Abdomen is soft. Tenderness: There is abdominal tenderness in the right lower quadrant. Genitourinary:     Comments: deferred  Musculoskeletal:         General: No deformity. Cervical back: No rigidity. Skin:     General: Skin is warm and dry. Neurological:      General: No focal deficit present. Mental Status: He is alert.    Psychiatric:         Mood and Affect: Mood normal. Behavior: Behavior normal.          [facility]  ED Course as of Aug 18 1516   Wed Aug 18, 2021   1500 EKG shows normal sinus rhythm at rate of 55, normal intervals, normal axis, normal ST segments and T waves. [TT]      ED Course User Index  [TT] Rachael Owen MD       Procedures        3:17 PM  Patient re-evaluated. Patient resting comfortably. All questions answered. Patient appropriate for discharge. Given return precautions and follow up instructions. LABORATORY TESTS:  Labs Reviewed   URINALYSIS W/MICROSCOPIC - Abnormal; Notable for the following components:       Result Value    Protein 30 (*)     All other components within normal limits   CBC WITH AUTOMATED DIFF - Abnormal; Notable for the following components:    RBC 3.37 (*)     HGB 10.0 (*)     HCT 30.4 (*)     RDW 17.0 (*)     NEUTROPHILS 84 (*)     MONOCYTES 3 (*)     IMMATURE GRANULOCYTES 1 (*)     ABS. IMM. GRANS. 0.1 (*)     All other components within normal limits   METABOLIC PANEL, COMPREHENSIVE - Abnormal; Notable for the following components:    Glucose 162 (*)     A-G Ratio 0.9 (*)     All other components within normal limits   URINE CULTURE HOLD SAMPLE   SAMPLES BEING HELD   SAMPLES BEING HELD   LIPASE       IMAGING RESULTS:  CT ABD PELV WO CONT   Final Result   No Acute Disease. MEDICATIONS GIVEN:  Medications   sodium chloride 0.9 % bolus infusion 1,000 mL (1,000 mL IntraVENous New Bag 8/18/21 1401)   morphine injection 4 mg (4 mg IntraVENous Given 8/18/21 1401)   ondansetron (ZOFRAN) injection 4 mg (4 mg IntraVENous Given 8/18/21 1401)       IMPRESSION:  1. Abdominal pain, right lower quadrant        PLAN:  1. Current Discharge Medication List      START taking these medications    Details   dicyclomine (BENTYL) 20 mg tablet Take 1 Tablet by mouth every six (6) hours for 7 days.   Qty: 28 Tablet, Refills: 0  Start date: 8/18/2021, End date: 8/25/2021      ondansetron (ZOFRAN ODT) 8 mg disintegrating tablet Take 1 Tablet by mouth every eight (8) hours as needed for Nausea. Qty: 12 Tablet, Refills: 0  Start date: 8/18/2021           2. Follow-up Information     Follow up With Specialties Details Why Contact Info    Carrie Hernandez MD Internal Medicine Schedule an appointment as soon as possible for a visit   Brandie Reagan [address] [facility]      Christina Cui MD Gastroenterology Schedule an appointment as soon as possible for a visit   [address] [facility]  [phone]      [address] [place] [address] DEPT Emergency Medicine  If symptoms worsen or new concerns [facility] [address] [facility]  Schedule an appointment as soon as possible for a visit  As needed for headaches [place] [address] [facility] [address] [phone]        3. Return to ED for new or worsening symptoms       Marcos Jennings. Crystal Archer MD        Please note that this dictation was completed with [employer], the computer voice recognition software. Quite often unanticipated grammatical, syntax, homophones, and other interpretive errors are inadvertently transcribed by the computer software. Please disregard these errors. Please excuse any errors that have escaped final proofreading.

## 2021-08-18 NOTE — ED TRIAGE NOTES
Pt arrives with LLQ abd pain with vomitting since 0200 this AM 10/10 constant pain. Last BM 8/17. Denies chest pain, shortness of breath, sick contacts, fevers. PMH-HTN. Was seen at PCP for headache on Monday and given prednisone.

## 2021-08-18 NOTE — ED NOTES
Pt recd and verbalized understanding of d/c instructions as well as s/s to seek further medical treatment.  Ambulatory to [facility] with daughter

## 2021-08-26 ENCOUNTER — TRANSCRIBE ORDER (OUTPATIENT)
Dept: SCHEDULING | Age: 71
End: 2021-08-26

## 2021-08-26 DIAGNOSIS — M54.12 CERVICAL RADICULITIS: Primary | ICD-10-CM

## 2021-08-27 ENCOUNTER — HOSPITAL ENCOUNTER (OUTPATIENT)
Dept: MRI IMAGING | Age: 71
Discharge: HOME | End: 2021-08-27
Attending: ORTHOPAEDIC SURGERY
Payer: MEDICARE

## 2021-08-27 DIAGNOSIS — M54.12 CERVICAL RADICULITIS: ICD-10-CM

## 2021-08-27 PROCEDURE — 72141 MRI NECK SPINE W/O DYE: CPT

## 2021-08-28 ENCOUNTER — HOSPITAL ENCOUNTER (EMERGENCY)
Age: 71
Discharge: HOME | End: 2021-08-28
Attending: EMERGENCY MEDICINE
Payer: MEDICARE

## 2021-08-28 ENCOUNTER — APPOINTMENT (OUTPATIENT)
Dept: CT IMAGING | Age: 71
End: 2021-08-28
Attending: EMERGENCY MEDICINE
Payer: MEDICARE

## 2021-08-28 VITALS
WEIGHT: 180 LBS | RESPIRATION RATE: 20 BRPM | DIASTOLIC BLOOD PRESSURE: 84 MMHG | BODY MASS INDEX: 28.25 KG/M2 | HEART RATE: 71 BPM | SYSTOLIC BLOOD PRESSURE: 165 MMHG | TEMPERATURE: 98 F | HEIGHT: 67 IN | OXYGEN SATURATION: 99 %

## 2021-08-28 DIAGNOSIS — K59.00 CONSTIPATION, UNSPECIFIED CONSTIPATION TYPE: Primary | ICD-10-CM

## 2021-08-28 DIAGNOSIS — R14.0 ABDOMINAL DISTENTION: ICD-10-CM

## 2021-08-28 DIAGNOSIS — R10.817 GENERALIZED ABDOMINAL TENDERNESS WITHOUT REBOUND TENDERNESS: ICD-10-CM

## 2021-08-28 LAB
ALBUMIN SERPL-MCNC: 4 G/DL (ref 3.5–5)
ALBUMIN/GLOB SERPL: 1 {RATIO} (ref 1.1–2.2)
ALP SERPL-CCNC: 64 U/L (ref 45–117)
ALT SERPL-CCNC: 42 U/L (ref 12–78)
ANION GAP SERPL CALC-SCNC: 10 MMOL/L (ref 5–15)
APPEARANCE UR: ABNORMAL
AST SERPL-CCNC: 16 U/L (ref 15–37)
BACTERIA URNS QL MICRO: NEGATIVE /HPF
BASOPHILS # BLD: 0 K/UL (ref 0–0.1)
BASOPHILS NFR BLD: 0 % (ref 0–1)
BILIRUB SERPL-MCNC: 0.3 MG/DL (ref 0.2–1)
BILIRUB UR QL: NEGATIVE
BNP SERPL-MCNC: 172 PG/ML (ref 0–125)
BUN SERPL-MCNC: 15 MG/DL (ref 6–20)
BUN/CREAT SERPL: 13 (ref 12–20)
CALCIUM SERPL-MCNC: 9.5 MG/DL (ref 8.5–10.1)
CHLORIDE SERPL-SCNC: 99 MMOL/L (ref 97–108)
CO2 SERPL-SCNC: 28 MMOL/L (ref 21–32)
COLOR UR: ABNORMAL
CREAT SERPL-MCNC: 1.12 MG/DL (ref 0.7–1.3)
DIFFERENTIAL METHOD BLD: ABNORMAL
EOSINOPHIL # BLD: 0.2 K/UL (ref 0–0.4)
EOSINOPHIL NFR BLD: 2 % (ref 0–7)
EPITH CASTS URNS QL MICRO: ABNORMAL /LPF
ERYTHROCYTE [DISTWIDTH] IN BLOOD BY AUTOMATED COUNT: 16.9 % (ref 11.5–14.5)
GLOBULIN SER CALC-MCNC: 4.1 G/DL (ref 2–4)
GLUCOSE SERPL-MCNC: 110 MG/DL (ref 65–100)
GLUCOSE UR STRIP.AUTO-MCNC: NEGATIVE MG/DL
HCT VFR BLD AUTO: 33.3 % (ref 36.6–50.3)
HGB BLD-MCNC: 11 G/DL (ref 12.1–17)
HGB UR QL STRIP: NEGATIVE
IMM GRANULOCYTES # BLD AUTO: 0 K/UL (ref 0–0.04)
IMM GRANULOCYTES NFR BLD AUTO: 0 % (ref 0–0.5)
KETONES UR QL STRIP.AUTO: NEGATIVE MG/DL
LEUKOCYTE ESTERASE UR QL STRIP.AUTO: NEGATIVE
LIPASE SERPL-CCNC: 121 U/L (ref 73–393)
LYMPHOCYTES # BLD: 1.7 K/UL (ref 0.8–3.5)
LYMPHOCYTES NFR BLD: 21 % (ref 12–49)
MAGNESIUM SERPL-MCNC: 2.1 MG/DL (ref 1.6–2.4)
MCH RBC QN AUTO: 30 PG (ref 26–34)
MCHC RBC AUTO-ENTMCNC: 33 G/DL (ref 30–36.5)
MCV RBC AUTO: 90.7 FL (ref 80–99)
MONOCYTES # BLD: 0.9 K/UL (ref 0–1)
MONOCYTES NFR BLD: 11 % (ref 5–13)
NEUTS SEG # BLD: 5.3 K/UL (ref 1.8–8)
NEUTS SEG NFR BLD: 65 % (ref 32–75)
NITRITE UR QL STRIP.AUTO: NEGATIVE
NRBC # BLD: 0 K/UL (ref 0–0.01)
NRBC BLD-RTO: 0 PER 100 WBC
PH UR STRIP: 6 [PH] (ref 5–8)
PLATELET # BLD AUTO: 301 K/UL (ref 150–400)
PMV BLD AUTO: 10.3 FL (ref 8.9–12.9)
POTASSIUM SERPL-SCNC: 4.6 MMOL/L (ref 3.5–5.1)
PROT SERPL-MCNC: 8.1 G/DL (ref 6.4–8.2)
PROT UR STRIP-MCNC: NEGATIVE MG/DL
RBC # BLD AUTO: 3.67 M/UL (ref 4.1–5.7)
RBC #/AREA URNS HPF: ABNORMAL /HPF (ref 0–5)
SODIUM SERPL-SCNC: 137 MMOL/L (ref 136–145)
SP GR UR REFRACTOMETRY: 1.01 (ref 1–1.03)
TROPONIN I SERPL-MCNC: <0.05 NG/ML
UROBILINOGEN UR QL STRIP.AUTO: 0.2 EU/DL (ref 0.2–1)
WBC # BLD AUTO: 8.1 K/UL (ref 4.1–11.1)
WBC URNS QL MICRO: ABNORMAL /HPF (ref 0–4)

## 2021-08-28 PROCEDURE — 84484 ASSAY OF TROPONIN QUANT: CPT

## 2021-08-28 PROCEDURE — 85025 COMPLETE CBC W/AUTO DIFF WBC: CPT

## 2021-08-28 PROCEDURE — 99284 EMERGENCY DEPT VISIT MOD MDM: CPT

## 2021-08-28 PROCEDURE — 96375 TX/PRO/DX INJ NEW DRUG ADDON: CPT

## 2021-08-28 PROCEDURE — 96374 THER/PROPH/DIAG INJ IV PUSH: CPT

## 2021-08-28 PROCEDURE — 74011000636 HC RX REV CODE- 636: Performed by: EMERGENCY MEDICINE

## 2021-08-28 PROCEDURE — 74011250636 HC RX REV CODE- 250/636: Performed by: EMERGENCY MEDICINE

## 2021-08-28 PROCEDURE — 81001 URINALYSIS AUTO W/SCOPE: CPT

## 2021-08-28 PROCEDURE — 83735 ASSAY OF MAGNESIUM: CPT

## 2021-08-28 PROCEDURE — 83880 ASSAY OF NATRIURETIC PEPTIDE: CPT

## 2021-08-28 PROCEDURE — 83690 ASSAY OF LIPASE: CPT

## 2021-08-28 PROCEDURE — 93005 ELECTROCARDIOGRAM TRACING: CPT

## 2021-08-28 PROCEDURE — 36415 COLL VENOUS BLD VENIPUNCTURE: CPT

## 2021-08-28 PROCEDURE — 74177 CT ABD & PELVIS W/CONTRAST: CPT

## 2021-08-28 PROCEDURE — 80053 COMPREHEN METABOLIC PANEL: CPT

## 2021-08-28 RX ORDER — FENTANYL CITRATE 50 UG/ML
50 INJECTION, SOLUTION INTRAMUSCULAR; INTRAVENOUS
Status: COMPLETED | OUTPATIENT
Start: 2021-08-28 | End: 2021-08-28

## 2021-08-28 RX ORDER — KETOROLAC TROMETHAMINE 30 MG/ML
15 INJECTION, SOLUTION INTRAMUSCULAR; INTRAVENOUS
Status: COMPLETED | OUTPATIENT
Start: 2021-08-28 | End: 2021-08-28

## 2021-08-28 RX ORDER — ONDANSETRON 2 MG/ML
4 INJECTION INTRAMUSCULAR; INTRAVENOUS
Status: COMPLETED | OUTPATIENT
Start: 2021-08-28 | End: 2021-08-28

## 2021-08-28 RX ADMIN — KETOROLAC TROMETHAMINE 15 MG: 30 INJECTION, SOLUTION INTRAMUSCULAR; INTRAVENOUS at 20:01

## 2021-08-28 RX ADMIN — IOPAMIDOL 100 ML: 755 INJECTION, SOLUTION INTRAVENOUS at 21:46

## 2021-08-28 RX ADMIN — FENTANYL CITRATE 50 MCG: 50 INJECTION INTRAMUSCULAR; INTRAVENOUS at 20:01

## 2021-08-28 RX ADMIN — SODIUM CHLORIDE 1000 ML: 9 INJECTION, SOLUTION INTRAVENOUS at 20:00

## 2021-08-28 RX ADMIN — ONDANSETRON 4 MG: 2 INJECTION INTRAMUSCULAR; INTRAVENOUS at 20:01

## 2021-08-28 NOTE — ED TRIAGE NOTES
Patient complains of abdominal pain x 3 days. Denies any N/V. Last BM today. Patient also complains of right arm pain for over a week. Patient saw his PCP on Thursday for arm pain. Patient had MRI yesterday for arm pain.

## 2021-08-29 NOTE — ED NOTES
All results reviewed by Dr Mandi Dinh and patient is discharged home with prescription and instructions. Verbalized understanding of instructions. . Left for home in no acute distress with son. Saline lock discontinued, catheter intact.

## 2021-08-29 NOTE — DISCHARGE INSTRUCTIONS
Thank you for allowing us to provide you with medical care today. We realize that you have many choices for your emergency care needs. We thank you for choosing [facility]. Please choose us in the future for any continued health care needs. We hope we addressed all of your medical concerns. We strive to provide excellent quality care in the Emergency Department. Anything less than excellent does not meet our expectations. The exam and treatment you received in the Emergency Department were for an emergent problem and are not intended as complete care. It is important that you follow up with a doctor, nurse practitioner, or [address] [place] [address] assistant for ongoing care. If your symptoms worsen or you do not improve as expected and you are unable to reach your usual health care provider, you should return to the Emergency Department. We are available 24 hours a day. Take this sheet with you when you go to your follow-up visit. If you have any problem arranging the follow-up visit, contact the Emergency Department immediately. Make an appointment your family doctor for follow up of this visit. Return to the ER if you are unable to be seen in a timely manner.

## 2021-08-30 LAB
ATRIAL RATE: 77 BPM
CALCULATED P AXIS, ECG09: 54 DEGREES
CALCULATED R AXIS, ECG10: 0 DEGREES
CALCULATED T AXIS, ECG11: 31 DEGREES
DIAGNOSIS, 93000: NORMAL
P-R INTERVAL, ECG05: 132 MS
Q-T INTERVAL, ECG07: 386 MS
QRS DURATION, ECG06: 84 MS
QTC CALCULATION (BEZET), ECG08: 436 MS
VENTRICULAR RATE, ECG03: 77 BPM

## 2021-09-08 ENCOUNTER — OFFICE VISIT (OUTPATIENT)
Dept: NEUROLOGY | Age: 71
End: 2021-09-08
Payer: MEDICARE

## 2021-09-08 VITALS
DIASTOLIC BLOOD PRESSURE: 84 MMHG | SYSTOLIC BLOOD PRESSURE: 138 MMHG | RESPIRATION RATE: 20 BRPM | HEART RATE: 95 BPM | OXYGEN SATURATION: 98 %

## 2021-09-08 DIAGNOSIS — G57.83 OTHER SPECIFIED MONONEUROPATHIES OF BILATERAL LOWER LIMBS: ICD-10-CM

## 2021-09-08 DIAGNOSIS — R20.2 PARESTHESIA: Primary | ICD-10-CM

## 2021-09-08 PROCEDURE — 99205 OFFICE O/P NEW HI 60 MIN: CPT | Performed by: PSYCHIATRY & NEUROLOGY

## 2021-09-08 NOTE — LETTER
9/8/2021    Patient: Elver Boogie   YOB: 1950   Date of Visit: 9/8/2021     Natty Eisenberg MD  [address] [facility] Charmaine Paez [address]  Via Fax: [address] [place]  [address] [facility] [address] [facility] [address]  Via Fax: [phone]    Dear MD Nghia Shah MD,      Thank you for referring Mr. Elver Boogie to [facility] for evaluation. My notes for this consultation are attached. If you have questions, please do not hesitate to call me. I look forward to following your patient along with you.       Sincerely,    Mario Joseph MD

## 2021-09-08 NOTE — PROGRESS NOTES
May- back surgery  July/August he was complaining of neck pain, tinging in his fingertips, then through the hand, still had strength in his left hand walking was okay but some impairment   Dr. Mohan Plants did the surgery  Went back for a follow up and his head was manipulated in different areas checking for a pinched nerve August 26th   Right leg started first, muscle tightness, achy feeling

## 2021-09-08 NOTE — PROGRESS NOTES
NEUROLOGY NEW PATIENT OFFICE CONSULTATION      9/8/2021    RE: Nicolasa Coyle         1950      REFERRED BY:  Emma Corona MD        CHIEF COMPLAINT:  This is Nicolasa Coyle is a 66 y.o. male right handed retired [employer] who had concerns including New Patient (muscle weakness in hands and legs ). HPI:     Patient was doing fine after lower back surgery and had a follow up with Dr Rhina Mcghee Aug 26, 2021. Was complaining of neck pain and weakness of the R UE since July and so was examined by Dr Rhina Mcghee who \"twisted his neck\". A few hours after seeing Dr Rhina Mcghee, patient noted weakness of the R LE described as tight muscles of the thigh. L leg is okay. Currently, patient noted progressive weakness of both UE, R worse than L with more weakness of the R LE. (+) numbness of finger tips of both hand. (-) numbness in legs. (-) neck pain   (+) constipation  (-) dysarthria  (-) dysphagia        Previous tests done:  EMG/NCS from outside c/o Dr J Luis Hobbs (9/3/21)  There is electrodiagnostic evidence of active bilateral cervical   radiculopathies involving the right C6, C7 and C8 nerve roots in the left   C5, C6 and C7 nerve roots. Electrodiagnostic evidence of a distal symmetric sensorimotor polyneuropathy, primarily demyelinating.  Etiology is unknown at this   time. MRI cervical spine (8/28/21)  Multilevel minimal degenerative change with moderate congenital narrowing   cervical canal.   Moderate canal and bilateral foraminal stenosis at C4-5. Mild canal, moderate to severe right and moderate left foraminal stenosis at   C5-6. Mild canal stenoses at C3-4, C7-T1.    Moderate to severe foraminal stenosis at C7-T1.         ROS   (-) fever  (-) rash  All other systems reviewed and are negative    Past Medical Hx  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/06/2021    Nares   L3- L5 Lower back surgery May 17, 2021 c/o Dr Deep Cesar - did okay with surgery with no more lower back pain    Social Hx  Social History     Socioeconomic History    Marital status:      Spouse name: Not on file    Number of children: Not on file    Years of education: Not on file    Highest education level: Not on file   Tobacco Use    Smoking status: Never Smoker    Smokeless tobacco: Never Used   Substance and Sexual Activity    Alcohol use: Yes     Comment: social    Drug use: Never     Social Determinants of Health     Financial Resource Strain:     Difficulty of Paying Living Expenses:    Food Insecurity:     Worried About Running Out of Food in the Last Year:     [address] in the Last Year:    Transportation Needs:     Lack of Transportation (Medical):  Lack of Transportation (Non-Medical):    Physical Activity:     Days of Exercise per Week:     Minutes of Exercise per Session:    Stress:     Feeling of Stress :    Social Connections:     Frequency of Communication with Friends and Family:     Frequency of Social Gatherings with Friends and Family:     Attends [employer] Services:     Active Member of Clubs or Organizations:     Attends Club or Organization Meetings:     Marital Status:    Lives by himself    Family Hx  Family History   Problem Relation Age of Onset    Anesth Problems Neg Hx     Clotting Disorder Neg Hx        ALLERGIES  Allergies   Allergen Reactions    Morphine Other (comments)     bloating       CURRENT MEDS  Current Outpatient Medications   Medication Sig Dispense Refill    losartan (COZAAR) 100 mg tablet Take 100 mg by mouth daily. Indications: high blood pressure      docusate sodium (COLACE) 100 mg capsule Take 1 Capsule by mouth two (2) times a day. 60 Capsule 0    amLODIPine (Norvasc) 10 mg tablet Take 10 mg by mouth daily.       spironolactone (ALDACTONE) 25 mg tablet Take 25 mg by mouth daily.  doxazosin (CARDURA) 8 mg tablet Take 8 mg by mouth daily.  allopurinoL (ZYLOPRIM) 300 mg tablet Take 600 mg by mouth daily.  finasteride (PROSCAR) 5 mg tablet Take 5 mg by mouth daily.  vitamin E mixed/tocotrienol (VITAMIN E COMPLEX PO) Take 184 mg by mouth daily.  garlic 0,333 mg cap Take 1 Tablet by mouth daily.  multivitamin (Men's Multi-Vitamin) tablet Take 1 Tablet by mouth daily.  omega 3-dha-epa-fish oil (Fish Oil) 100-160-1,000 mg cap Take 1 Capsule by mouth daily.  ondansetron (ZOFRAN ODT) 8 mg disintegrating tablet Take 1 Tablet by mouth every eight (8) hours as needed for Nausea. (Patient not taking: Reported on 8/28/2021) 12 Tablet 0    cyclobenzaprine (FLEXERIL) 5 mg tablet Take 5 mg by mouth three (3) times daily as needed for Muscle Spasm(s). (Patient not taking: Reported on 9/8/2021)      lisinopriL (PRINIVIL, ZESTRIL) 40 mg tablet Take 40 mg by mouth daily. (Patient not taking: Reported on 9/8/2021)             PREVIOUS WORKUP: (reviewed)  IMAGING:    CT Results (recent):  Results from Hospital Encounter encounter on 08/28/21    CT ABD PELV W CONT    Narrative  EXAM: CT ABD PELV W CONT    INDICATION: Abdominal distention    COMPARISON: 8/18/2021    CONTRAST: 100 mL of Isovue-370. TECHNIQUE:  Following the uneventful intravenous administration of contrast, thin axial  images were obtained through the abdomen and pelvis. Coronal and sagittal  reconstructions were generated. Oral contrast was not administered. CT dose  reduction was achieved through use of a standardized protocol tailored for this  examination and automatic exposure control for dose modulation. FINDINGS:  LOWER THORAX: No significant abnormality in the incidentally imaged lower chest.  LIVER: Small hepatic cysts are stable. BILIARY TREE: Gallbladder is within normal limits. CBD is not dilated. SPLEEN: within normal limits.   PANCREAS: No mass or ductal dilatation. ADRENALS: Unremarkable. KIDNEYS: Small bilateral renal cysts, no follow-up required. No renal mass or  hydronephrosis. STOMACH: Unremarkable. SMALL BOWEL: No dilatation or wall thickening. COLON: No dilatation or wall thickening. Significant fecal stasis is noted. APPENDIX: Within normal limits. PERITONEUM: No ascites or pneumoperitoneum. RETROPERITONEUM: No lymphadenopathy or aortic aneurysm. REPRODUCTIVE ORGANS: There is no pelvic mass or lymphadenopathy. URINARY BLADDER: Markedly distended. BONES: Postoperative changes are seen in the lumbar spine. ABDOMINAL WALL: No mass or hernia. ADDITIONAL COMMENTS: N/A    Impression  Significant fecal stasis. Marked bladder distention. No acute abnormality. MRI Results (recent):  Results from [place] encounter on 08/27/21    MRI CERV SPINE WO CONT    Narrative  EXAM: MRI CERV SPINE WO CONT  Clinical history: Cervical radiculopathy  INDICATION: . Cervical radiculopathy    COMPARISON: None    TECHNIQUE: MR imaging of the cervical spine was performed using the following  sequences: sagittal T1, T2, STIR;  axial T2, T1.    CONTRAST:  None. FINDINGS:    There is congenital narrowing of the cervical canal. B cervical lordosis. Vertebral body heights are maintained. Discogenic degenerative changes. The craniocervical junction is intact. The course, caliber, and signal intensity  of the spinal cord are normal.    The paraspinal soft tissues are within normal limits. C2-C3: Facet arthropathy is mild. Disc bulge/osteophyte. Mild canal stenosis. Foramina are patent    C3-C4: Mild facet arthropathy. Disc bulge/osteophyte. Mild canal stenosis. Moderate bilateral foraminal stenoses. C4-C5: Facet arthropathy is slightly greater on the left. Disc desiccation and  disc bulge. Moderate canal and bilateral foraminal stenoses. C5-C6: Mild facet arthropathy. Disc bulge/osteophyte.  Mild canal, moderate to  severe right and moderate left foraminal stenosis. C6-C7: Disc desiccation. Mild facet arthropathy. Canal is patent. Mild to  moderate left foraminal stenosis. C7-T1: Disc protrusion/osteophyte at the right neural foramen. Mild canal  stenosis. Moderate to severe right foraminal stenosis. There is incidentally noted lipoma in the pericervical tissue on the right. Impression  Multilevel minimal degenerative change with moderate congenital narrowing  cervical canal.  Moderate canal and bilateral foraminal stenosis at C4-5. Mild canal, moderate to severe right and moderate left foraminal stenosis at  C5-6. Mild canal stenoses at C3-4, C7-T1. Moderate to severe foraminal stenosis at C7-T1. IR Results (recent):  No results found for this or any previous visit. VAS/US Results (recent):  No results found for this or any previous visit. LABS (reviewed)  Results for orders placed or performed during the hospital encounter of 08/28/21   CBC WITH AUTOMATED DIFF   Result Value Ref Range    WBC 8.1 4.1 - 11.1 K/uL    RBC 3.67 (L) 4.10 - 5.70 M/uL    HGB 11.0 (L) 12.1 - 17.0 g/dL    HCT 33.3 (L) 36.6 - 50.3 %    MCV 90.7 80.0 - 99.0 FL    MCH 30.0 26.0 - 34.0 PG    MCHC 33.0 30.0 - 36.5 g/dL    RDW 16.9 (H) 11.5 - 14.5 %    PLATELET 160 775 - 662 K/uL    MPV 10.3 8.9 - 12.9 FL    NRBC 0.0 0.0  WBC    ABSOLUTE NRBC 0.00 0.00 - 0.01 K/uL    NEUTROPHILS 65 32 - 75 %    LYMPHOCYTES 21 12 - 49 %    MONOCYTES 11 5 - 13 %    EOSINOPHILS 2 0 - 7 %    BASOPHILS 0 0 - 1 %    IMMATURE GRANULOCYTES 0 0 - 0.5 %    ABS. NEUTROPHILS 5.3 1.8 - 8.0 K/UL    ABS. LYMPHOCYTES 1.7 0.8 - 3.5 K/UL    ABS. MONOCYTES 0.9 0.0 - 1.0 K/UL    ABS. EOSINOPHILS 0.2 0.0 - 0.4 K/UL    ABS. BASOPHILS 0.0 0.0 - 0.1 K/UL    ABS. IMM.  GRANS. 0.0 0.00 - 0.04 K/UL    DF AUTOMATED     METABOLIC PANEL, COMPREHENSIVE   Result Value Ref Range    Sodium 137 136 - 145 mmol/L    Potassium 4.6 3.5 - 5.1 mmol/L    Chloride 99 97 - 108 mmol/L    CO2 28 21 - 32 mmol/L Anion gap 10 5 - 15 mmol/L    Glucose 110 (H) 65 - 100 mg/dL    BUN 15 6 - 20 MG/DL    Creatinine 1.12 0.70 - 1.30 MG/DL    BUN/Creatinine ratio 13 12 - 20      GFR est AA >60 >60 ml/min/1.73m2    GFR est non-AA >60 >60 ml/min/1.73m2    Calcium 9.5 8.5 - 10.1 MG/DL    Bilirubin, total 0.3 0.2 - 1.0 MG/DL    ALT (SGPT) 42 12 - 78 U/L    AST (SGOT) 16 15 - 37 U/L    Alk.  phosphatase 64 45 - 117 U/L    Protein, total 8.1 6.4 - 8.2 g/dL    Albumin 4.0 3.5 - 5.0 g/dL    Globulin 4.1 (H) 2.0 - 4.0 g/dL    A-G Ratio 1.0 (L) 1.1 - 2.2     LIPASE   Result Value Ref Range    Lipase 121 73 - 393 U/L   TROPONIN I   Result Value Ref Range    Troponin-I, Qt. <0.05 <0.05 ng/mL   MAGNESIUM   Result Value Ref Range    Magnesium 2.1 1.6 - 2.4 mg/dL   NT-PRO BNP   Result Value Ref Range    NT pro- (H) 0 - 125 PG/ML   URINALYSIS W/MICROSCOPIC   Result Value Ref Range    Color YELLOW/STRAW      Appearance HAZY (A) CLEAR      Specific gravity 1.010 1.003 - 1.030      pH (UA) 6.0 5.0 - 8.0      Protein Negative NEG mg/dL    Glucose Negative NEG mg/dL    Ketone Negative NEG mg/dL    Bilirubin Negative NEG      Blood Negative NEG      Urobilinogen 0.2 0.2 - 1.0 EU/dL    Nitrites Negative NEG      Leukocyte Esterase Negative NEG      WBC 0-4 0 - 4 /hpf    RBC 0-5 0 - 5 /hpf    Epithelial cells FEW FEW /lpf    Bacteria Negative NEG /hpf   EKG, 12 LEAD, INITIAL   Result Value Ref Range    Ventricular Rate 77 BPM    Atrial Rate 77 BPM    P-R Interval 132 ms    QRS Duration 84 ms    Q-T Interval 386 ms    QTC Calculation (Bezet) 436 ms    Calculated P Axis 54 degrees    Calculated R Axis 0 degrees    Calculated T Axis 31 degrees    Diagnosis       Normal sinus rhythm  Minimal voltage criteria for LVH, may be normal variant ( R in aVL )  Nonspecific T wave abnormality  Abnormal ECG  When compared with ECG of 18-AUG-2021 13:26,  No significant change was found  Confirmed by Ming Sun MD. ([id number]) on 8/30/2021 12:36:28 PM Physical Exam:     Visit Vitals  /84   Pulse 95   Resp 20   SpO2 98%     General:  Alert, cooperative, no distress. Head:  Normocephalic, without obvious abnormality, atraumatic. Eyes:  Conjunctivae/corneas clear. Lungs:  Heart:   Non labored breathing  Regular rate and rhythm, no carotid bruits   Abdomen:   Soft, non-distended   Extremities: Extremities normal, atraumatic, no cyanosis or edema. Pulses: 2+ and symmetric all extremities. Skin: Skin color, texture, turgor normal. No rashes or lesions.   Neurologic Exam     Motor Exam     Strength   Right neck flexion: 5/5  Left neck flexion: 5/5  Right neck extension: 5/5  Left neck extension: 5/5  Right deltoid: 3/5  Left deltoid: 3/5  Right biceps: 1/5  Left biceps: 1/5  Right triceps: 3/5  Left triceps: 3/5  Right wrist flexion: 1/5  Left wrist flexion: 1/5  Right wrist extension: 1/5  Left wrist extension: 1/5  Right interossei: 1/5  Left interossei: 2/5  Right iliopsoas: 4/5  Left iliopsoas: 5/5  Right quadriceps: 4/5  Left quadriceps: 5/5  Right hamstrin/5  Left hamstrin/5  Left glutei: 5/5  Right anterior tibial: 5/5  Left anterior tibial: 5/5  Right posterior tibial: 5/5  Left posterior tibial: 5/5  Right peroneal: 5/5  Left peroneal: 5/5  Right gastroc: 5/5  Left gastroc: 5/5       Gen: Attention normal             Language: naming, repetition, fluency normal             Memory: intact recent and remote memory  Cranial Nerves:  I: smell Not tested   II: visual fields Full to confrontation   II: pupils Equal, round, reactive to light   II: optic disc No papilledema   III,VII: ptosis none   III,IV,VI: extraocular muscles  Full ROM   V: mastication normal   V: facial light touch sensation  normal   VII: facial muscle function   symmetric   VIII: hearing symmetric   IX: soft palate elevation  normal   XI: trapezius strength  5/5   XI: sternocleidomastoid strength 5/5   XI: neck flexion strength  5/5   XII: tongue  midline     Motor: see above  Sensory: reduced PP tips of fingers of both hands  Reflexes: 1+ UE, absent knees and ankles; Down going toes  Coordination: Good FTN and HTS  Gait: needed assistance in standing up. Able to stand on toes, but problem with heels on R LE         Impression:     Alycia Cardenas is a 66 y.o. male who  has a past medical history of Anemia (05/07/2021), BPH (benign prostatic hyperplasia), History of gout, HTN (hypertension), Low vitamin D level, and MSSA (methicillin-susceptible Staphylococcus aureus) colonization (05/06/2021). who was doing fine after L3- L5 Lower back surgery May 17, 2021 c/o Dr Kathreen Pallas and had a follow up with Dr Kathreen Pallas Aug 26, 2021. Was complaining of neck pain and weakness of the R UE since July and so was examined by Dr Kathreen Pallas who \"twisted his neck\". A few hours after seeing Dr Kathreen Pallas, patient noted weakness of the R LE described as tight muscles of the thigh. L leg is okay. Currently, patient noted progressive weakness of both UE, R worse than L with more weakness of the R LE. (+) numbness of finger tips of both hand. Consideration includes Guillain barre syndrome (absent reflexes in LE which daughter said he had before) vs CIDP. Evaluate for other possible causes. RECOMMENDATIONS  1. I had a long discussion with patient and daugther. Discussed diagnosis, prognosis, pathophysiology and available treatment. Reviewed test results. All questions were answered. 2. Reviewed medical records from outside  3. Blood test for CPK, SELVIN, ESR, MG panel, TSH, Vit B12, Folate, Vit B6  4. EMG/NCS of R UE and R LE with polyneuropathy protocol  5. Depending on above, will consider IVIG and Physical therapy        Follow-up and Dispositions    · Return for above testing.             Thank you for the consultation      Jose Michel MD  Diplomate, American Board of Psychiatry and Neurology  Diplomate, Neuromuscular Medicine  Diplomate, American Board of Electrodiagnostic Medicine        CC: Julisa Griffin, MD  Fax: [phone]

## 2021-09-09 ENCOUNTER — OFFICE VISIT (OUTPATIENT)
Dept: NEUROLOGY | Age: 71
End: 2021-09-09
Payer: MEDICARE

## 2021-09-09 ENCOUNTER — HOSPITAL ENCOUNTER (INPATIENT)
Age: 71
LOS: 7 days | Discharge: NURSING, REHAB OR LONG-TERM CARE | DRG: 096 | End: 2021-09-16
Attending: EMERGENCY MEDICINE | Admitting: HOSPITALIST
Payer: MEDICARE

## 2021-09-09 ENCOUNTER — APPOINTMENT (OUTPATIENT)
Dept: CT IMAGING | Age: 71
DRG: 096 | End: 2021-09-09
Attending: FAMILY MEDICINE
Payer: MEDICARE

## 2021-09-09 DIAGNOSIS — G61.0 GUILLAIN BARRÉ SYNDROME (HCC): Primary | ICD-10-CM

## 2021-09-09 DIAGNOSIS — R20.2 PARESTHESIA: Primary | ICD-10-CM

## 2021-09-09 DIAGNOSIS — I10 HYPERTENSION, UNSPECIFIED TYPE: ICD-10-CM

## 2021-09-09 DIAGNOSIS — R20.2 TINGLING IN EXTREMITIES: ICD-10-CM

## 2021-09-09 DIAGNOSIS — R10.9 ABDOMINAL DISCOMFORT: ICD-10-CM

## 2021-09-09 LAB
ALBUMIN SERPL-MCNC: 3.8 G/DL (ref 3.5–5)
ALBUMIN/GLOB SERPL: 0.7 {RATIO} (ref 1.1–2.2)
ALP SERPL-CCNC: 101 U/L (ref 45–117)
ALT SERPL-CCNC: 46 U/L (ref 12–78)
ANION GAP SERPL CALC-SCNC: 6 MMOL/L (ref 5–15)
AST SERPL-CCNC: 37 U/L (ref 15–37)
BASOPHILS # BLD: 0 K/UL (ref 0–0.1)
BASOPHILS NFR BLD: 1 % (ref 0–1)
BILIRUB SERPL-MCNC: 0.4 MG/DL (ref 0.2–1)
BUN SERPL-MCNC: 13 MG/DL (ref 6–20)
BUN/CREAT SERPL: 14 (ref 12–20)
CALCIUM SERPL-MCNC: 9.8 MG/DL (ref 8.5–10.1)
CHLORIDE SERPL-SCNC: 102 MMOL/L (ref 97–108)
CO2 SERPL-SCNC: 27 MMOL/L (ref 21–32)
COMMENT, HOLDF: NORMAL
CREAT SERPL-MCNC: 0.91 MG/DL (ref 0.7–1.3)
DIFFERENTIAL METHOD BLD: ABNORMAL
EOSINOPHIL # BLD: 0.1 K/UL (ref 0–0.4)
EOSINOPHIL NFR BLD: 3 % (ref 0–7)
ERYTHROCYTE [DISTWIDTH] IN BLOOD BY AUTOMATED COUNT: 15.6 % (ref 11.5–14.5)
GLOBULIN SER CALC-MCNC: 5.1 G/DL (ref 2–4)
GLUCOSE SERPL-MCNC: 110 MG/DL (ref 65–100)
HCT VFR BLD AUTO: 34.3 % (ref 36.6–50.3)
HGB BLD-MCNC: 11 G/DL (ref 12.1–17)
IMM GRANULOCYTES # BLD AUTO: 0 K/UL (ref 0–0.04)
IMM GRANULOCYTES NFR BLD AUTO: 0 % (ref 0–0.5)
LYMPHOCYTES # BLD: 1.1 K/UL (ref 0.8–3.5)
LYMPHOCYTES NFR BLD: 23 % (ref 12–49)
MAGNESIUM SERPL-MCNC: 2 MG/DL (ref 1.6–2.4)
MCH RBC QN AUTO: 29.9 PG (ref 26–34)
MCHC RBC AUTO-ENTMCNC: 32.1 G/DL (ref 30–36.5)
MCV RBC AUTO: 93.2 FL (ref 80–99)
MONOCYTES # BLD: 0.5 K/UL (ref 0–1)
MONOCYTES NFR BLD: 10 % (ref 5–13)
NEUTS SEG # BLD: 3.1 K/UL (ref 1.8–8)
NEUTS SEG NFR BLD: 63 % (ref 32–75)
NRBC # BLD: 0 K/UL (ref 0–0.01)
NRBC BLD-RTO: 0 PER 100 WBC
PLATELET # BLD AUTO: 339 K/UL (ref 150–400)
PMV BLD AUTO: 10 FL (ref 8.9–12.9)
POTASSIUM SERPL-SCNC: 4.5 MMOL/L (ref 3.5–5.1)
PROT SERPL-MCNC: 8.9 G/DL (ref 6.4–8.2)
RBC # BLD AUTO: 3.68 M/UL (ref 4.1–5.7)
SAMPLES BEING HELD,HOLD: NORMAL
SODIUM SERPL-SCNC: 135 MMOL/L (ref 136–145)
WBC # BLD AUTO: 4.9 K/UL (ref 4.1–11.1)

## 2021-09-09 PROCEDURE — 1101F PT FALLS ASSESS-DOCD LE1/YR: CPT | Performed by: PSYCHIATRY & NEUROLOGY

## 2021-09-09 PROCEDURE — G8427 DOCREV CUR MEDS BY ELIG CLIN: HCPCS | Performed by: PSYCHIATRY & NEUROLOGY

## 2021-09-09 PROCEDURE — G8536 NO DOC ELDER MAL SCRN: HCPCS | Performed by: PSYCHIATRY & NEUROLOGY

## 2021-09-09 PROCEDURE — 83735 ASSAY OF MAGNESIUM: CPT

## 2021-09-09 PROCEDURE — G8419 CALC BMI OUT NRM PARAM NOF/U: HCPCS | Performed by: PSYCHIATRY & NEUROLOGY

## 2021-09-09 PROCEDURE — 65270000029 HC RM PRIVATE

## 2021-09-09 PROCEDURE — 95912 NRV CNDJ TEST 11-12 STUDIES: CPT | Performed by: PSYCHIATRY & NEUROLOGY

## 2021-09-09 PROCEDURE — 99213 OFFICE O/P EST LOW 20 MIN: CPT | Performed by: PSYCHIATRY & NEUROLOGY

## 2021-09-09 PROCEDURE — 85025 COMPLETE CBC W/AUTO DIFF WBC: CPT

## 2021-09-09 PROCEDURE — 3017F COLORECTAL CA SCREEN DOC REV: CPT | Performed by: PSYCHIATRY & NEUROLOGY

## 2021-09-09 PROCEDURE — 74011250636 HC RX REV CODE- 250/636: Performed by: FAMILY MEDICINE

## 2021-09-09 PROCEDURE — 74011250637 HC RX REV CODE- 250/637: Performed by: FAMILY MEDICINE

## 2021-09-09 PROCEDURE — 80053 COMPREHEN METABOLIC PANEL: CPT

## 2021-09-09 PROCEDURE — G8432 DEP SCR NOT DOC, RNG: HCPCS | Performed by: PSYCHIATRY & NEUROLOGY

## 2021-09-09 PROCEDURE — 70450 CT HEAD/BRAIN W/O DYE: CPT

## 2021-09-09 PROCEDURE — 95886 MUSC TEST DONE W/N TEST COMP: CPT | Performed by: PSYCHIATRY & NEUROLOGY

## 2021-09-09 PROCEDURE — 82784 ASSAY IGA/IGD/IGG/IGM EACH: CPT

## 2021-09-09 PROCEDURE — 99283 EMERGENCY DEPT VISIT LOW MDM: CPT

## 2021-09-09 PROCEDURE — 36415 COLL VENOUS BLD VENIPUNCTURE: CPT

## 2021-09-09 RX ORDER — DIPHENHYDRAMINE HYDROCHLORIDE 50 MG/ML
50 INJECTION, SOLUTION INTRAMUSCULAR; INTRAVENOUS
Status: DISCONTINUED | OUTPATIENT
Start: 2021-09-09 | End: 2021-09-16 | Stop reason: HOSPADM

## 2021-09-09 RX ORDER — HYDRALAZINE HYDROCHLORIDE 20 MG/ML
10 INJECTION INTRAMUSCULAR; INTRAVENOUS ONCE
Status: COMPLETED | OUTPATIENT
Start: 2021-09-09 | End: 2021-09-09

## 2021-09-09 RX ORDER — ACETAMINOPHEN 325 MG/1
650 TABLET ORAL
Status: DISCONTINUED | OUTPATIENT
Start: 2021-09-09 | End: 2021-09-16 | Stop reason: HOSPADM

## 2021-09-09 RX ORDER — ACETAMINOPHEN 325 MG/1
650 TABLET ORAL ONCE
Status: COMPLETED | OUTPATIENT
Start: 2021-09-09 | End: 2021-09-09

## 2021-09-09 RX ORDER — DIPHENHYDRAMINE HCL 25 MG
25 CAPSULE ORAL ONCE
Status: COMPLETED | OUTPATIENT
Start: 2021-09-09 | End: 2021-09-09

## 2021-09-09 RX ADMIN — IMMUNE GLOBULIN (HUMAN) 10 G: 10 INJECTION INTRAVENOUS; SUBCUTANEOUS at 23:14

## 2021-09-09 RX ADMIN — IMMUNE GLOBULIN (HUMAN) 20 G: 10 INJECTION INTRAVENOUS; SUBCUTANEOUS at 20:22

## 2021-09-09 RX ADMIN — ACETAMINOPHEN 650 MG: 325 TABLET ORAL at 17:50

## 2021-09-09 RX ADMIN — DIPHENHYDRAMINE HYDROCHLORIDE 25 MG: 25 CAPSULE ORAL at 17:51

## 2021-09-09 RX ADMIN — HYDRALAZINE HYDROCHLORIDE 10 MG: 20 INJECTION INTRAMUSCULAR; INTRAVENOUS at 17:56

## 2021-09-09 NOTE — PROGRESS NOTES
EMG/NCS done. See Procedure Note for results. 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     A> Guillain [place] syndrome    P> 1) WIll send to [facility] ER for admission  2) Will need IVIG 0.4 gm/kg/day x 5 days  3) Recommend checking for GI infection associated with GBS (I.e. Campylobacter jejuni)  4) Physical therapy  5) Monitor for dysautonomia    Discussed with on call neurologist Dr Pablo Olvera and ER resident        Dennis Dempsey MD

## 2021-09-09 NOTE — H&P
Vinicio [facility] 78  HISTORY AND PHYSICAL    Name:  John Reid  MR#:  [id number]  :  1950  ACCOUNT #:  [de-identified]  ADMIT DATE:  2021      PRIMARY CARE PHYSICIAN:  Not known. PRESENTING COMPLAINT:  Extremity weakness. HISTORY OF PRESENTING ILLNESS:      The patient is a 67-year-old gentleman who was admitted to this hospital in 2021 for L3-L5 laminectomy with fusion, instrumentation and bone graft by Dr. Rody Salcedo. During that hospital stay, the patient had postoperative ileus which was treated with bowel regimen Relistor and was then discharged home. The patient tells me that his symptoms started in 2021. Initially his right hand was numb. Then he noticed that his left hand was numb. It was associated with ascending limb weakness. Later on he also had lower extremity weakness. The patient was subsequently seen by neurologist yesterday and it was thought that patient has polyneuropathy/Guillain-[place]. He had absent reflexes . The patient had EMG and nerve conduction velocity done today of the right upper and right lower extremity which was concerning for acute demyelinating polyradicular neuropathy. He was sent to be admitted for  IVIgG. The patient tells me that he had some abdominal pain and has been seen in different ERs but denies having any diarrhea. He had a CT done on 2021 which showed fecal stasis. The patient has not seen a GI doctor since then. He denies having any other complaints at this time. PAST MEDICAL HISTORY:      1. Anemia. 2. BHP. 3.  Gout. 4.  Hypertension. 5.  L3-L5 lower back surgery done on 2021 by Dr. Rody Salcedo. SOCIOECONOMIC HISTORY:      The patient is a . He does not smoke. He does not drink. REVIEW OF SYSTEMS:  Negative except as mentioned in history of presenting illness. All system are reviewed. No other positive finding was noted.     CURRENT MEDICATIONS:      Include docusate p.r.n., doxazosin 8 mg daily, finasteride 5 mg daily, losartan 100 mg daily, Aldactone 25 mg daily, Norvasc 10 mg daily and allopurinol 300 mg daily. FAMILY HISTORY:  No significant family history. ALLERGIES:  INCLUDE MORPHINE.    PHYSICAL EXAMINATION:    GENERAL:  The patient is a 65-year-old gentleman not in any acute distress. VITAL SIGNS:  Temperature 96.9, blood pressure 156/95, respiratory rate 16, saturation 99%. HEENT:  Pupils equally reactive to light and accommodation. NECK:  Supple. There is no lymphadenopathy or JVD. CHEST:  Clear. No significant wheezing or crackles. CARDIOVASCULAR SYSTEM:  S1 and S1 regular. No murmur. No S3.  ABDOMEN:  Reveals slightly distended abdomen but good bowel sounds. No tenderness, no guarding. No signs of chronic liver disease. EXTREMITIES:  No pedal edema. CNS:  Examination reveals the patient is alert, oriented. He has 1/5 strength in the right. Wrist flexion and left side, he has 2/5 strength on the right, lower extremity is 4/5 and left lower extremity is 5/5. LABORATORY DATA:  Labs done in the emergency room reveals CBC with a white count of 4.9, hemoglobin is 11, hematocrit 34.3, platelet count is 717,787. Chemistry reveal sodium 135, potassium 4.5, chloride is 102, bicarb is 27, gap of 6, glucose is 110. BUN is 13, creatinine 0.91. Magnesium is 2, bilirubin 0.4, protein is 8.9, albumin 3.8, globulin is 5.1. ALT 46, AST 37, alk phos is 101. CT head showed significant small vessel ischemic changes, otherwise unremarkable. ASSESSMENT AND PLAN:      The patient is a 65-year-old gentleman who is being admitted for bilateral limb weakness:    1. Guillain-barre syndrome. Electromyography and nerve conduction velocity are consistent with acquired sural sparing polyradiculoneuropathy. We will consult Neurology and start him on IVIG. 2.  Consult Neurology. 3.  Consult physiotherapy.   4.  We will check for stool studies however, clinically does not seems like that he had Campylobacter . 5. History of ileus. We will get KUB. Exact etiology of ileus is not clear. Previous CT was  suggestive of ileus. 6.  History of hypertension. We will continue current regimen. 7.  History of enlarged prostate. We will continue current regimen. 8.  History of gout. Continue allopurinol. 9.  Deep vein thrombosis prophylaxis.       Williams Gutierrez MD      [id number]/  D:  09/09/2021 16:57  T:  09/09/2021 18:34  JOB #:  [id number]

## 2021-09-09 NOTE — LETTER
2021 2:47 PM    Patient:  Cameron Huang   YOB: 1950  Date of Visit: 2021      Dear Rafa Coronel MD  [address] [place] [address]  Via Fax: [address] [place]  [address] [facility] [place]  [address] [facility] [address]  Via Fax: [phone]:      Thank you for referring Mr. Cameron Huang to me for EMG/NCS. EMG/ NCS Report  [facility] - INPATIENT  [address] [place], [address] [place], [address]   Ph: [phone]   FAX: [phone]  Test Date:  2019      Test Date:  2021    Patient: Russel Lozano : 1950 Physician: Dennis Dempsey MD   Sex: Male Height: ' \" Ref Phys: Maxine Gruber MD   ID#: [id number] Weight:  lbs. Technician: Miguel Smith     Patient History / Exam:  Patient is coming for polyneuropathy evaluation. Cameron Huang is a 66 y.o. male who  has a past medical history of Anemia (2021), BPH (benign prostatic hyperplasia), History of gout, HTN (hypertension), Low vitamin D level, and MSSA (methicillin-susceptible Staphylococcus aureus) colonization (2021). who was doing fine after L3- L5 Lower back surgery May 17, 2021 c/o Dr Tres Guthrie and had a follow up with Dr Tres Guthrie Aug 26, 2021. Was complaining of neck pain and weakness of the R UE since July and so was examined by Dr Tres Guthrie who \"twisted his neck\". A few hours after seeing Dr Tres Guthrie, patient noted weakness of the R LE described as tight muscles of the thigh. L leg is okay. Currently, patient noted progressive weakness of both UE, R worse than L with more weakness of the R LE. (+) numbness of finger tips of both hand. Exam: Patient awake, alert, follows commands, clear speech; hearing grossly intact; EOMI, (-) facial asymmetry, tongue midline; Sensory: reduced PP tips of fingers of both hands  Reflexes: 1+ UE, absent knees and ankles; Down going toes  Coordination: Gait: needed assistance in standing up.  Able to stand on toes, but problem with heels on R LE    Motor Exam      Strength   Right neck flexion: 5/5  Left neck flexion: 5/5  Right neck extension: 5/5  Left neck extension: 5/5  Right deltoid: 3/5  Left deltoid: 3/5  Right biceps: 1/5  Left biceps: 1/5  Right triceps: 3/5  Left triceps: 3/5  Right wrist flexion: 1/5  Left wrist flexion: 1/5  Right wrist extension: 1/5  Left wrist extension: 15  Right interossei: 1/5  Left interossei: 2/5  Right iliopsoas: 4/5  Left iliopsoas: 5/5  Right quadriceps: 4/5  Left quadriceps: 5/5  Right hamstrin/5  Left hamstrin/5  Left glutei: 5/5  Right anterior tibial: 5/5  Left anterior tibial: 5/5  Right posterior tibial: 5/5  Left posterior tibial: 5/5  Right peroneal: 5/5  Left peroneal: 5/5  Right gastroc: 5/5  Left gastroc: 5/5        EMG & NCV Findings:  Evaluation of the right Fibular motor nerve showed normal distal onset latency (3.8 ms), normal amplitude (1.9 mV), decreased conduction velocity (B Fib-Ankle, 30 m/s), and normal conduction velocity (Poplt-B Fib, 43 m/s). The right Fibular TA motor nerve showed normal distal onset latency (3.8 ms), normal amplitude (3.7 mV), and normal conduction velocity (Poplit-Fib Head, 53 m/s). The right median motor nerve showed prolonged distal onset latency (4.7 ms), normal amplitude (9.3 mV), and decreased conduction velocity (Elbow-Wrist, 37 m/s). The right tibial motor nerve showed normal distal onset latency (5.3 ms), normal amplitude (2.9 mV), and decreased conduction velocity (Knee-Ankle, 33 m/s). The right ulnar motor nerve showed prolonged distal onset latency (3.4 ms), normal amplitude (11.3 mV), decreased conduction velocity (B Elbow-Wrist, 41 m/s), and decreased conduction velocity (A Elbow-B Elbow, 43 m/s). The right median sensory nerve showed no response (Wrist).   The right radial sensory and the right sural sensory nerves showed normal distal peak latency (R2.6, R4.3 ms) and normal amplitude (R16.6, R5.3 µV).  The right Sup Fibular sensory nerve showed no response (Lower leg). The right ulnar sensory nerve showed prolonged distal peak latency (4.2 ms) and normal amplitude (9.0 µV). F Wave studies indicate that the right tibial F wave has prolonged latency (71.42 ms). The right ulnar F wave has prolonged latency (54.53 ms). H-reflex studies indicate that the right tibial H-reflex has no response. Needle evaluation of the right extensor digitorum brevis and the right deltoid muscles showed slightly increased spontaneous activity and diminished recruitment. The right abductor hallucis, the right medial gastrocnemius, the right vastus lateralis, the right gluteus medius, and the right first dorsal interosseous muscles showed diminished recruitment. The right anterior tibialis and the right triceps muscles showed slightly increased spontaneous activity and recruitment. The right extensor indicis, the right abductor pollicis brevis, and the right biceps muscles showed recruitment. All remaining muscles (as indicated in the following table) showed no evidence of electrical instability. Impression:  ABNORMAL    Extensive electrodiagnostic examination of the right upper and right lower extremities shows the followin) Absent median and superficial peroneal sensory responses. Prolonged right ulnar sensory peak response  2) Prolonged right median and right ulnar onset response with conduction slowing. More than 50% reduction of the right tibail motor response stimulating at the knee concerning for a partial conduction block  Reduced motor amplitude response in the lower extremity  3) Reduced recruitment noted in all limb muscles examined. Active denervation noted in the EDB, Tibialis anterior, Triceps and Deltoid muscles. 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         Mable Epley Jayne Castellanos MD  Diplomate, [address] [place] [address] Board of Psychiatry and Neurology  Diplomate, Neuromuscular Medicine  Diplomate, American Board of Electrodiagnostic Medicine  Director, [address] [place] [address] Accredited Laboratory with Exemplary Status          Nerve Conduction Studies  Anti Sensory Summary Table     Stim Site NR Peak (ms) Norm Peak (ms) P-T Amp (µV) Norm P-T Amp Site1 Site2 Dist (cm)   Right Median Anti Sensory (2nd Digit)  29.7°C   Wrist NR  <4  >13 Wrist 2nd Digit 14.0   Right Radial Anti Sensory (Base 1st Digit)  31°C   Wrist    2.6 <2.8 16.6 >11 Wrist Base 1st Digit 10.0   Right Sup Fibular Anti Sensory (Lat ankle)  31.1°C   Lower leg NR  <4.6  >4 Lower leg Lat ankle 10.0   Right Sural Anti Sensory (Lat Mall)  31.4°C   Calf    4.3 <4.5 5.3 >4.0 Calf Lat Mall 14.0   Right Ulnar Anti Sensory (5th Digit)  31.3°C   Wrist    4.2 <4.0 9.0 >9 Wrist 5th Digit 14.0     Motor Summary Table     Stim Site NR Onset (ms) Norm Onset (ms) O-P Amp (mV) Norm O-P Amp Amp (Prev) (%) Site1 Site2 Dist (cm) Naveen (m/s) Norm Naveen (m/s)   Right Fibular Motor (Ext Dig Brev)  30.9°C   Ankle    3.8 <6.5 1.9 >1.1 100.0 Ankle Ext Dig Brev 8.0     B Fib    14.1  1.6  84.2 B Fib Ankle 31.0 30 >38   Poplt    16.4  1.3  81.3 Poplt B Fib 10.0 43 >42   Right Fibular TA Motor (Tib Ant)  22.9°C   Fib Head    3.8 <4.5 3.7 >3.0 100.0 Fib Head Tib Ant 10.0     Poplit    5.7  3.5  94.6 Poplit Fib Head 10.0 53 >40   Right Median Motor (Abd Poll Brev)  30.7°C   Wrist    4.7 <4.5 9.3 >4.1 100.0 Wrist Abd Poll Brev 8.0     Elbow    10.7  8.0  86.0 Elbow Wrist 22.0 37 >49   Right Tibial Motor (Abd Jaramillo Brev)  30.8°C   Ankle    5.3 <6.1 2.9 >1.1 100.0 Ankle Abd Jaramillo Brev 8.0     Knee    16.5  1.3  44.8 Knee Ankle 37.0 33 >39   Right Ulnar Motor (Abd Dig Minimi)  30.2°C   Wrist    3.4 <3.1 11.3 >7.0 100.0 Wrist Abd Dig Minimi 8.0  >50   B Elbow    9.0  9.7  85.8 B Elbow Wrist 23.0 41 >50   A Elbow    11.3  9.1  93.8 A Elbow B Elbow 10.0 43 >50     F Wave Studies     NR F-Lat (ms) Lat Norm (ms) L-R F-Lat (ms) L-R Lat Norm   Right Tibial (Mrkrs) (Abd Hallucis)  30.7°C      71.42 <56  <5.7   Right Ulnar (Mrkrs) (Abd Dig Min)  29.9°C      54.53 <32  <2.5     H Reflex Studies     NR H-Lat (ms) L-R H-Lat (ms) L-R Lat Norm   Right Tibial (Gastroc)  30.6°C   NR   <2.0     EMG     Side Muscle Nerve Root Ins Act Fibs Psw Recrt Duration Amp Poly Comment   Right Ext Dig Brev Dp Br Peron L5, S1 Nml 1+ 1+ Reduced Nml Nml Nml    Right AbdHallucis MedPlantar S1-2 Nml Nml Nml Reduced Nml Nml Nml    Right AntTibialis Dp Br Peron L4-5 Nml 1+ 1+ SMU Nml Nml Nml    Right MedGastroc Tibial S1-2 Nml Nml Nml Reduced Nml Nml Nml    Right VastusLat Femoral L2-4 Nml Nml Nml Reduced Nml Nml Nml    Right GluteusMed SupGluteal L4-S1 Nml Nml Nml Reduced Nml Nml Nml    Right Lower Lumb Parasp Rami L5,S1 Nml Nml Nml Nml Nml Nml Nml    Right 1stDorInt Ulnar C8-T1 Nml Nml Nml Reduced Nml Nml Nml    Right ExtIndicis Radial (Post Int) C7-8 Nml Nml Nml SMU Nml Nml Nml    Right Abd Poll Brev Median C8-T1 Nml Nml Nml SMU Nml Nml Nml    Right Biceps Musculocut C5-6 Nml Nml Nml SMU Nml Nml Nml    Right Triceps Radial C6-7-8 Nml Nml 1+ SMU Nml Nml Nml    Right Deltoid Axillary C5-6 Nml Nml 1+ Reduced Nml Nml Nml    Right Lower Cerv Parasp Rami C7,T1 Nml Nml Nml Nml Nml Nml Nml                Nerve Conduction Studies  Anti Sensory Left/Right Comparison     Stim Site L Lat (ms) R Lat (ms) L-R Lat (ms) L Amp (µV) R Amp (µV) L-R Amp (%) Site1 Site2 L Naveen (m/s) R Naveen (m/s) L-R Naveen (m/s)   Median Anti Sensory (2nd Digit)  29.7°C   Wrist       Wrist 2nd Digit      Radial Anti Sensory (Base 1st Digit)  31°C   Wrist  2.0   16.6  Wrist Base 1st Digit  50    Sup Fibular Anti Sensory (Lat ankle)  31.1°C   Lower leg       Lower leg Lat ankle      Sural Anti Sensory (Lat Mall)  31.4°C   Calf  3.3   5.3  Calf Lat Mall  42    Ulnar Anti Sensory (5th Digit)  31.3°C   Wrist  3.6   9.0  Wrist 5th Digit  39      Motor Left/Right Comparison     Stim Site L Lat (ms) R Lat (ms) L-R Lat (ms) L Amp (mV) R Amp (mV) L-R Amp (%) Site1 Site2 L Naveen (m/s) R Naveen (m/s) L-R Naveen (m/s)   Fibular Motor (Ext Dig Brev)  30.9°C   Ankle  3.8   1.9  Ankle Ext Dig Brev      B Fib  14.1   1.6  B Fib Ankle  30    Poplt  16.4   1.3  Poplt B Fib  43    Fibular TA Motor (Tib Ant)  22.9°C   Fib Head  3.8   3.7  Fib Head Tib Ant      Poplit  5.7   3.5  Poplit Fib Head  53    Median Motor (Abd Poll Brev)  30.7°C   Wrist  4.7   9.3  Wrist Abd Poll Brev      Elbow  10.7   8.0  Elbow Wrist  37    Tibial Motor (Abd Jaramillo Brev)  30.8°C   Ankle  5.3   2.9  Ankle Abd Jaramillo Brev      Knee  16.5   1.3  Knee Ankle  33    Ulnar Motor (Abd Dig Minimi)  30.2°C   Wrist  3.4   11.3  Wrist Abd Dig Minimi      B Elbow  9.0   9.7  B Elbow Wrist  41    A Elbow  11.3   9.1  A Elbow B Elbow  43          Waveforms:

## 2021-09-09 NOTE — PROCEDURES
EMG/ NCS Report  [facility] - INPATIENT  [place]  [place], [address] [place], [address]   Ph: [phone]   FAX: [phone]  Test Date:  2019      Test Date:  2021    Patient: Pam Eaton : 1950 Physician: Jaemy Busby MD   Sex: Male Height: ' \" Ref Phys: Primus Given MD   ID#: [id number] Weight:  lbs. Technician: Danitza Ames     Patient History / Exam:  Patient is coming for polyneuropathy evaluation. Susana Adams is a 66 y.o. male who  has a past medical history of Anemia (2021), BPH (benign prostatic hyperplasia), History of gout, HTN (hypertension), Low vitamin D level, and MSSA (methicillin-susceptible Staphylococcus aureus) colonization (2021). who was doing fine after L3- L5 Lower back surgery May 17, 2021 c/o Dr Sam Haynes and had a follow up with Dr Sam Haynes Aug 26, 2021. Was complaining of neck pain and weakness of the R UE since July and so was examined by Dr Sam Haynes who \"twisted his neck\". A few hours after seeing Dr Sam Haynes, patient noted weakness of the R LE described as tight muscles of the thigh. L leg is okay. Currently, patient noted progressive weakness of both UE, R worse than L with more weakness of the R LE. (+) numbness of finger tips of both hand. Exam: Patient awake, alert, follows commands, clear speech; hearing grossly intact; EOMI, (-) facial asymmetry, tongue midline; Sensory: reduced PP tips of fingers of both hands  Reflexes: 1+ UE, absent knees and ankles; Down going toes  Coordination: Gait: needed assistance in standing up.  Able to stand on toes, but problem with heels on R LE    Motor Exam      Strength   Right neck flexion: 5/5  Left neck flexion: 5/5  Right neck extension: 5/5  Left neck extension: 5/5  Right deltoid: 3/5  Left deltoid: 3/5  Right biceps: 1/5  Left biceps: 1/5  Right triceps: 3/5  Left triceps: 3/5  Right wrist flexion:   Left wrist flexion:   Right wrist extension:   Left wrist extension: 1/5  Right interossei: 1/5  Left interossei: 2/5  Right iliopsoas: 4/5  Left iliopsoas: 5/5  Right quadriceps: 4/5  Left quadriceps: 5/5  Right hamstrin/5  Left hamstrin/5  Left glutei: 5/5  Right anterior tibial: 5/5  Left anterior tibial: 5/5  Right posterior tibial: 5/5  Left posterior tibial: 5/5  Right peroneal: 5/5  Left peroneal: 5/5  Right gastroc: 5/5  Left gastroc: 5/5        EMG & NCV Findings:  Evaluation of the right Fibular motor nerve showed normal distal onset latency (3.8 ms), normal amplitude (1.9 mV), decreased conduction velocity (B Fib-Ankle, 30 m/s), and normal conduction velocity (Poplt-B Fib, 43 m/s). The right Fibular TA motor nerve showed normal distal onset latency (3.8 ms), normal amplitude (3.7 mV), and normal conduction velocity (Poplit-Fib Head, 53 m/s). The right median motor nerve showed prolonged distal onset latency (4.7 ms), normal amplitude (9.3 mV), and decreased conduction velocity (Elbow-Wrist, 37 m/s). The right tibial motor nerve showed normal distal onset latency (5.3 ms), normal amplitude (2.9 mV), and decreased conduction velocity (Knee-Ankle, 33 m/s). The right ulnar motor nerve showed prolonged distal onset latency (3.4 ms), normal amplitude (11.3 mV), decreased conduction velocity (B Elbow-Wrist, 41 m/s), and decreased conduction velocity (A Elbow-B Elbow, 43 m/s). The right median sensory nerve showed no response (Wrist). The right radial sensory and the right sural sensory nerves showed normal distal peak latency (R2.6, R4.3 ms) and normal amplitude (R16.6, R5.3 µV). The right Sup Fibular sensory nerve showed no response (Lower leg). The right ulnar sensory nerve showed prolonged distal peak latency (4.2 ms) and normal amplitude (9.0 µV). F Wave studies indicate that the right tibial F wave has prolonged latency (71.42 ms). The right ulnar F wave has prolonged latency (54.53 ms).       H-reflex studies indicate that the right tibial H-reflex has no response. Needle evaluation of the right extensor digitorum brevis and the right deltoid muscles showed slightly increased spontaneous activity and diminished recruitment. The right abductor hallucis, the right medial gastrocnemius, the right vastus lateralis, the right gluteus medius, and the right first dorsal interosseous muscles showed diminished recruitment. The right anterior tibialis and the right triceps muscles showed slightly increased spontaneous activity and recruitment. The right extensor indicis, the right abductor pollicis brevis, and the right biceps muscles showed recruitment. All remaining muscles (as indicated in the following table) showed no evidence of electrical instability. Impression:  ABNORMAL    Extensive electrodiagnostic examination of the right upper and right lower extremities shows the followin) Absent median and superficial peroneal sensory responses. Prolonged right ulnar sensory peak response  2) Prolonged right median and right ulnar onset response with conduction slowing. More than 50% reduction of the right tibail motor response stimulating at the knee concerning for a partial conduction block  Reduced motor amplitude response in the lower extremity  3) Reduced recruitment noted in all limb muscles examined. Active denervation noted in the EDB, Tibialis anterior, Triceps and Deltoid muscles. 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         Rodríguez Ritchie MD  Diplomate, American Board of Psychiatry and Neurology  Diplomate, Neuromuscular Medicine  Diplomate, American Board of Electrodiagnostic Medicine  Director, [address] [place] [address] Accredited Laboratory with Exemplary Status          Nerve Conduction Studies  Anti Sensory Summary Table     Stim Site NR Peak (ms) Norm Peak (ms) P-T Amp (µV) Norm P-T Amp Site1 Site2 Dist (cm)   Right Median Anti Sensory (2nd Digit)  29.7°C   Wrist NR  <4  >13 Wrist 2nd Digit 14.0   Right Radial Anti Sensory (Base 1st Digit)  31°C   Wrist    2.6 <2.8 16.6 >11 Wrist Base 1st Digit 10.0   Right Sup Fibular Anti Sensory (Lat ankle)  31.1°C   Lower leg NR  <4.6  >4 Lower leg Lat ankle 10.0   Right Sural Anti Sensory (Lat Mall)  31.4°C   Calf    4.3 <4.5 5.3 >4.0 Calf Lat Mall 14.0   Right Ulnar Anti Sensory (5th Digit)  31.3°C   Wrist    4.2 <4.0 9.0 >9 Wrist 5th Digit 14.0     Motor Summary Table     Stim Site NR Onset (ms) Norm Onset (ms) O-P Amp (mV) Norm O-P Amp Amp (Prev) (%) Site1 Site2 Dist (cm) Naveen (m/s) Norm Naveen (m/s)   Right Fibular Motor (Ext Dig Brev)  30.9°C   Ankle    3.8 <6.5 1.9 >1.1 100.0 Ankle Ext Dig Brev 8.0     B Fib    14.1  1.6  84.2 B Fib Ankle 31.0 30 >38   Poplt    16.4  1.3  81.3 Poplt B Fib 10.0 43 >42   Right Fibular TA Motor (Tib Ant)  22.9°C   Fib Head    3.8 <4.5 3.7 >3.0 100.0 Fib Head Tib Ant 10.0     Poplit    5.7  3.5  94.6 Poplit Fib Head 10.0 53 >40   Right Median Motor (Abd Poll Brev)  30.7°C   Wrist    4.7 <4.5 9.3 >4.1 100.0 Wrist Abd Poll Brev 8.0     Elbow    10.7  8.0  86.0 Elbow Wrist 22.0 37 >49   Right Tibial Motor (Abd Jaramillo Brev)  30.8°C   Ankle    5.3 <6.1 2.9 >1.1 100.0 Ankle Abd Jaramillo Brev 8.0     Knee    16.5  1.3  44.8 Knee Ankle 37.0 33 >39   Right Ulnar Motor (Abd Dig Minimi)  30.2°C   Wrist    3.4 <3.1 11.3 >7.0 100.0 Wrist Abd Dig Minimi 8.0  >50   B Elbow    9.0  9.7  85.8 B Elbow Wrist 23.0 41 >50   A Elbow    11.3  9.1  93.8 A Elbow B Elbow 10.0 43 >50     F Wave Studies     NR F-Lat (ms) Lat Norm (ms) L-R F-Lat (ms) L-R Lat Norm   Right Tibial (Mrkrs) (Abd Hallucis)  30.7°C      71.42 <56  <5.7   Right Ulnar (Mrkrs) (Abd Dig Min)  29.9°C      54.53 <32  <2.5     H Reflex Studies     NR H-Lat (ms) L-R H-Lat (ms) L-R Lat Norm   Right Tibial (Gastroc)  30.6°C   NR   <2.0     EMG     Side Muscle Nerve Root Ins Act Fibs Psw Recrt Duration Amp Poly Comment   Right Ext Dig Brev Dp Br Peron L5, S1 Nml 1+ 1+ Reduced Nml Nml Nml    Right AbdHallucis MedPlantar S1-2 Nml Nml Nml Reduced Nml Nml Nml    Right AntTibialis Dp Br Peron L4-5 Nml 1+ 1+ SMU Nml Nml Nml    Right MedGastroc Tibial S1-2 Nml Nml Nml Reduced Nml Nml Nml    Right VastusLat Femoral L2-4 Nml Nml Nml Reduced Nml Nml Nml    Right GluteusMed SupGluteal L4-S1 Nml Nml Nml Reduced Nml Nml Nml    Right Lower Lumb Parasp Rami L5,S1 Nml Nml Nml Nml Nml Nml Nml    Right 1stDorInt Ulnar C8-T1 Nml Nml Nml Reduced Nml Nml Nml    Right ExtIndicis Radial (Post Int) C7-8 Nml Nml Nml SMU Nml Nml Nml    Right Abd Poll Brev Median C8-T1 Nml Nml Nml SMU Nml Nml Nml    Right Biceps Musculocut C5-6 Nml Nml Nml SMU Nml Nml Nml    Right Triceps Radial C6-7-8 Nml Nml 1+ SMU Nml Nml Nml    Right Deltoid Axillary C5-6 Nml Nml 1+ Reduced Nml Nml Nml    Right Lower Cerv Parasp Rami C7,T1 Nml Nml Nml Nml Nml Nml Nml                Nerve Conduction Studies  Anti Sensory Left/Right Comparison     Stim Site L Lat (ms) R Lat (ms) L-R Lat (ms) L Amp (µV) R Amp (µV) L-R Amp (%) Site1 Site2 L Naveen (m/s) R Naveen (m/s) L-R Naveen (m/s)   Median Anti Sensory (2nd Digit)  29.7°C   Wrist       Wrist 2nd Digit      Radial Anti Sensory (Base 1st Digit)  31°C   Wrist  2.0   16.6  Wrist Base 1st Digit  50    Sup Fibular Anti Sensory (Lat ankle)  31.1°C   Lower leg       Lower leg Lat ankle      Sural Anti Sensory (Lat Mall)  31.4°C   Calf  3.3   5.3  Calf Lat Mall  42    Ulnar Anti Sensory (5th Digit)  31.3°C   Wrist  3.6   9.0  Wrist 5th Digit  39      Motor Left/Right Comparison     Stim Site L Lat (ms) R Lat (ms) L-R Lat (ms) L Amp (mV) R Amp (mV) L-R Amp (%) Site1 Site2 L Naveen (m/s) R Naveen (m/s) L-R Naveen (m/s)   Fibular Motor (Ext Dig Brev)  30.9°C   Ankle  3.8   1.9  Ankle Ext Dig Brev      B Fib  14.1   1.6  B Fib Ankle  30    Poplt  16.4   1.3  Poplt B Fib  43    Fibular TA Motor (Tib Ant)  22.9°C   Fib Head  3.8   3.7  Fib Head Tib Ant      Poplit  5.7   3.5 Poplit Fib Head  53    Median Motor (Abd Poll Brev)  30.7°C   Wrist  4.7   9.3  Wrist Abd Poll Brev      Elbow  10.7   8.0  Elbow Wrist  37    Tibial Motor (Abd Jaramillo Brev)  30.8°C   Ankle  5.3   2.9  Ankle Abd Jaramillo Brev      Knee  16.5   1.3  Knee Ankle  33    Ulnar Motor (Abd Dig Minimi)  30.2°C   Wrist  3.4   11.3  Wrist Abd Dig Minimi      B Elbow  9.0   9.7  B Elbow Wrist  41    A Elbow  11.3   9.1  A Elbow B Elbow  43          Waveforms:

## 2021-09-09 NOTE — ED PROVIDER NOTES
HPI   66 y.o. male with past medical history significant for anemia, HTN, BPH, MSSA colonizations, recent low back surgery who presents from neurology office with chief complaint of progressive ascending tingling and weakness in right upper> left upper extremity for 6 weeks. Patient had L3-L5 surgery with Dr [facility] in May and first reported neck issues and arm tingling on one of the follow up visits. Patient admits that in the past two weeks symptoms have progressed and now include lower extremities and he also has SOB. He needs assistance with ambulation and his gait is unsteady. Patient denies recent diarrhea, but admits nausea and vomiting 5-6 weeks ago overlapping with onset of CC. Denies fever, chills, chest pain, increased headaches, sore throat, ear pain, cough, sick contacts, n/v, abd pain, dysuria, hematuria, slurred speech, facial droop, confusion or unilateral weakness. . There are no other acute medical concerns at this time.   Social hx: unremarkable  Significant FMHx: non-contributory  PCP: Mila Garcia MD    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/06/2021    Nares       Past Surgical History:   Procedure Laterality Date    HX CATARACT REMOVAL Right     HX COLONOSCOPY           Family History:   Problem Relation Age of Onset    Anesth Problems Neg Hx     Clotting Disorder Neg Hx        Social History     Socioeconomic History    Marital status:      Spouse name: Not on file    Number of children: Not on file    Years of education: Not on file    Highest education level: Not on file   Occupational History    Not on file   Tobacco Use    Smoking status: Never Smoker    Smokeless tobacco: Never Used   Substance and Sexual Activity    Alcohol use: Yes     Comment: social    Drug use: Never    Sexual activity: Not on file   Other Topics Concern    Not on file   Social History Narrative    Not on file     Social Determinants of Health     Financial Resource Strain:     Difficulty of Paying Living Expenses:    Food Insecurity:     Worried About Running Out of Food in the Last Year:     [address] in the Last Year:    Transportation Needs:     Lack of Transportation (Medical):  Lack of Transportation (Non-Medical):    Physical Activity:     Days of Exercise per Week:     Minutes of Exercise per Session:    Stress:     Feeling of Stress :    Social Connections:     Frequency of Communication with Friends and Family:     Frequency of Social Gatherings with Friends and Family:     Attends Anabaptism Services:     Active Member of Clubs or Organizations:     Attends Club or Organization Meetings:     Marital Status:    Intimate Partner Violence:     Fear of Current or Ex-Partner:     Emotionally Abused:     Physically Abused:     Sexually Abused: ALLERGIES: Morphine    Review of Systems   Constitutional: Positive for activity change. Negative for appetite change and fatigue. HENT: Negative for congestion and sore throat. Eyes: Negative for photophobia and visual disturbance. Respiratory: Positive for shortness of breath. Negative for chest tightness. Cardiovascular: Negative for chest pain and palpitations. Gastrointestinal: Negative for constipation and vomiting. Genitourinary: Negative for difficulty urinating and urgency. Musculoskeletal: Positive for gait problem. Negative for arthralgias, joint swelling and neck pain. Skin: Negative for rash. Neurological: Positive for weakness and numbness. Negative for dizziness and headaches. Psychiatric/Behavioral: Negative for agitation, confusion and suicidal ideas. The patient is not nervous/anxious.         Vitals:    09/09/21 1511   BP: (!) 156/95   Pulse: 85   Resp: 16   Temp: 96.9 °F (36.1 °C)   SpO2: 99%   Weight: 79.4 kg (175 lb)   Height: 5' 7\" (1.702 m)            Physical Exam  Vitals and nursing note reviewed. Constitutional:       Appearance: He is not ill-appearing. HENT:      Head: Normocephalic and atraumatic. Eyes:      General: No scleral icterus. Extraocular Movements: Extraocular movements intact. Cardiovascular:      Rate and Rhythm: Normal rate and regular rhythm. Heart sounds: No murmur heard. Pulmonary:      Effort: Pulmonary effort is normal. No respiratory distress. Breath sounds: Normal breath sounds. No wheezing. Abdominal:      General: Bowel sounds are normal. There is distension. There are no signs of injury. Palpations: Abdomen is soft. Tenderness: There is no abdominal tenderness. Skin:     General: Skin is warm and dry. Capillary Refill: Capillary refill takes less than 2 seconds. Neurological:      General: No focal deficit present. Mental Status: He is alert and oriented to person, place, and time. Cranial Nerves: No cranial nerve deficit. Motor: Weakness ( and sensation in R>L hands and forearms) present. Psychiatric:         Mood and Affect: Mood normal. Mood is not anxious or depressed. Behavior: Behavior normal.          [facility]  ED Course as of Sep 09 1632   Thu Sep 09, 2021   1530 Patient seen and evaluated. I was expecting patient to arrive from Dr Diane Haji office, as I received a sign out from him earlier today. Patient's exam and HPI consistent with Dr Diane Haji report and objective findings on EMG. Will obtain basic labs and stool studies and CT head. Will need admission to telemetry for IVIG and close monitoring.     [VY]   1631 Patient admitted to the hospital. Dr Lindy Worley (hospitalist) assuming care. Neurology will see patient tomorrow.      [VY]      ED Course User Index  [VY] Levi Pardo MD       Procedures

## 2021-09-09 NOTE — ED TRIAGE NOTES
Pt sent here for dx of Shaina Adan. Pt has been losing the feeling in his arms and legs over the 6 weeks. Abdominal pain present as well.

## 2021-09-10 ENCOUNTER — APPOINTMENT (OUTPATIENT)
Dept: GENERAL RADIOLOGY | Age: 71
DRG: 096 | End: 2021-09-10
Attending: HOSPITALIST
Payer: MEDICARE

## 2021-09-10 LAB — IGA SERPL-MCNC: 339 MG/DL (ref 70–400)

## 2021-09-10 PROCEDURE — 74018 RADEX ABDOMEN 1 VIEW: CPT

## 2021-09-10 PROCEDURE — 99222 1ST HOSP IP/OBS MODERATE 55: CPT | Performed by: PSYCHIATRY & NEUROLOGY

## 2021-09-10 PROCEDURE — 74011250637 HC RX REV CODE- 250/637: Performed by: INTERNAL MEDICINE

## 2021-09-10 PROCEDURE — 74011250637 HC RX REV CODE- 250/637: Performed by: FAMILY MEDICINE

## 2021-09-10 PROCEDURE — 65270000029 HC RM PRIVATE

## 2021-09-10 PROCEDURE — 74011250636 HC RX REV CODE- 250/636: Performed by: INTERNAL MEDICINE

## 2021-09-10 PROCEDURE — 74011250636 HC RX REV CODE- 250/636: Performed by: HOSPITALIST

## 2021-09-10 PROCEDURE — 51798 US URINE CAPACITY MEASURE: CPT

## 2021-09-10 PROCEDURE — 74011250637 HC RX REV CODE- 250/637: Performed by: HOSPITALIST

## 2021-09-10 PROCEDURE — 97116 GAIT TRAINING THERAPY: CPT

## 2021-09-10 PROCEDURE — 97162 PT EVAL MOD COMPLEX 30 MIN: CPT

## 2021-09-10 RX ORDER — DOCUSATE SODIUM 100 MG/1
100 CAPSULE, LIQUID FILLED ORAL 2 TIMES DAILY
Status: DISCONTINUED | OUTPATIENT
Start: 2021-09-10 | End: 2021-09-10

## 2021-09-10 RX ORDER — LOSARTAN POTASSIUM 50 MG/1
100 TABLET ORAL DAILY
Status: DISCONTINUED | OUTPATIENT
Start: 2021-09-10 | End: 2021-09-16 | Stop reason: HOSPADM

## 2021-09-10 RX ORDER — KETOROLAC TROMETHAMINE 30 MG/ML
15 INJECTION, SOLUTION INTRAMUSCULAR; INTRAVENOUS ONCE
Status: COMPLETED | OUTPATIENT
Start: 2021-09-10 | End: 2021-09-10

## 2021-09-10 RX ORDER — FINASTERIDE 5 MG/1
5 TABLET, FILM COATED ORAL DAILY
Status: DISCONTINUED | OUTPATIENT
Start: 2021-09-10 | End: 2021-09-16 | Stop reason: HOSPADM

## 2021-09-10 RX ORDER — CALCIUM CARB/MAGNESIUM CARB 311-232MG
5 TABLET ORAL ONCE
Status: COMPLETED | OUTPATIENT
Start: 2021-09-10 | End: 2021-09-10

## 2021-09-10 RX ORDER — AMLODIPINE BESYLATE 5 MG/1
10 TABLET ORAL DAILY
Status: DISCONTINUED | OUTPATIENT
Start: 2021-09-10 | End: 2021-09-16 | Stop reason: HOSPADM

## 2021-09-10 RX ORDER — DOXAZOSIN 2 MG/1
8 TABLET ORAL DAILY
Status: DISCONTINUED | OUTPATIENT
Start: 2021-09-10 | End: 2021-09-16 | Stop reason: HOSPADM

## 2021-09-10 RX ORDER — ENOXAPARIN SODIUM 100 MG/ML
40 INJECTION SUBCUTANEOUS EVERY 24 HOURS
Status: DISCONTINUED | OUTPATIENT
Start: 2021-09-10 | End: 2021-09-16 | Stop reason: HOSPADM

## 2021-09-10 RX ORDER — SPIRONOLACTONE 25 MG/1
25 TABLET ORAL DAILY
Status: DISCONTINUED | OUTPATIENT
Start: 2021-09-10 | End: 2021-09-13

## 2021-09-10 RX ORDER — POLYETHYLENE GLYCOL 3350 17 G/17G
17 POWDER, FOR SOLUTION ORAL DAILY
Status: DISCONTINUED | OUTPATIENT
Start: 2021-09-10 | End: 2021-09-12

## 2021-09-10 RX ORDER — ALLOPURINOL 300 MG/1
600 TABLET ORAL DAILY
Status: DISCONTINUED | OUTPATIENT
Start: 2021-09-10 | End: 2021-09-16 | Stop reason: HOSPADM

## 2021-09-10 RX ORDER — FACIAL-BODY WIPES
10 EACH TOPICAL ONCE
Status: COMPLETED | OUTPATIENT
Start: 2021-09-10 | End: 2021-09-10

## 2021-09-10 RX ORDER — AMOXICILLIN 250 MG
1 CAPSULE ORAL 2 TIMES DAILY
Status: DISCONTINUED | OUTPATIENT
Start: 2021-09-10 | End: 2021-09-16 | Stop reason: HOSPADM

## 2021-09-10 RX ADMIN — POLYETHYLENE GLYCOL 3350 17 G: 17 POWDER, FOR SOLUTION ORAL at 09:56

## 2021-09-10 RX ADMIN — DOCUSATE SODIUM 50MG AND SENNOSIDES 8.6MG 1 TABLET: 8.6; 5 TABLET, FILM COATED ORAL at 17:08

## 2021-09-10 RX ADMIN — LOSARTAN POTASSIUM 100 MG: 50 TABLET, FILM COATED ORAL at 09:56

## 2021-09-10 RX ADMIN — SPIRONOLACTONE 25 MG: 25 TABLET ORAL at 09:57

## 2021-09-10 RX ADMIN — IMMUNE GLOBULIN (HUMAN) 10 G: 10 INJECTION INTRAVENOUS; SUBCUTANEOUS at 19:01

## 2021-09-10 RX ADMIN — ACETAMINOPHEN 650 MG: 325 TABLET ORAL at 03:31

## 2021-09-10 RX ADMIN — AMLODIPINE BESYLATE 10 MG: 5 TABLET ORAL at 09:56

## 2021-09-10 RX ADMIN — BISACODYL 10 MG: 10 SUPPOSITORY RECTAL at 14:17

## 2021-09-10 RX ADMIN — ENOXAPARIN SODIUM 40 MG: 40 INJECTION SUBCUTANEOUS at 09:57

## 2021-09-10 RX ADMIN — KETOROLAC TROMETHAMINE 15 MG: 30 INJECTION, SOLUTION INTRAMUSCULAR; INTRAVENOUS at 07:25

## 2021-09-10 RX ADMIN — ALLOPURINOL 600 MG: 300 TABLET ORAL at 09:56

## 2021-09-10 RX ADMIN — Medication 5 MG: at 23:15

## 2021-09-10 RX ADMIN — IMMUNE GLOBULIN (HUMAN) 20 G: 10 INJECTION INTRAVENOUS; SUBCUTANEOUS at 17:18

## 2021-09-10 RX ADMIN — FINASTERIDE 5 MG: 5 TABLET, FILM COATED ORAL at 09:56

## 2021-09-10 RX ADMIN — DOXAZOSIN 8 MG: 2 TABLET ORAL at 09:56

## 2021-09-10 RX ADMIN — DOCUSATE SODIUM 100 MG: 100 CAPSULE ORAL at 09:56

## 2021-09-10 RX ADMIN — ACETAMINOPHEN 650 MG: 325 TABLET ORAL at 17:14

## 2021-09-10 NOTE — PROGRESS NOTES
1930  Bedside and Verbal shift change report given to Felicie Baumgarten, RN (oncoming nurse) by Stanford Green RN (offgoing nurse). Report included the following information SBAR, Kardex and Recent Results.

## 2021-09-10 NOTE — CONSULTS
NEUROLOGY CONSULT NOTE    Patient ID:  Sole Kennedy  [id number]  29 y.o.  1950    Date of Consultation:  September 10, 2021    Referring Physician: Dr. 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 Authorizing Provider   losartan (COZAAR) 100 mg tablet Take 100 mg by mouth daily. Indications: high blood pressure    Other, MD Manny   docusate sodium (COLACE) 100 mg capsule Take 1 Capsule by mouth two (2) times a day. 5/26/21   Carolynn Murrell NP   amLODIPine (Norvasc) 10 mg tablet Take 10 mg by mouth daily. Provider, Historical   spironolactone (ALDACTONE) 25 mg tablet Take 25 mg by mouth daily. Provider, Historical   doxazosin (CARDURA) 8 mg tablet Take 8 mg by mouth daily. Provider, Historical   allopurinoL (ZYLOPRIM) 300 mg tablet Take 600 mg by mouth daily. Provider, Historical   finasteride (PROSCAR) 5 mg tablet Take 5 mg by mouth daily. Provider, Historical   vitamin E mixed/tocotrienol (VITAMIN E COMPLEX PO) Take 184 mg by mouth daily. Provider, Historical   garlic 4,385 mg cap Take 1 Tablet by mouth daily. Provider, Historical   multivitamin (Men's Multi-Vitamin) tablet Take 1 Tablet by mouth daily. Provider, Historical   omega 3-dha-epa-fish oil (Fish Oil) 100-160-1,000 mg cap Take 1 Capsule by mouth daily. Provider, Historical     Allergies   Allergen Reactions    Morphine Other (comments)     bloating      Social History     Tobacco Use    Smoking status: Never Smoker    Smokeless tobacco: Never Used   Substance Use Topics    Alcohol use: Yes     Comment: social      Family History   Problem Relation Age of Onset    Anesth Problems Neg Hx     Clotting Disorder Neg Hx         Subjective:      Angelito Herndon is a 66 y.o. male with history of anemia, hypertension, BPH, MSSA colonization and lumbar surgery was admitted from the ER for progressive weakness found to have GBS. Patient has been having progressive weakness especially of the upper extremity right greater than the left for the past 6 weeks with fingertip numbness. Status post lumbar surgery May 2021 and also has been having right lower extremity weakness. Patient was seen by Dr. Mark Barrera (neurology) 9/8/2021. Exam then revealed upper extremity distal greater than proximal weakness, lower extremity proximal weakness, decreased sensory fingertips and lower extremity areflexia. 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 Patient was then sent to the ER to be admitted for IVIG treatment. In the ER blood pressure was 156/95. Lab work-up revealed decreased hemoglobin at 11 and decreased sodium at 135. Head CT without contrast did not reveal any acute process. When seen, patient reports persistent upper extremity profound weakness and less so in lower extremity. Minimal benefit with initial IVIG treatment. Denies any adverse effect. Outside reports reviewed: office notes, ER records, radiology reports, lab reports. Review of Systems:    Pertinent items are noted in HPI.     Objective:     Patient Vitals for the past 8 hrs:   BP Temp Pulse Resp SpO2   09/10/21 0333 (!) 162/96 97.7 °F (36.5 °C) 85 19 96 %   09/10/21 0106 (!) 164/90 97.9 °F (36.6 °C) 84 19 98 % 09/09/21 2317 (!) 175/80 97.5 °F (36.4 °C) 82 20 99 %     PHYSICAL EXAM:    NEUROLOGICAL EXAM:    Appearance: The patient is well developed, well nourished, provides a coherent history and is in no acute distress. Mental Status: Oriented to time, place and person. Fluent, no aphasia or dysarthria. Mood and affect appropriate. Cranial Nerves:   Intact visual fields. SANDRA, EOM's full, no nystagmus, no ptosis. Facial sensation is normal. Corneal reflexes are intact. Facial movement is symmetric. Hearing is normal bilaterally. Palate is midline with normal elevation. Sternocleidomastoid and trapezius muscles are normal. Tongue is midline. Motor:  3/5 proximal UE strength and R>L 3+ to 4- hand intrinsic weakness. LE iliopsoas 4/5 weakness but otherwise 5/5. Normal bulk and tone. .    Reflexes:   Hypo to areflexia. Downgoing toes. Sensory:   Normal to cold but loss to vibration. Gait:  Not tested. Tremor:   No tremor noted. Cerebellar:  Intact DONATO/HTS. Imaging  CT Head: reviewed    Lab Review    Recent Results (from the past 24 hour(s))   CBC WITH AUTOMATED DIFF    Collection Time: 09/09/21  3:29 PM   Result Value Ref Range    WBC 4.9 4.1 - 11.1 K/uL    RBC 3.68 (L) 4.10 - 5.70 M/uL    HGB 11.0 (L) 12.1 - 17.0 g/dL    HCT 34.3 (L) 36.6 - 50.3 %    MCV 93.2 80.0 - 99.0 FL    MCH 29.9 26.0 - 34.0 PG    MCHC 32.1 30.0 - 36.5 g/dL    RDW 15.6 (H) 11.5 - 14.5 %    PLATELET 947 666 - 515 K/uL    MPV 10.0 8.9 - 12.9 FL    NRBC 0.0 0  WBC    ABSOLUTE NRBC 0.00 0.00 - 0.01 K/uL    NEUTROPHILS 63 32 - 75 %    LYMPHOCYTES 23 12 - 49 %    MONOCYTES 10 5 - 13 %    EOSINOPHILS 3 0 - 7 %    BASOPHILS 1 0 - 1 %    IMMATURE GRANULOCYTES 0 0.0 - 0.5 %    ABS. NEUTROPHILS 3.1 1.8 - 8.0 K/UL    ABS. LYMPHOCYTES 1.1 0.8 - 3.5 K/UL    ABS. MONOCYTES 0.5 0.0 - 1.0 K/UL    ABS. EOSINOPHILS 0.1 0.0 - 0.4 K/UL    ABS. BASOPHILS 0.0 0.0 - 0.1 K/UL    ABS. IMM.  GRANS. 0.0 0.00 - 0.04 K/UL    DF AUTOMATED     METABOLIC PANEL, COMPREHENSIVE    Collection Time: 09/09/21  3:29 PM   Result Value Ref Range    Sodium 135 (L) 136 - 145 mmol/L    Potassium 4.5 3.5 - 5.1 mmol/L    Chloride 102 97 - 108 mmol/L    CO2 27 21 - 32 mmol/L    Anion gap 6 5 - 15 mmol/L    Glucose 110 (H) 65 - 100 mg/dL    BUN 13 6 - 20 MG/DL    Creatinine 0.91 0.70 - 1.30 MG/DL    BUN/Creatinine ratio 14 12 - 20      GFR est AA >60 >60 ml/min/1.73m2    GFR est non-AA >60 >60 ml/min/1.73m2    Calcium 9.8 8.5 - 10.1 MG/DL    Bilirubin, total 0.4 0.2 - 1.0 MG/DL    ALT (SGPT) 46 12 - 78 U/L    AST (SGOT) 37 15 - 37 U/L    Alk. phosphatase 101 45 - 117 U/L    Protein, total 8.9 (H) 6.4 - 8.2 g/dL    Albumin 3.8 3.5 - 5.0 g/dL    Globulin 5.1 (H) 2.0 - 4.0 g/dL    A-G Ratio 0.7 (L) 1.1 - 2.2     SAMPLES BEING HELD    Collection Time: 09/09/21  3:29 PM   Result Value Ref Range    SAMPLES BEING HELD SST. RED     COMMENT        Add-on orders for these samples will be processed based on acceptable specimen integrity and analyte stability, which may vary by analyte. MAGNESIUM    Collection Time: 09/09/21  3:29 PM   Result Value Ref Range    Magnesium 2.0 1.6 - 2.4 mg/dL         Assessment:     Active Problems:    Guillain Barré syndrome ([facility] [address] (9/9/2021)      Plan:   Neurological examination reveals significant upper extremity weakness with no antigravity strength of the shoulder girdle muscles and weak hand intrinsics and only minimal iliopsoas lower extremity weakness. Patient is hypo to areflexic. Decreased vibratory sensation. 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 Head CT without contrast did not reveal any acute process. Continue IVIG 0.4 g/kg/day x 5 days. Aggressive PT and OT. Patient will likely need to be transitioned to inpatient rehab after treatment. Thank you for the consult. 60 minutes was spent with this patient.   This included review of his outpatient and inpatient record as summarized, evaluation of the patient, formulation of treatment plan, answering all patient's questions and concerns    This note was created using voice recognition software. Despite editing, there may be syntax errors.

## 2021-09-10 NOTE — PROGRESS NOTES
Reason for Admission:  Guillain Mobile Syndrome                   RUR Score:    12%                Plan for utilizing home health:      Patient has had home health in the past with [facility]    PCP: First and Last name:  Ran Sotomayor MD   Name of Practice:    Are you a current patient: Yes/No: yes   Approximate date of last visit: 3-4 weeks ago   Can you participate in a virtual visit with your PCP: yes                    Current Advanced Directive/Advance Care Plan: Full Code    Healthcare Decision Maker:  Jose Gibbs, daughter - [phone]                        Transition of Care Plan:    CM met with patient to begin discharge planning. Patient lives in [place] in a ranch style house with three steps to enter. He had been living alone and was independent with self-care and driving up until about 3 weeks ago. His daughter has been staying with him for the last two weeks and providing assistance with ADLs. Patient has had home health in the past [facility]. He has a cane and rolling walker at home. His preferred pharmacy is [facility] in [place]. 1. CM will follow and assist with discharge needs  2. Neurology following  3. PT consulted  4. Anticipate discharge to rehab;  CM discussed with patient and his preference would be either [facility] or [facility]. Care Management Interventions  PCP Verified by CM:  Yes  Physical Therapy Consult: Yes  Support Systems: Child(angie)  The Plan for Transition of Care is Related to the Following Treatment Goals : Guillain Mobile Syndrome  The Patient and/or Patient Representative was Provided with a Choice of Provider and Agrees with the Discharge Plan?: Yes  Discharge Location  Discharge Placement: Rehab hospital/unit dennis Clement LCSW

## 2021-09-10 NOTE — PROGRESS NOTES
TRANSFER - OUT REPORT:    Verbal report given to TEODORA Bond(name) on Micah Walker  being transferred to [facility](unit) for routine progression of care       Report consisted of patients Situation, Background, Assessment and   Recommendations(SBAR). Information from the following report(s) was reviewed with the receiving nurse. SBAR, lab results, cardiac rhythm    Lines:   Peripheral IV 09/09/21 Left;Posterior Hand (Active)   Site Assessment Clean, dry, & intact 09/10/21 0112   Phlebitis Assessment 0 09/10/21 0112   Infiltration Assessment 0 09/10/21 0112   Dressing Status Clean, dry, & intact 09/10/21 0112   Dressing Type Transparent;Tape 09/10/21 0112   Hub Color/Line Status Patent; Flushed 09/10/21 0112   Action Taken Open ports on tubing capped 09/10/21 0112   Alcohol Cap Used Yes 09/10/21 0112       Peripheral IV 09/10/21 Left Antecubital (Active)   Site Assessment Clean, dry, & intact 09/10/21 0112   Phlebitis Assessment 0 09/10/21 0112   Infiltration Assessment 0 09/10/21 0112   Dressing Status Clean, dry, & intact 09/10/21 0112   Dressing Type Transparent;Tape 09/10/21 0112   Hub Color/Line Status Patent;Pink; Infusing 09/10/21 0112   Action Taken Open ports on tubing capped 09/10/21 0112   Alcohol Cap Used Yes 09/10/21 0112        Opportunity for questions and clarification was provided.       Patient transported with:   Registered Nurse

## 2021-09-10 NOTE — PROGRESS NOTES
6332: Notified Dr. Mandeep Frye about patient pain being a 8 out of 10. I gave tylenol at [phone] but the patient stated his pain has not got better.  ordered ketorolac IV 15 mg 1 time dose.

## 2021-09-10 NOTE — PROGRESS NOTES
0: Notified Dr. Marvin Young that patient was stating they had a hard time voiding. Upon bladder scanning the amount was 348. Dr. Vannesa Rosenberg ordered to bladder scan the patient in on hour. 9170: Bladder scaned patient and was 487. Notified Dr. Marvin Young about it and he said to keep encouraging patient to urinate.

## 2021-09-10 NOTE — PROGRESS NOTES
Bedside and Verbal shift change report given to Vincenzo Gu (oncoming nurse) by Sandie Kearney (offgoing nurse). Report included the following information SBAR, Kardex, Intake/Output, MAR, Recent Results and Med Rec Status.

## 2021-09-10 NOTE — PROGRESS NOTES
Problem: Mobility Impaired (Adult and Pediatric)  Goal: *Acute Goals and Plan of Care (Insert Text)  Description: FUNCTIONAL STATUS PRIOR TO ADMISSION: Patient was modified independent using a rolling walker for functional mobility. Patient required minimal assistance for basic and instrumental ADLs. HOME SUPPORT PRIOR TO ADMISSION: The patient lived alone with family(dtr and son) to provide assistance. Dtr staying with him for last 3wks. Physical Therapy Goals  Initiated 9/10/2021  1. Patient will move from supine to sit and sit to supine  in bed with supervision/set-up within 7 day(s). 2.  Patient will transfer from bed to chair and chair to bed with minimal assistance/contact guard assist using the least restrictive device within 7 day(s). 3.  Patient will perform sit to stand with minimal assistance/contact guard assist within 7 day(s). 4.  Patient will ambulate with minimal assistance/contact guard assist for 150 feet with the least restrictive device within 7 day(s). 5.  Patient will ascend/descend 2 stairs with 2 handrail(s) with minimal assistance/contact guard assist within 7 day(s). Outcome: Progressing Towards Goal   PHYSICAL THERAPY EVALUATION  Patient: Jennifer Kumar (83 y.o. male)  Date: 9/10/2021  Primary Diagnosis: Guillain Barr[facility] syndrome ([facility] [G61.0]        Precautions:   Fall (RUE/LE weakness)    ASSESSMENT  Based on the objective data described below, the patient presents with admission due to new onset of BUE(R>L)/RLE weakness due to Guillian-[place] Syndrome. Pt lives alone, yet has been requiring assistance with ADL's and gait for last wks due to symptoms. Pt was independent prior to onset. Pt received supine in bed. A&O and cooperative. Demonstrating decreased coordination and weakness per above. Increased weakness with UE extensors throughout. Supine to sit with Mod A. Good sitting balance. Needing A with hand placement on RW due to weak grasp.   Sit to stand with Min A with some impulsivity and poor eccentrics. Gait of 48' with noted ataxia wit RW and Mod Ax2. Returned to bed per pt request.  This pt is excellent candidate for IPR. Family likewise requesting. Current Level of Function Impacting Discharge (mobility/balance): Mod Ax1-2/fair    Functional Outcome Measure: The patient scored 11/56 on the barthel outcome measure which is indicative of HIGH fall risk. Other factors to consider for discharge: per above     Patient will benefit from skilled therapy intervention to address the above noted impairments. PLAN :  Recommendations and Planned Interventions: bed mobility training, transfer training, gait training, therapeutic exercises, neuromuscular re-education, edema management/control, patient and family training/education, and therapeutic activities      Frequency/Duration: Patient will be followed by physical therapy:  5 times a week to address goals. Recommendation for discharge: (in order for the patient to meet his/her long term goals)  Therapy 3 hours per day 5-7 days per week    This discharge recommendation:  Has been made in collaboration with the attending provider and/or case management    IF patient discharges home will need the following DME: to be determined (TBD)         SUBJECTIVE:   Patient stated I want to go to rehab. 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    [place]     Past Surgical History:   Procedure Laterality Date    HX CATARACT REMOVAL Right     HX COLONOSCOPY         Personal factors and/or comorbidities impacting plan of care: per above and below    Home Situation  Home Environment: Private residence  # Steps to Enter: 2  Rails to Enter: Yes  Hand Rails : Bilateral  One/Two Story Residence: One story  Living Alone: Yes (dtr staying with him; son too)  Support Systems: Child(angie), Friend/Neighbor  Patient Expects to be Discharged to[de-identified] Rehabilitation facility  Current DME Used/Available at Home: [address], [place], straight, Walker, rolling, [address] chair, Raised toilet seat  Tub or Shower Type: Tub/Shower combination    EXAMINATION/PRESENTATION/DECISION MAKING:   Critical Behavior:  Neurologic State: Alert  Orientation Level: Oriented X4  Cognition: Follows commands     Hearing:     Skin:  IV  Edema: RUE; pillow placed to elevate both UE's  Range Of Motion:  AROM: Within functional limits                       Strength:    Strength: Generally decreased, functional (BUE weakness R>L; RLE weakness yet functional)                    Tone & Sensation:   Tone: Abnormal (Hypotonic)              Sensation: Impaired (RUE)               Coordination:  Coordination: Generally decreased, functional  Vision:      Functional Mobility:  Bed Mobility:  Rolling: Minimum assistance  Supine to Sit: Moderate assistance  Sit to Supine: Minimum assistance  Scooting: Minimum assistance  Transfers:  Sit to Stand: Moderate assistance;Minimum assistance;Assist x2  Stand to Sit: Minimum assistance;Assist x2                       Balance:   Sitting: Intact; High guard  Standing: Impaired; With support  Standing - Static: Good;Constant support  Standing - Dynamic : Fair;Constant support  Ambulation/Gait Training:  Distance (ft): 50 Feet (ft)  Assistive Device: Walker, rolling;Gait belt  Ambulation - Level of Assistance:  Moderate assistance;Assist x2        Gait Abnormalities: Ataxic;Decreased step clearance        Base of Support: Narrowed     Speed/Zandra: Fluctuations  Step Length: Right shortened;Left shortened                Therapeutic Exercises:   Encouraged ankle pumping, SLR's, heel slides    Functional Measure:  Lopez Balance Test:    Sitting to Standin  Standing Unsupported: 2  Sitting with Back Unsupported: 4  Standing to Sittin  Transfers: 1  Standing Unsupported with Eyes Closed: 2  Standing Unsupported with Feet Together: 0  Reach Forward with Outstretched Arm: 0   Object: 0  Turn to Look Over Shoulders: 0  Turn 360 Degrees: 0  Alternate Foot on Step/Stool: 0  Standing Unsupported One Foot in Front: 0  Stand on One Le  Total:          56=Maximum possible score;   0-20=High fall risk  21-40=Moderate fall risk   41-56=Low fall risk               Physical Therapy Evaluation Charge Determination   History Examination Presentation Decision-Making   MEDIUM  Complexity : 1-2 comorbidities / personal factors will impact the outcome/ POC  MEDIUM Complexity : 3 Standardized tests and measures addressing body structure, function, activity limitation and / or participation in recreation  MEDIUM Complexity : Evolving with changing characteristics  Other outcome measures barthel  HIGH       Based on the above components, the patient evaluation is determined to be of the following complexity level: MEDIUM    Pain Rating:  No c/o    Activity Tolerance:   Good and Fair    After treatment patient left in no apparent distress:   Supine in bed, Call bell within reach, Caregiver / family present, and Side rails x 3    COMMUNICATION/EDUCATION:   The patients plan of care was discussed with: Occupational therapist, Registered nurse, and Case management. Fall prevention education was provided and the patient/caregiver indicated understanding., Patient/family have participated as able in goal setting and plan of care. , and Patient/family agree to work toward stated goals and plan of care.     Thank you for this referral.  La Hatch, PT   Time Calculation: 32 mins

## 2021-09-10 NOTE — PROGRESS NOTES
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 On IVIG x 5 days. PT/OT, DC planning. Anticipate IRF    HTN: BP was markedly elevated on admission, now improved. Cont Norvasc, losartan, Aldactone    BPH: cont doxazosin and finasteride     Constipation: start bowel regimen      Total time spent: 35 minutes  Time spent in the care of this patient including reviewing records, discussing with nursing and/or other providers on the treatment team, obtaining history and examining the patient, and discussing treatment plans. Care Plan discussed with: Patient, Nursing Staff and >50% of time spent in counseling and coordination of care    Discussed:  Care Plan and D/C Planning    Prophylaxis:  Lovenox    Disposition:  SAH/Rehab         Subjective:     Chief Complaint:  Follow up weakness    Chart/notes/labs/studies reviewed, patient examined. Feels slightly stronger. No abd pain. Constipated. Objective:       Vitals:        Last 24hrs VS reviewed since prior progress note.  Most recent are:    Visit Vitals  BP (!) 152/88 (BP 1 Location: Right upper arm, BP Patient Position: At rest)   Pulse 80   Temp 98.2 °F (36.8 °C)   Resp 16   Ht 5' 7\" (1.702 m)   Wt 79.4 kg (175 lb)   SpO2 98%   BMI 27.41 kg/m²     SpO2 Readings from Last 6 Encounters:   09/10/21 98%   21 98%   21 99%   21 98%   21 97%   21 98%            Intake/Output Summary (Last 24 hours) at 9/10/2021 1943  Last data filed at 9/10/2021 1631  Gross per 24 hour   Intake 420 ml   Output 200 ml   Net 220 ml Exam:     Physical Exam:    Gen:  Well-developed, well-nourished, in no acute distress. Pleasant   HEENT:  Atraumatic, normocephalic. Sclerae nonicteric, hearing intact to voice  Neck:  Supple, without apparent masses. Resp:  No accessory muscle use, CTAB without wheezes, rales, or rhonchi  Card: RRR, without m/r/g. No LE edema  Abd:  +bowel sounds, soft, NTTP, nondistended. No HSM  Neuro: Face symmetric, speech fluent, follows commands appropriately, ~3/5 BUE strength  Psych:  Alert, oriented x 3.  Good insight     Medications Reviewed: (see below)    Lab Data Reviewed: (see below)    ______________________________________________________________________    Medications:     Current Facility-Administered Medications   Medication Dose Route Frequency    allopurinoL (ZYLOPRIM) tablet 600 mg  600 mg Oral DAILY    amLODIPine (NORVASC) tablet 10 mg  10 mg Oral DAILY    doxazosin (CARDURA) tablet 8 mg  8 mg Oral DAILY    finasteride (PROSCAR) tablet 5 mg  5 mg Oral DAILY    losartan (COZAAR) tablet 100 mg  100 mg Oral DAILY    spironolactone (ALDACTONE) tablet 25 mg  25 mg Oral DAILY    enoxaparin (LOVENOX) injection 40 mg  40 mg SubCUTAneous Q24H    polyethylene glycol (MIRALAX) packet 17 g  17 g Oral DAILY    senna-docusate (PERICOLACE) 8.6-50 mg per tablet 1 Tablet  1 Tablet Oral BID    acetaminophen (TYLENOL) tablet 650 mg  650 mg Oral Q4H PRN    diphenhydrAMINE (BENADRYL) injection 50 mg  50 mg IntraVENous Q4H PRN    immune globulin 10% (GAMUNEX-C) infusion 20 g  20 g IntraVENous ONCE TITR    Followed by    immune globulin 10% (GAMUNEX-C) infusion 10 g  10 g IntraVENous ONCE TITR    [START ON 9/11/2021] immune globulin 10% (GAMUNEX-C) infusion 20 g  20 g IntraVENous ONCE TITR    Followed by   Felicia Kaur ON 9/11/2021] immune globulin 10% (GAMUNEX-C) infusion 10 g  10 g IntraVENous ONCE TITR    [START ON 9/12/2021] immune globulin 10% (GAMUNEX-C) infusion 20 g  20 g IntraVENous ONCE TITR    Followed by   Romel Beckwith ON 9/12/2021] immune globulin 10% (GAMUNEX-C) infusion 10 g  10 g IntraVENous ONCE TITR    [START ON 9/13/2021] immune globulin 10% (GAMUNEX-C) infusion 20 g  20 g IntraVENous ONCE TITR    Followed by   Romel Beckwith ON 9/13/2021] immune globulin 10% (GAMUNEX-C) infusion 10 g  10 g IntraVENous ONCE TITR            Lab Review:     Recent Labs     09/09/21  1529   WBC 4.9   HGB 11.0*   HCT 34.3*        Recent Labs     09/09/21  1529   *   K 4.5      CO2 27   *   BUN 13   CREA 0.91   CA 9.8   MG 2.0   ALB 3.8   ALT 46     No components found for: GLPOC  No results for input(s): PH, PCO2, PO2, HCO3, FIO2 in the last 72 hours. No results for input(s): INR, INREXT in the last 72 hours. No results found for: SDES  Lab Results   Component Value Date/Time    Culture result:  05/06/2021 11:27 AM     MRSA NOT PRESENT. Apparent Staphylococus aureus (not MRSA noted). Culture result:  05/06/2021 11:27 AM     Screening of patient nares for MRSA is for surveillance purposes and, if positive, to facilitate isolation considerations in high risk settings.  It is not intended for automatic decolonization interventions per se as regimens are not sufficiently effective to warrant routine use.              ___________________________________________________    Attending Physician: Chadd Garcia MD

## 2021-09-11 LAB
ALBUMIN SERPL-MCNC: 3.1 G/DL (ref 3.5–5)
ANION GAP SERPL CALC-SCNC: 5 MMOL/L (ref 5–15)
BASOPHILS # BLD: 0 K/UL (ref 0–0.1)
BASOPHILS NFR BLD: 1 % (ref 0–1)
BUN SERPL-MCNC: 14 MG/DL (ref 6–20)
BUN/CREAT SERPL: 15 (ref 12–20)
CALCIUM SERPL-MCNC: 9.6 MG/DL (ref 8.5–10.1)
CHLORIDE SERPL-SCNC: 104 MMOL/L (ref 97–108)
CO2 SERPL-SCNC: 24 MMOL/L (ref 21–32)
CREAT SERPL-MCNC: 0.92 MG/DL (ref 0.7–1.3)
DIFFERENTIAL METHOD BLD: ABNORMAL
EOSINOPHIL # BLD: 0.1 K/UL (ref 0–0.4)
EOSINOPHIL NFR BLD: 4 % (ref 0–7)
ERYTHROCYTE [DISTWIDTH] IN BLOOD BY AUTOMATED COUNT: 15 % (ref 11.5–14.5)
GLUCOSE SERPL-MCNC: 114 MG/DL (ref 65–100)
HCT VFR BLD AUTO: 31.8 % (ref 36.6–50.3)
HGB BLD-MCNC: 10.7 G/DL (ref 12.1–17)
IMM GRANULOCYTES # BLD AUTO: 0 K/UL (ref 0–0.04)
IMM GRANULOCYTES NFR BLD AUTO: 0 % (ref 0–0.5)
LYMPHOCYTES # BLD: 0.8 K/UL (ref 0.8–3.5)
LYMPHOCYTES NFR BLD: 20 % (ref 12–49)
MAGNESIUM SERPL-MCNC: 1.9 MG/DL (ref 1.6–2.4)
MCH RBC QN AUTO: 30.7 PG (ref 26–34)
MCHC RBC AUTO-ENTMCNC: 33.6 G/DL (ref 30–36.5)
MCV RBC AUTO: 91.1 FL (ref 80–99)
MONOCYTES # BLD: 0.6 K/UL (ref 0–1)
MONOCYTES NFR BLD: 14 % (ref 5–13)
NEUTS SEG # BLD: 2.5 K/UL (ref 1.8–8)
NEUTS SEG NFR BLD: 61 % (ref 32–75)
NRBC # BLD: 0 K/UL (ref 0–0.01)
NRBC BLD-RTO: 0 PER 100 WBC
PHOSPHATE SERPL-MCNC: 3.6 MG/DL (ref 2.6–4.7)
PLATELET # BLD AUTO: 301 K/UL (ref 150–400)
PMV BLD AUTO: 9.3 FL (ref 8.9–12.9)
POTASSIUM SERPL-SCNC: 3.7 MMOL/L (ref 3.5–5.1)
RBC # BLD AUTO: 3.49 M/UL (ref 4.1–5.7)
SODIUM SERPL-SCNC: 133 MMOL/L (ref 136–145)
WBC # BLD AUTO: 4.1 K/UL (ref 4.1–11.1)

## 2021-09-11 PROCEDURE — 74011250636 HC RX REV CODE- 250/636: Performed by: HOSPITALIST

## 2021-09-11 PROCEDURE — 94010 BREATHING CAPACITY TEST: CPT

## 2021-09-11 PROCEDURE — 74011250637 HC RX REV CODE- 250/637: Performed by: FAMILY MEDICINE

## 2021-09-11 PROCEDURE — 74011250637 HC RX REV CODE- 250/637: Performed by: INTERNAL MEDICINE

## 2021-09-11 PROCEDURE — 65660000000 HC RM CCU STEPDOWN

## 2021-09-11 PROCEDURE — 83735 ASSAY OF MAGNESIUM: CPT

## 2021-09-11 PROCEDURE — 85025 COMPLETE CBC W/AUTO DIFF WBC: CPT

## 2021-09-11 PROCEDURE — 99233 SBSQ HOSP IP/OBS HIGH 50: CPT | Performed by: PSYCHIATRY & NEUROLOGY

## 2021-09-11 PROCEDURE — 36415 COLL VENOUS BLD VENIPUNCTURE: CPT

## 2021-09-11 PROCEDURE — 97535 SELF CARE MNGMENT TRAINING: CPT

## 2021-09-11 PROCEDURE — 97165 OT EVAL LOW COMPLEX 30 MIN: CPT

## 2021-09-11 PROCEDURE — 74011250637 HC RX REV CODE- 250/637: Performed by: HOSPITALIST

## 2021-09-11 PROCEDURE — 80069 RENAL FUNCTION PANEL: CPT

## 2021-09-11 RX ADMIN — SPIRONOLACTONE 25 MG: 25 TABLET ORAL at 10:16

## 2021-09-11 RX ADMIN — DOCUSATE SODIUM 50MG AND SENNOSIDES 8.6MG 1 TABLET: 8.6; 5 TABLET, FILM COATED ORAL at 10:16

## 2021-09-11 RX ADMIN — ENOXAPARIN SODIUM 40 MG: 40 INJECTION SUBCUTANEOUS at 10:17

## 2021-09-11 RX ADMIN — IMMUNE GLOBULIN (HUMAN) 10 G: 10 INJECTION INTRAVENOUS; SUBCUTANEOUS at 18:41

## 2021-09-11 RX ADMIN — AMLODIPINE BESYLATE 10 MG: 5 TABLET ORAL at 10:16

## 2021-09-11 RX ADMIN — DOCUSATE SODIUM 50MG AND SENNOSIDES 8.6MG 1 TABLET: 8.6; 5 TABLET, FILM COATED ORAL at 17:00

## 2021-09-11 RX ADMIN — POLYETHYLENE GLYCOL 3350 17 G: 17 POWDER, FOR SOLUTION ORAL at 10:17

## 2021-09-11 RX ADMIN — DOXAZOSIN 8 MG: 2 TABLET ORAL at 10:16

## 2021-09-11 RX ADMIN — ACETAMINOPHEN 650 MG: 325 TABLET ORAL at 04:10

## 2021-09-11 RX ADMIN — ACETAMINOPHEN 650 MG: 325 TABLET ORAL at 16:53

## 2021-09-11 RX ADMIN — IMMUNE GLOBULIN (HUMAN) 20 G: 10 INJECTION INTRAVENOUS; SUBCUTANEOUS at 16:55

## 2021-09-11 RX ADMIN — FINASTERIDE 5 MG: 5 TABLET, FILM COATED ORAL at 10:16

## 2021-09-11 RX ADMIN — LOSARTAN POTASSIUM 100 MG: 50 TABLET, FILM COATED ORAL at 10:16

## 2021-09-11 RX ADMIN — ALLOPURINOL 600 MG: 300 TABLET ORAL at 10:16

## 2021-09-11 NOTE — PROGRESS NOTES
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 Planning IVIG x 5 days through . PT/OT, DC planning. Anticipate need for IRF    HTN: BP was markedly elevated on admission, still a little high, but improved. Cont Norvasc, losartan, Aldactone    BPH: cont doxazosin and finasteride     Constipation: likely related to GBS / dysautonomia as pt has constipation and urinary retention. Continue aggressive bowel regimen. Bladder scans as needed    Total time spent: 25 minutes  Time spent in the care of this patient including reviewing records, discussing with nursing and/or other providers on the treatment team, obtaining history and examining the patient, and discussing treatment plans. Care Plan discussed with: Patient, Nursing Staff and >50% of time spent in counseling and coordination of care    Discussed:  Care Plan and D/C Planning    Prophylaxis:  Lovenox    Disposition:  SAH/Rehab         Subjective:     Chief Complaint:  Follow up weakness    Chart/notes/labs/studies reviewed, patient examined. No significant change in upper extremity weakness. Remains constipated as only had little output after enema. Having flatus. No fevers or chills       Objective:       Vitals:        Last 24hrs VS reviewed since prior progress note.  Most recent are:    Visit Vitals  BP (P) 135/76 (BP 1 Location: Left upper arm, BP Patient Position: At rest)   Pulse (P) 96   Temp (P) 98.5 °F (36.9 °C)   Resp (P) 16   Ht 5' 7\" (1.702 m)   Wt 79.4 kg (175 lb)   SpO2 (P) 98%   BMI 27.41 kg/m²     SpO2 Readings from Last 6 Encounters:   09/11/21 (P) 98%   09/08/21 98%   08/28/21 99%   08/18/21 98%   05/26/21 97%   05/06/21 98%            Intake/Output Summary (Last 24 hours) at 9/11/2021 1833  Last data filed at 9/11/2021 1439  Gross per 24 hour   Intake 220 ml   Output 900 ml   Net -680 ml          Exam:     Physical Exam:    Gen:  Well-developed, well-nourished, in no acute distress. Pleasant   HEENT:  Atraumatic, normocephalic. Sclerae nonicteric, hearing intact to voice  Neck:  Supple, without apparent masses. Resp:  No accessory muscle use, CTAB without wheezes, rales, or rhonchi  Card: RRR, without m/r/g. No LE edema  Abd:  +bowel sounds, soft, NTTP, mild distention. Neuro: Face symmetric, speech fluent, follows commands appropriately, ~3/5 BUE strength  Psych:  Alert, oriented x 3.  Good insight     Medications Reviewed: (see below)    Lab Data Reviewed: (see below)    ______________________________________________________________________    Medications:     Current Facility-Administered Medications   Medication Dose Route Frequency    allopurinoL (ZYLOPRIM) tablet 600 mg  600 mg Oral DAILY    amLODIPine (NORVASC) tablet 10 mg  10 mg Oral DAILY    doxazosin (CARDURA) tablet 8 mg  8 mg Oral DAILY    finasteride (PROSCAR) tablet 5 mg  5 mg Oral DAILY    losartan (COZAAR) tablet 100 mg  100 mg Oral DAILY    spironolactone (ALDACTONE) tablet 25 mg  25 mg Oral DAILY    enoxaparin (LOVENOX) injection 40 mg  40 mg SubCUTAneous Q24H    polyethylene glycol (MIRALAX) packet 17 g  17 g Oral DAILY    senna-docusate (PERICOLACE) 8.6-50 mg per tablet 1 Tablet  1 Tablet Oral BID    acetaminophen (TYLENOL) tablet 650 mg  650 mg Oral Q4H PRN    diphenhydrAMINE (BENADRYL) injection 50 mg  50 mg IntraVENous Q4H PRN    immune globulin 10% (GAMUNEX-C) infusion 20 g  20 g IntraVENous ONCE TITR    Followed by    immune globulin 10% (GAMUNEX-C) infusion 10 g  10 g IntraVENous ONCE TITR    [START ON 9/12/2021] immune globulin 10% (GAMUNEX-C) infusion 20 g  20 g IntraVENous ONCE TITR    Followed by   Yuvonne Friend ON 9/12/2021] immune globulin 10% (GAMUNEX-C) infusion 10 g  10 g IntraVENous ONCE TITR    [START ON 9/13/2021] immune globulin 10% (GAMUNEX-C) infusion 20 g  20 g IntraVENous ONCE TITR    Followed by   Yuvonne Friend ON 9/13/2021] immune globulin 10% (GAMUNEX-C) infusion 10 g  10 g IntraVENous ONCE TITR            Lab Review:     Recent Labs     09/11/21  0422 09/09/21  1529   WBC 4.1 4.9   HGB 10.7* 11.0*   HCT 31.8* 34.3*    339     Recent Labs     09/11/21  0422 09/09/21  1529   * 135*   K 3.7 4.5    102   CO2 24 27   * 110*   BUN 14 13   CREA 0.92 0.91   CA 9.6 9.8   MG 1.9 2.0   PHOS 3.6  --    ALB 3.1* 3.8   ALT  --  46     No components found for: GLPOC  No results for input(s): PH, PCO2, PO2, HCO3, FIO2 in the last 72 hours. No results for input(s): INR, INREXT, INREXT in the last 72 hours. No results found for: SDES  Lab Results   Component Value Date/Time    Culture result:  05/06/2021 11:27 AM     MRSA NOT PRESENT. Apparent Staphylococus aureus (not MRSA noted). Culture result:  05/06/2021 11:27 AM     Screening of patient nares for MRSA is for surveillance purposes and, if positive, to facilitate isolation considerations in high risk settings.  It is not intended for automatic decolonization interventions per se as regimens are not sufficiently effective to warrant routine use.              ___________________________________________________    Attending Physician: Heather Hanks MD

## 2021-09-11 NOTE — PROGRESS NOTES
Problem: Self Care Deficits Care Plan (Adult)  Goal: *Acute Goals and Plan of Care (Insert Text)  Description:   FUNCTIONAL STATUS PRIOR TO ADMISSION: Pt was independent with ADLs and mobility up until a few weeks ago. He has experienced progressive decline in function over the past few weeks, and his children have been staying with him to assist with ADLs. HOME SUPPORT: The patient lived alone with family to provide assistance. Occupational Therapy Goals  Initiated 9/11/2021  1. Patient will perform self-feeding with minimal assistance within 7 day(s). 2.  Patient will perform grooming with supervision/set within 7 day(s). 3.  Patient will perform toilet transfers with minimal assistance within 7 day(s). 4.  Patient will perform all aspects of toileting with moderate assistance  within 7 day(s). 5.  Patient will participate in upper extremity therapeutic exercise/activities with minimal assistance for 5 minutes within 7 day(s). 6.  Patient will utilize energy conservation techniques during functional activities with verbal cues within 7 day(s). Outcome: Progressing Towards Goal   OCCUPATIONAL THERAPY EVALUATION  Patient: Jennifer Kumar (47 y.o. 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 At baseline pt lives alone and is I with ADLs and mobility, has required assistance from family over the last few weeks. Pt presented semisupine in bed, A&O x4. He reported improved UE movement today, demo'd full L grasp, light R grasp and unable to fully extend R fingers. Using R hand pt able to wash face with min A (support at elbow).  He transferred supine>sit with mod A, good sitting balance. Stood from EOB with mod A and took a few side steps towards HOB. Returned to semisupine in bed at end of session with needs met. Pt offers excellent participation and is highly motivated to improve their functional performance. Recommend inpatient rehab at discharge. Current Level of Function Impacting Discharge (ADLs/self-care): min-total A ADLs, mod A bed mobility and sit<>stand    Functional Outcome Measure: The patient scored 15/100 on the Barthel Index outcome measure. Other factors to consider for discharge: fall risk, independent baseline, supportive family     Patient will benefit from skilled therapy intervention to address the above noted impairments. PLAN :  Recommendations and Planned Interventions: self care training, functional mobility training, therapeutic exercise, balance training, therapeutic activities, endurance activities, patient education, home safety training, and family training/education    Frequency/Duration: Patient will be followed by occupational therapy 5 times a week to address goals. Recommendation for discharge: (in order for the patient to meet his/her long term goals)  Therapy 3 hours per day 5-7 days per week    This discharge recommendation:  Has been made in collaboration with the attending provider and/or case management    IF patient discharges home will need the following DME: TBD       SUBJECTIVE:   Patient stated I can move my R arm more today.     OBJECTIVE DATA SUMMARY:   HISTORY:   Past Medical History:   Diagnosis Date    Anemia 05/07/2021    BPH (benign prostatic hyperplasia)     History of gout     HTN (hypertension)     Low vitamin D level     MSSA (methicillin-susceptible Staphylococcus aureus) colonization 05/06/2021    [place]     Past Surgical History:   Procedure Laterality Date    HX CATARACT REMOVAL Right     HX COLONOSCOPY         Expanded or extensive additional review of patient history:     Home Situation  Home Environment: Private residence  # Steps to Enter: 2  Rails to Enter: Yes  Hand Rails : Bilateral  One/Two Story Residence: One story  Living Alone: Yes (dtr staying with him; son too)  Support Systems: Child(angie), Friend/Neighbor  Patient Expects to be Discharged to[de-identified] Rehabilitation facility  Current DME Used/Available at Home: Cane, straight, Walker, rolling, Shower chair, Raised toilet seat  Tub or Shower Type: Tub/Shower combination    Hand dominance: Right    EXAMINATION OF PERFORMANCE DEFICITS:  Cognitive/Behavioral Status:  Neurologic State: Alert  Orientation Level: Oriented X4  Cognition: Follows commands  Perception: Appears intact  Perseveration: No perseveration noted  Safety/Judgement: Fall prevention    Vision/Perceptual:              Acuity: Within Defined Limits         Range of Motion:  Full L grasp, weak R grasp and unable to full extend fingers  Demo'd L elbow active flexion and extension and shoulder flexion up to approx 75 degrees. With min A flexed and extended R elbow. AROM: Generally decreased, functional (limited B shoulder AROM)     Strength  Strength: Generally decreased, functional (BUE weakness (R>L), RLE weakness)    Coordination:  Coordination: Generally decreased, functional  Fine Motor Skills-Upper: Left Impaired;Right Impaired    Gross Motor Skills-Upper: Left Impaired;Right Impaired    Tone & Sensation:    Tone: Abnormal  Sensation: Impaired (RUE elbow to fingertips)             Balance:  Sitting: Intact; High guard  Standing: Impaired; With support  Standing - Static: Fair;Constant support  Standing - Dynamic : Fair;Constant support    Functional Mobility and Transfers for ADLs:  Bed Mobility:  Rolling: Minimum assistance  Supine to Sit: Moderate assistance  Sit to Supine: Moderate assistance    Transfers:  Sit to Stand: Minimum assistance;Assist x2  Stand to Sit: Minimum assistance    ADL Assessment:  Feeding:  Moderate assistance    Oral Facial Hygiene/Grooming: Maximum assistance    Bathing: Maximum assistance    Upper Body Dressing: Maximum assistance    Lower Body Dressing: Total assistance    Toileting: Total assistance         ADL Intervention and task modifications:       Grooming  Washing Face: Minimum assistance (with R hand, support at elbow)  Washing Hands: Minimum assistance  Cues: Verbal cues provided;Visual cues provided;Physical assistance       Lower Body Dressing Assistance  Socks: Total assistance (dependent)         Cognitive Retraining  Safety/Judgement: Fall prevention    Functional Measure:  Barthel Index:    Bathin  Bladder: 5  Bowels: 5  Groomin  Dressin  Feedin  Mobility: 0  Stairs: 0  Toilet Use: 0  Transfer (Bed to Chair and Back): 5  Total: 15/100        The Barthel ADL Index: Guidelines  1. The index should be used as a record of what a patient does, not as a record of what a patient could do. 2. The main aim is to establish degree of independence from any help, physical or verbal, however minor and for whatever reason. 3. The need for supervision renders the patient not independent. 4. A patient's performance should be established using the best available evidence. Asking the patient, friends/relatives and nurses are the usual sources, but direct observation and common sense are also important. However direct testing is not needed. 5. Usually the patient's performance over the preceding 24-48 hours is important, but occasionally longer periods will be relevant. 6. Middle categories imply that the patient supplies over 50 per cent of the effort. 7. Use of aids to be independent is allowed. Gustavo Astudillo., Barthel, D.W. (2500). Functional evaluation: the Barthel Index. [facility] [address]. KHURRAM Parrish Edmundo Dubonnet.Ghulam., [place], [address] [place] [address] ().  Measuring the change indisability after inpatient rehabilitation; comparison of the responsiveness of the Barthel Index and Functional Imperial Measure. Journal of Neurology, Neurosurgery, and Psychiatry, 66(4), 515-124. MELONIE Mccoy, LÁZARO Woo, & Sharon Macias M.A. (2004.) Assessment of post-stroke quality of life in cost-effectiveness studies: The usefulness of the Barthel Index and the EuroQoL-5D. Quality of Life Research, 15, 784-52        Occupational Therapy Evaluation Charge Determination   History Examination Decision-Making   LOW Complexity : Brief history review  HIGH Complexity : 5 or more performance deficits relating to physical, cognitive , or psychosocial skils that result in activity limitations and / or participation restrictions MEDIUM Complexity : Patient may present with comorbidities that affect occupational performnce. Miniml to moderate modification of tasks or assistance (eg, physical or verbal ) with assesment(s) is necessary to enable patient to complete evaluation       Based on the above components, the patient evaluation is determined to be of the following complexity level: LOW   Pain Rating:  Pt reported no pain during session    Activity Tolerance:   Good    After treatment patient left in no apparent distress:    Supine in bed, Call bell within reach, Bed / chair alarm activated, Caregiver / family present, and Side rails x 3    COMMUNICATION/EDUCATION:   The patients plan of care was discussed with: Physical therapist and Registered nurse. Home safety education was provided and the patient/caregiver indicated understanding., Patient/family have participated as able in goal setting and plan of care. , and Patient/family agree to work toward stated goals and plan of care. This patients plan of care is appropriate for delegation to \[facility] [place] [facility]\"".     Thank you for this referral.  Sri Clemens OT  Time Calculation: 33 mins

## 2021-09-11 NOTE — PROGRESS NOTES
Neurology - Inpatient Progress Note     Name:   Sanam Pool  MRN:    [id number]  [address] [facility] Date:   9/9/2021    Follow up:   [facility]    Interval History     Day 3 of 5 IVIG (0.4 mg/ kg/ day)    Reviewed Dr Erick Cason consult note from 9-10-21. Pt was evaluated by our colleague Dr Jeannine Squires as outpatient, had EMG which was consistent with Guillain [place] syndrome, referred pt to ER on 9-9-21. Pt got first dose of IVIG that day. Exam at time of Dr Erick Cason consult was as follows: No CN abnormalities, 3/5 proximal upper extremity strength 3+ to 4- intrinsic hand muscle weakness, iliopsoas strength 4/5 but otherwise 5/5 in lower extremities, hypo to areflexia, downgoing toes, normal cold sensation, reduced but absent vibratory sensation    Wife at bedside. Pt says he feels stronger in arms, can move arms better and right leg feels stronger. Worked with PT today and ambulated to bathroom with Son's assistance twice today. When asked if any shortness of breath, he says yes, since his symptoms began, but not any worse during hospital admission. Exam: EOMI, face symmetric, hearing / speech normal, normal visual fields. Symmetric shrug.       RUExt: 3-4/ 5 proximal, 2/5 intrinsic hand muscles  LUExt: 3/5 proximal, 3/5 intrinsic hand muscles  RLE: able to keep leg lifted in air x 15 seconds; on direct muscle testing 4+/5 right hip flexors and 5/5 distally    LLE: 5/5 proximal and distal    Absent patellar reflexes  Intact LT in all exts      Brief ROS: As noted above         Allergies   Allergen Reactions    Morphine Other (comments)     bloating       Current Facility-Administered Medications:     allopurinoL (ZYLOPRIM) tablet 600 mg, 600 mg, Oral, DAILY, Cris Gaming MD, 600 mg at 09/11/21 1016    amLODIPine (NORVASC) tablet 10 mg, 10 mg, Oral, DAILY, Cris Gaming MD, 10 mg at 09/11/21 1016    doxazosin (CARDURA) tablet 8 mg, 8 mg, Oral, DAILY, Cris Gaming MD, 8 mg at 09/11/21 1016    finasteride (PROSCAR) tablet 5 mg, 5 mg, Oral, DAILY, Bea Chandler MD, 5 mg at 09/11/21 1016    losartan (COZAAR) tablet 100 mg, 100 mg, Oral, DAILY, Bea Chandler MD, 100 mg at 09/11/21 1016    spironolactone (ALDACTONE) tablet 25 mg, 25 mg, Oral, DAILY, Bea Chandler MD, 25 mg at 09/11/21 1016    enoxaparin (LOVENOX) injection 40 mg, 40 mg, SubCUTAneous, Q24H, Bea Chandler MD, 40 mg at 09/11/21 1017    polyethylene glycol (MIRALAX) packet 17 g, 17 g, Oral, DAILY, Bea Chandler MD, 17 g at 09/11/21 1017    senna-docusate (PERICOLACE) 8.6-50 mg per tablet 1 Tablet, 1 Tablet, Oral, BID, Anna Dawn MD, 1 Tablet at 09/11/21 1016    acetaminophen (TYLENOL) tablet 650 mg, 650 mg, Oral, Q4H PRN, Erika Azul MD, 650 mg at 09/11/21 0410    diphenhydrAMINE (BENADRYL) injection 50 mg, 50 mg, IntraVENous, Q4H PRN, Erika Azul MD    immune globulin 10% (GAMUNEX-C) infusion 20 g, 20 g, IntraVENous, ONCE TITR **FOLLOWED BY** immune globulin 10% (GAMUNEX-C) infusion 10 g, 10 g, IntraVENous, ONCE Lisa Ponce MD    [START ON 9/12/2021] immune globulin 10% (GAMUNEX-C) infusion 20 g, 20 g, IntraVENous, ONCE TITR **FOLLOWED BY** [START ON 9/12/2021] immune globulin 10% (GAMUNEX-C) infusion 10 g, 10 g, IntraVENous, ONCE Lisa Ponce MD    [START ON 9/13/2021] immune globulin 10% (GAMUNEX-C) infusion 20 g, 20 g, IntraVENous, ONCE TITR **FOLLOWED BY** [START ON 9/13/2021] immune globulin 10% (GAMUNEX-C) infusion 10 g, 10 g, IntraVENous, ONCE TITR, Bea Chandler MD      PMHx: has a past medical history of Anemia (05/07/2021), BPH (benign prostatic hyperplasia), History of gout, HTN (hypertension), Low vitamin D level, and MSSA (methicillin-susceptible Staphylococcus aureus) colonization (05/06/2021). PSHx: has a past surgical history that includes hx colonoscopy and hx cataract removal (Right). Impression / Plan       ICD-10-CM ICD-9-CM   1. Guillain Barré syndrome (HCC)  G61.0 357.0   2. Tingling in extremities  R20.2 782.0   3. Abdominal discomfort  R10.9 789.00   4.  Hypertension, unspecified type  I10 401.9     Guillain [place] syndrome    Continue with IVIG to complete 5 day course  Adding Telemetry to monitor for any GBS related dysrhythmias  Added Respiratory Therapy q6 hr vital capacity  Continue with aggressive PT/ OT and plans for inpatient rehab after discharge    Will continue following      Signed By: Mordecai Schaumann, MD     September 11, 2021

## 2021-09-11 NOTE — PROGRESS NOTES
Patient in bed, seen with TV on last evening 9/10/2021. HOB up, bed low position, bed brakes on. HOB siderails up x2, LLE siderail up x1, bed alarm on. Denied having pain, dizziness, nausea, or trouble breathing. Stated R arm pain 5/10; pain goal 2 or 3 out 10. Stated last BM 9/10/2021, (round, soft abd) and that patient has, \"a little,\" trouble urinating, with, \"my prostrate problem. \" L wrist name bracelet seen; this author placed yellow fall risk bracelet and red allergy bracelet. Yellow nonskid footwear in use. AROM in bed to BLE. Butt skin and bilat heels skin warm, intact, wnl. Pupils brisk reaction to pen light; but unequal, so that L pupil is slightly larger than R pupil. Unequal bilat hand grasp R weaker than L. Healed, closed vertical midline back/lumbar incision to air.

## 2021-09-11 NOTE — PROGRESS NOTES
1930  Bedside and Verbal shift change report given to Zane Luis RN (oncoming nurse) by Crystal Chambers RN (offgoing nurse). Report included the following information SBAR, Kardex, Intake/Output, MAR, Recent Results and Med Rec Status.

## 2021-09-11 NOTE — PROGRESS NOTES
Bedside and Verbal shift change report given to Lorenzo Reyez RN  (oncoming nurse) by Malik Wilks RN (offgoing nurse). Report included the following information SBAR, Kardex, ED Summary, Procedure Summary, Intake/Output, MAR, Recent Results and Med Rec Status.

## 2021-09-11 NOTE — PROGRESS NOTES
Patient and daughter seen during 2000 nursing rounds. Patient requested sleep med. Daughter reported patient has taken melatonin for sleep in the past. (Melatonin not seen on current home med list.) 2124 phone call to Dr. Melina Corey [phone]; report to physician of the aforementioned information. New order: melatonin 3mg po x1 dose read back and verified.

## 2021-09-11 NOTE — PROGRESS NOTES
Received order for Vital Capacity. Patient understood procedure well and put forth 3 good efforts achieving the following results:  2.72L, 2.86L and 2.67L.

## 2021-09-12 ENCOUNTER — APPOINTMENT (OUTPATIENT)
Dept: GENERAL RADIOLOGY | Age: 71
DRG: 096 | End: 2021-09-12
Attending: INTERNAL MEDICINE
Payer: MEDICARE

## 2021-09-12 PROCEDURE — 74011250637 HC RX REV CODE- 250/637: Performed by: HOSPITALIST

## 2021-09-12 PROCEDURE — 77010033678 HC OXYGEN DAILY

## 2021-09-12 PROCEDURE — 74011250637 HC RX REV CODE- 250/637: Performed by: INTERNAL MEDICINE

## 2021-09-12 PROCEDURE — 65660000000 HC RM CCU STEPDOWN

## 2021-09-12 PROCEDURE — 99233 SBSQ HOSP IP/OBS HIGH 50: CPT | Performed by: PSYCHIATRY & NEUROLOGY

## 2021-09-12 PROCEDURE — 74018 RADEX ABDOMEN 1 VIEW: CPT

## 2021-09-12 PROCEDURE — 74011250636 HC RX REV CODE- 250/636: Performed by: HOSPITALIST

## 2021-09-12 PROCEDURE — 74011250637 HC RX REV CODE- 250/637: Performed by: FAMILY MEDICINE

## 2021-09-12 PROCEDURE — 94010 BREATHING CAPACITY TEST: CPT

## 2021-09-12 RX ORDER — POLYETHYLENE GLYCOL 3350 17 G/17G
17 POWDER, FOR SOLUTION ORAL 2 TIMES DAILY
Status: DISCONTINUED | OUTPATIENT
Start: 2021-09-12 | End: 2021-09-16 | Stop reason: HOSPADM

## 2021-09-12 RX ORDER — CALCIUM CARB/MAGNESIUM CARB 311-232MG
5 TABLET ORAL
Status: DISCONTINUED | OUTPATIENT
Start: 2021-09-12 | End: 2021-09-16 | Stop reason: HOSPADM

## 2021-09-12 RX ORDER — HYDRALAZINE HYDROCHLORIDE 20 MG/ML
10 INJECTION INTRAMUSCULAR; INTRAVENOUS
Status: DISCONTINUED | OUTPATIENT
Start: 2021-09-12 | End: 2021-09-16 | Stop reason: HOSPADM

## 2021-09-12 RX ADMIN — POLYETHYLENE GLYCOL 3350 17 G: 17 POWDER, FOR SOLUTION ORAL at 17:07

## 2021-09-12 RX ADMIN — DOCUSATE SODIUM 50MG AND SENNOSIDES 8.6MG 1 TABLET: 8.6; 5 TABLET, FILM COATED ORAL at 17:06

## 2021-09-12 RX ADMIN — IMMUNE GLOBULIN (HUMAN) 20 G: 10 INJECTION INTRAVENOUS; SUBCUTANEOUS at 17:10

## 2021-09-12 RX ADMIN — IMMUNE GLOBULIN (HUMAN) 10 G: 10 INJECTION INTRAVENOUS; SUBCUTANEOUS at 19:00

## 2021-09-12 RX ADMIN — POLYETHYLENE GLYCOL 3350 17 G: 17 POWDER, FOR SOLUTION ORAL at 08:21

## 2021-09-12 RX ADMIN — DOXAZOSIN 8 MG: 2 TABLET ORAL at 08:20

## 2021-09-12 RX ADMIN — FINASTERIDE 5 MG: 5 TABLET, FILM COATED ORAL at 08:20

## 2021-09-12 RX ADMIN — AMLODIPINE BESYLATE 10 MG: 5 TABLET ORAL at 08:21

## 2021-09-12 RX ADMIN — ENOXAPARIN SODIUM 40 MG: 40 INJECTION SUBCUTANEOUS at 08:20

## 2021-09-12 RX ADMIN — ACETAMINOPHEN 650 MG: 325 TABLET ORAL at 08:20

## 2021-09-12 RX ADMIN — LOSARTAN POTASSIUM 100 MG: 50 TABLET, FILM COATED ORAL at 08:20

## 2021-09-12 RX ADMIN — DOCUSATE SODIUM 50MG AND SENNOSIDES 8.6MG 1 TABLET: 8.6; 5 TABLET, FILM COATED ORAL at 08:25

## 2021-09-12 RX ADMIN — ALLOPURINOL 600 MG: 300 TABLET ORAL at 08:20

## 2021-09-12 RX ADMIN — ACETAMINOPHEN 650 MG: 325 TABLET ORAL at 17:07

## 2021-09-12 RX ADMIN — Medication 5 MG: at 22:08

## 2021-09-12 RX ADMIN — SPIRONOLACTONE 25 MG: 25 TABLET ORAL at 08:20

## 2021-09-12 NOTE — PROGRESS NOTES
Neurology - Inpatient Progress Note     Name:   Yulisa Noel  MRN:    [id number]  [facility] Date:   9/9/2021    Follow up:   Karry Alberts Dr Andreas Lanes consult note from 9-10-21. Pt was evaluated by our colleague Dr Ivan Mccrary as outpatient, had EMG which was consistent with Guillain [place] syndrome, referred pt to ER on 9-9-21. Pt got first dose of IVIG that day. Exam at time of Dr Andreas Lanes consult was as follows: No CN abnormalities, 3/5 proximal upper extremity strength 3+ to 4- intrinsic hand muscle weakness, iliopsoas strength 4/5 but otherwise 5/5 in lower extremities, hypo to areflexia, downgoing toes, normal cold sensation, reduced but absent vibratory sensation    Interval History     Day 4 of 5 IVIG (0.4 mg/ kg/ day)    Pt resting in bed. Denies any increasing shortness of breath  Respiratory Therapy has been monitoring his Vital Capacities  Their notes indicate he is doing well, and producing good effort on each test    Exam: EOMI, face symmetric, hearing / speech normal, normal visual fields. Symmetric shrug.       RUExt: 3-4/ 5 proximal, 2/5 intrinsic hand muscles  LUExt: 4/5 proximal, 3/5 intrinsic hand muscles  RLE: able to keep leg lifted in air x 15 seconds; on direct muscle testing 4+ to 5/ 5 right hip flexors and 5/ 5 distally    LLE: 5/5 proximal and distal     Intact LT in all exts      Brief ROS: As noted above         Allergies   Allergen Reactions    Morphine Other (comments)     bloating       Current Facility-Administered Medications:     polyethylene glycol (MIRALAX) packet 17 g, 17 g, Oral, BID, Hubert, Allyn, DO    hydrALAZINE (APRESOLINE) 20 mg/mL injection 10 mg, 10 mg, IntraVENous, Q6H PRN, Hubert, Allyn, DO    allopurinoL (ZYLOPRIM) tablet 600 mg, 600 mg, Oral, DAILY, Kimberli Cabezas MD, 600 mg at 09/12/21 0820    amLODIPine (NORVASC) tablet 10 mg, 10 mg, Oral, DAILY, Kimberli Cabezas MD, 10 mg at 09/12/21 0450    doxazosin (CARDURA) tablet 8 mg, 8 mg, Oral, DAILY, Elizabeth Lawton, Dolores Eubanks MD, 8 mg at 09/12/21 0820    finasteride (PROSCAR) tablet 5 mg, 5 mg, Oral, DAILY, Alyse Aragon MD, 5 mg at 09/12/21 0820    losartan (COZAAR) tablet 100 mg, 100 mg, Oral, DAILY, Alyse Aragon MD, 100 mg at 09/12/21 0820    spironolactone (ALDACTONE) tablet 25 mg, 25 mg, Oral, DAILY, Alyse Aragon MD, 25 mg at 09/12/21 0820    enoxaparin (LOVENOX) injection 40 mg, 40 mg, SubCUTAneous, Q24H, Alyse Aragon MD, 40 mg at 09/12/21 0820    senna-docusate (PERICOLACE) 8.6-50 mg per tablet 1 Tablet, 1 Tablet, Oral, BID, Kathi Real MD, 1 Tablet at 09/12/21 0825    acetaminophen (TYLENOL) tablet 650 mg, 650 mg, Oral, Q4H PRN, Shi Staton MD, 650 mg at 09/12/21 0820    diphenhydrAMINE (BENADRYL) injection 50 mg, 50 mg, IntraVENous, Q4H PRN, Shi Staton MD    immune globulin 10% (GAMUNEX-C) infusion 20 g, 20 g, IntraVENous, ONCE TITR **FOLLOWED BY** immune globulin 10% (GAMUNEX-C) infusion 10 g, 10 g, IntraVENous, ONCE Sarahy Stephens MD    [START ON 9/13/2021] immune globulin 10% (GAMUNEX-C) infusion 20 g, 20 g, IntraVENous, ONCE TITR **FOLLOWED BY** [START ON 9/13/2021] immune globulin 10% (GAMUNEX-C) infusion 10 g, 10 g, IntraVENous, ONCE TITR, Alyse Aragon MD      PMHx: has a past medical history of Anemia (05/07/2021), BPH (benign prostatic hyperplasia), History of gout, HTN (hypertension), Low vitamin D level, and MSSA (methicillin-susceptible Staphylococcus aureus) colonization (05/06/2021). PSHx: has a past surgical history that includes hx colonoscopy and hx cataract removal (Right). Impression / Plan       ICD-10-CM ICD-9-CM   1. Guillain Barré syndrome (HCC)  G61.0 357.0   2. Tingling in extremities  R20.2 782.0   3. Abdominal discomfort  R10.9 789.00   4.  Hypertension, unspecified type  I10 401.9     Guillain [place] syndrome    Continue with IVIG to complete 5 day course   Continue PT/ OT  Will reduce Respiratory Therapy monitoring/ vital capacity checks to 3 times a day while awake  Discussed with Case Management that pt should be ready to go to IP Rehab on 5-10-21 from a neurology standpoint  Will follow up tomorrow      Signed By: Michael Perez MD     September 12, 2021

## 2021-09-12 NOTE — PROGRESS NOTES
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 Planning IVIG x 5 days through . PT/OT, DC planning. Anticipate need for IRF    HTN: BP was markedly elevated on admission, still a little high, but improved. Cont Norvasc, losartan, Aldactone. IV hydralazine PRN. BPH: cont doxazosin and finasteride     Constipation: likely related to GBS / dysautonomia as pt has constipation and urinary retention. Check KUB. Continue aggressive bowel regimen. Bladder scans as needed    Total time spent: 27 minutes  Time spent in the care of this patient including reviewing records, discussing with nursing and/or other providers on the treatment team, obtaining history and examining the patient, and discussing treatment plans. Care Plan discussed with: Patient, Nursing Staff and >50% of time spent in counseling and coordination of care    Discussed:  Care Plan and D/C Planning    Prophylaxis:  Lovenox    Disposition:  SAH/Rehab         Subjective:     Chief Complaint:  Follow up weakness    Chart/notes/labs/studies reviewed, patient examined. Improved upper extremity weakness. Remains constipated, reports some abd discomfort, reports flatus, tolerating diet w/o nausea/vomiting. Objective:       Vitals:        Last 24hrs VS reviewed since prior progress note.  Most recent are:    Visit Vitals  BP (!) 166/80 (BP 1 Location: Right upper arm, BP Patient Position: Sitting)   Pulse 91   Temp 98 °F (36.7 °C)   Resp 17   Ht 5' 7\" (1.702 m)   Wt 79.4 kg (175 lb)   SpO2 98%   BMI 27.41 kg/m²     SpO2 Readings from Last 6 Encounters:   09/12/21 98%   09/08/21 98%   08/28/21 99%   08/18/21 98%   05/26/21 97%   05/06/21 98%            Intake/Output Summary (Last 24 hours) at 9/12/2021 5032  Last data filed at 9/12/2021 0500  Gross per 24 hour   Intake 220 ml   Output 1200 ml   Net -980 ml          Exam:     Physical Exam:    Gen:  Well-developed, well-nourished, in no acute distress. Pleasant   HEENT:  Atraumatic, normocephalic. Sclerae nonicteric, hearing intact to voice  Neck:  Supple, without apparent masses. Resp:  No accessory muscle use, CTAB without wheezes, rales, or rhonchi  Card: RRR, without m/r/g. No LE edema  Abd:  +bowel sounds, soft, NTTP, distended  Neuro: Face symmetric, speech fluent, follows commands appropriately, reduced UE strength   Psych:  Alert, oriented x 3.  Good insight     Medications Reviewed: (see below)    Lab Data Reviewed: (see below)    ______________________________________________________________________    Medications:     Current Facility-Administered Medications   Medication Dose Route Frequency    allopurinoL (ZYLOPRIM) tablet 600 mg  600 mg Oral DAILY    amLODIPine (NORVASC) tablet 10 mg  10 mg Oral DAILY    doxazosin (CARDURA) tablet 8 mg  8 mg Oral DAILY    finasteride (PROSCAR) tablet 5 mg  5 mg Oral DAILY    losartan (COZAAR) tablet 100 mg  100 mg Oral DAILY    spironolactone (ALDACTONE) tablet 25 mg  25 mg Oral DAILY    enoxaparin (LOVENOX) injection 40 mg  40 mg SubCUTAneous Q24H    polyethylene glycol (MIRALAX) packet 17 g  17 g Oral DAILY    senna-docusate (PERICOLACE) 8.6-50 mg per tablet 1 Tablet  1 Tablet Oral BID    acetaminophen (TYLENOL) tablet 650 mg  650 mg Oral Q4H PRN    diphenhydrAMINE (BENADRYL) injection 50 mg  50 mg IntraVENous Q4H PRN    immune globulin 10% (GAMUNEX-C) infusion 20 g  20 g IntraVENous ONCE TITR    Followed by    immune globulin 10% (GAMUNEX-C) infusion 10 g  10 g IntraVENous ONCE TITR    [START ON 9/13/2021] immune globulin 10% (GAMUNEX-C) infusion 20 g  20 g IntraVENous ONCE TITR    Followed by   Kit Saltness ON 9/13/2021] immune globulin 10% (GAMUNEX-C) infusion 10 g  10 g IntraVENous ONCE TITR            Lab Review:     Recent Labs     09/11/21  0422 09/09/21  1529   WBC 4.1 4.9   HGB 10.7* 11.0*   HCT 31.8* 34.3*    339     Recent Labs     09/11/21  0422 09/09/21  1529   * 135*   K 3.7 4.5    102   CO2 24 27   * 110*   BUN 14 13   CREA 0.92 0.91   CA 9.6 9.8   MG 1.9 2.0   PHOS 3.6  --    ALB 3.1* 3.8   ALT  --  46     No components found for: GLPOC  No results for input(s): PH, PCO2, PO2, HCO3, FIO2 in the last 72 hours. No results for input(s): INR, INREXT, INREXT in the last 72 hours. No results found for: SDES  Lab Results   Component Value Date/Time    Culture result:  05/06/2021 11:27 AM     MRSA NOT PRESENT. Apparent Staphylococus aureus (not MRSA noted). Culture result:  05/06/2021 11:27 AM     Screening of patient nares for MRSA is for surveillance purposes and, if positive, to facilitate isolation considerations in high risk settings.  It is not intended for automatic decolonization interventions per se as regimens are not sufficiently effective to warrant routine use.              ___________________________________________________    Attending Physician: Richard Skaggs DO

## 2021-09-12 NOTE — PROGRESS NOTES
2:14 PM    CM notified by Neurology that the patient is cleared by them to discharge to [facility] tomorrow. CM contacted hospitalist as well, there is a possibility he may be ready for discharge on Monday. CM sent a message through [employer] to [facility] to inform and asked them to review for admission. CM will continue to follow.  Giovana Ballesteros

## 2021-09-12 NOTE — PROGRESS NOTES
Bedside and Verbal shift change report given to TEODORA Sawyer (oncoming nurse) by Armando Garsia (offgoing nurse). Report included the following information SBAR, Kardex, Procedure Summary, Intake/Output, MAR, Accordion, Recent Results and Med Rec Status.

## 2021-09-12 NOTE — PROGRESS NOTES
Bedside vital capacity performed with patient sitting up in chair. Good effort with the following results:  2.76L, 2.76L, and 2.44L. Saturation 97% on room air. Heart rate = 97.

## 2021-09-12 NOTE — PROGRESS NOTES
Bedside vital capacity performed with patient sitting up in bed. Good effort with the following results:  2.50L, 2.40L, 2.46L  Saturation on room air was 92% and heart rate 90.

## 2021-09-13 LAB
ALBUMIN SERPL-MCNC: 3 G/DL (ref 3.5–5)
ALBUMIN/GLOB SERPL: 0.5 {RATIO} (ref 1.1–2.2)
ALP SERPL-CCNC: 68 U/L (ref 45–117)
ALT SERPL-CCNC: 35 U/L (ref 12–78)
ANION GAP SERPL CALC-SCNC: 4 MMOL/L (ref 5–15)
AST SERPL-CCNC: 38 U/L (ref 15–37)
BASOPHILS # BLD: 0 K/UL (ref 0–0.1)
BASOPHILS NFR BLD: 0 % (ref 0–1)
BILIRUB SERPL-MCNC: 0.4 MG/DL (ref 0.2–1)
BUN SERPL-MCNC: 12 MG/DL (ref 6–20)
BUN/CREAT SERPL: 13 (ref 12–20)
CALCIUM SERPL-MCNC: 9.3 MG/DL (ref 8.5–10.1)
CHLORIDE SERPL-SCNC: 105 MMOL/L (ref 97–108)
CO2 SERPL-SCNC: 24 MMOL/L (ref 21–32)
CREAT SERPL-MCNC: 0.92 MG/DL (ref 0.7–1.3)
DIFFERENTIAL METHOD BLD: ABNORMAL
EOSINOPHIL # BLD: 0.2 K/UL (ref 0–0.4)
EOSINOPHIL NFR BLD: 5 % (ref 0–7)
ERYTHROCYTE [DISTWIDTH] IN BLOOD BY AUTOMATED COUNT: 15.2 % (ref 11.5–14.5)
GLOBULIN SER CALC-MCNC: 6.2 G/DL (ref 2–4)
GLUCOSE SERPL-MCNC: 113 MG/DL (ref 65–100)
HCT VFR BLD AUTO: 32.2 % (ref 36.6–50.3)
HGB BLD-MCNC: 10.2 G/DL (ref 12.1–17)
IMM GRANULOCYTES # BLD AUTO: 0 K/UL (ref 0–0.04)
IMM GRANULOCYTES NFR BLD AUTO: 0 % (ref 0–0.5)
LYMPHOCYTES # BLD: 0.9 K/UL (ref 0.8–3.5)
LYMPHOCYTES NFR BLD: 19 % (ref 12–49)
MCH RBC QN AUTO: 29.1 PG (ref 26–34)
MCHC RBC AUTO-ENTMCNC: 31.7 G/DL (ref 30–36.5)
MCV RBC AUTO: 91.7 FL (ref 80–99)
MONOCYTES # BLD: 0.6 K/UL (ref 0–1)
MONOCYTES NFR BLD: 12 % (ref 5–13)
NEUTS SEG # BLD: 2.9 K/UL (ref 1.8–8)
NEUTS SEG NFR BLD: 64 % (ref 32–75)
NRBC # BLD: 0 K/UL (ref 0–0.01)
NRBC BLD-RTO: 0 PER 100 WBC
PLATELET # BLD AUTO: 338 K/UL (ref 150–400)
PMV BLD AUTO: 9.4 FL (ref 8.9–12.9)
POTASSIUM SERPL-SCNC: 4.1 MMOL/L (ref 3.5–5.1)
PROT SERPL-MCNC: 9.2 G/DL (ref 6.4–8.2)
RBC # BLD AUTO: 3.51 M/UL (ref 4.1–5.7)
SODIUM SERPL-SCNC: 133 MMOL/L (ref 136–145)
WBC # BLD AUTO: 4.6 K/UL (ref 4.1–11.1)

## 2021-09-13 PROCEDURE — 80053 COMPREHEN METABOLIC PANEL: CPT

## 2021-09-13 PROCEDURE — 36415 COLL VENOUS BLD VENIPUNCTURE: CPT

## 2021-09-13 PROCEDURE — 74011250636 HC RX REV CODE- 250/636: Performed by: HOSPITALIST

## 2021-09-13 PROCEDURE — 99233 SBSQ HOSP IP/OBS HIGH 50: CPT | Performed by: PSYCHIATRY & NEUROLOGY

## 2021-09-13 PROCEDURE — 65660000000 HC RM CCU STEPDOWN

## 2021-09-13 PROCEDURE — 97110 THERAPEUTIC EXERCISES: CPT

## 2021-09-13 PROCEDURE — 94010 BREATHING CAPACITY TEST: CPT

## 2021-09-13 PROCEDURE — 85025 COMPLETE CBC W/AUTO DIFF WBC: CPT

## 2021-09-13 PROCEDURE — 97535 SELF CARE MNGMENT TRAINING: CPT

## 2021-09-13 PROCEDURE — 97116 GAIT TRAINING THERAPY: CPT

## 2021-09-13 PROCEDURE — 74011250637 HC RX REV CODE- 250/637: Performed by: INTERNAL MEDICINE

## 2021-09-13 PROCEDURE — 74011250637 HC RX REV CODE- 250/637: Performed by: HOSPITALIST

## 2021-09-13 RX ORDER — SPIRONOLACTONE 25 MG/1
50 TABLET ORAL DAILY
Status: DISCONTINUED | OUTPATIENT
Start: 2021-09-14 | End: 2021-09-16

## 2021-09-13 RX ADMIN — DOXAZOSIN 8 MG: 2 TABLET ORAL at 10:09

## 2021-09-13 RX ADMIN — IMMUNE GLOBULIN (HUMAN) 20 G: 10 INJECTION INTRAVENOUS; SUBCUTANEOUS at 18:05

## 2021-09-13 RX ADMIN — POLYETHYLENE GLYCOL 3350 17 G: 17 POWDER, FOR SOLUTION ORAL at 10:08

## 2021-09-13 RX ADMIN — FINASTERIDE 5 MG: 5 TABLET, FILM COATED ORAL at 10:09

## 2021-09-13 RX ADMIN — POLYETHYLENE GLYCOL 3350 17 G: 17 POWDER, FOR SOLUTION ORAL at 18:05

## 2021-09-13 RX ADMIN — DOCUSATE SODIUM 50MG AND SENNOSIDES 8.6MG 1 TABLET: 8.6; 5 TABLET, FILM COATED ORAL at 18:04

## 2021-09-13 RX ADMIN — SPIRONOLACTONE 25 MG: 25 TABLET ORAL at 10:09

## 2021-09-13 RX ADMIN — LOSARTAN POTASSIUM 100 MG: 50 TABLET, FILM COATED ORAL at 10:09

## 2021-09-13 RX ADMIN — DOCUSATE SODIUM 50MG AND SENNOSIDES 8.6MG 1 TABLET: 8.6; 5 TABLET, FILM COATED ORAL at 10:09

## 2021-09-13 RX ADMIN — AMLODIPINE BESYLATE 10 MG: 5 TABLET ORAL at 10:09

## 2021-09-13 RX ADMIN — IMMUNE GLOBULIN (HUMAN) 20 G: 10 INJECTION INTRAVENOUS; SUBCUTANEOUS at 20:20

## 2021-09-13 RX ADMIN — ENOXAPARIN SODIUM 40 MG: 40 INJECTION SUBCUTANEOUS at 10:08

## 2021-09-13 RX ADMIN — ALLOPURINOL 600 MG: 300 TABLET ORAL at 10:09

## 2021-09-13 NOTE — PROGRESS NOTES
09/13/21 0909   Bedside Spirometry   SVC (liters) 2.82 Liters   Patient Postioning Sitting up in bed   Patient Effort Well;Reproducable     Best of 3 efforts.

## 2021-09-13 NOTE — PROGRESS NOTES
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 Planning IVIG x 5 days through . PT/OT; plan for IRF    HTN: BP was markedly elevated on admission, still a little high, but improved. Cont Norvasc, losartan, increase Aldactone. IV hydralazine PRN. BPH: cont doxazosin and finasteride     Constipation: likely related to GBS / dysautonomia as pt has constipation and urinary retention. KUB  no obstruction. Continue aggressive bowel regimen. Bladder scans as needed    Total time spent: 27 minutes  Time spent in the care of this patient including reviewing records, discussing with nursing and/or other providers on the treatment team, obtaining history and examining the patient, and discussing treatment plans. Care Plan discussed with: Patient, Nursing Staff and >50% of time spent in counseling and coordination of care    Discussed:  Care Plan and D/C Planning    Prophylaxis:  Lovenox    Disposition:  SAH/Rehab         Subjective:     Chief Complaint:  Follow up weakness    Chart/notes/labs/studies reviewed, patient examined. Improved upper extremity weakness. Having BMs with improvement of constaiption/ abd discomfort, tolerating diet w/o nausea/vomiting. Objective:       Vitals:        Last 24hrs VS reviewed since prior progress note.  Most recent are:    Visit Vitals  BP (!) 148/71   Pulse 97   Temp 98.6 °F (37 °C)   Resp 17   Ht 5' 7\" (1.702 m)   Wt 79.4 kg (175 lb)   SpO2 98%   BMI 27.41 kg/m²     SpO2 Readings from Last 6 Encounters:   21 98%   21 98% 08/28/21 99%   08/18/21 98%   05/26/21 97%   05/06/21 98%            Intake/Output Summary (Last 24 hours) at 9/13/2021 1828  Last data filed at 9/13/2021 1404  Gross per 24 hour   Intake 500 ml   Output 850 ml   Net -350 ml          Exam:     Physical Exam:    Gen:  Well-developed, well-nourished, in no acute distress. Pleasant   HEENT:  Atraumatic, normocephalic. Sclerae nonicteric, hearing intact to voice  Neck:  Supple, without apparent masses. Resp:  No accessory muscle use, CTAB without wheezes, rales, or rhonchi  Card: RRR, without m/r/g. No LE edema  Abd:  +bowel sounds, soft, NTTP, distended  Neuro: Face symmetric, speech fluent, follows commands appropriately, reduced UE strength   Psych:  Alert, oriented x 3.  Good insight     Medications Reviewed: (see below)    Lab Data Reviewed: (see below)    ______________________________________________________________________    Medications:     Current Facility-Administered Medications   Medication Dose Route Frequency    polyethylene glycol (MIRALAX) packet 17 g  17 g Oral BID    hydrALAZINE (APRESOLINE) 20 mg/mL injection 10 mg  10 mg IntraVENous Q6H PRN    melatonin (rapid dissolve) tablet 5 mg  5 mg Oral QHS PRN    allopurinoL (ZYLOPRIM) tablet 600 mg  600 mg Oral DAILY    amLODIPine (NORVASC) tablet 10 mg  10 mg Oral DAILY    doxazosin (CARDURA) tablet 8 mg  8 mg Oral DAILY    finasteride (PROSCAR) tablet 5 mg  5 mg Oral DAILY    losartan (COZAAR) tablet 100 mg  100 mg Oral DAILY    spironolactone (ALDACTONE) tablet 25 mg  25 mg Oral DAILY    enoxaparin (LOVENOX) injection 40 mg  40 mg SubCUTAneous Q24H    senna-docusate (PERICOLACE) 8.6-50 mg per tablet 1 Tablet  1 Tablet Oral BID    acetaminophen (TYLENOL) tablet 650 mg  650 mg Oral Q4H PRN    diphenhydrAMINE (BENADRYL) injection 50 mg  50 mg IntraVENous Q4H PRN    immune globulin 10% (GAMUNEX-C) infusion 20 g  20 g IntraVENous ONCE TITR    Followed by    immune globulin 10% (GAMUNEX-C) infusion 10 g  10 g IntraVENous ONCE TITR            Lab Review:     Recent Labs     09/13/21  0521 09/11/21 0422   WBC 4.6 4.1   HGB 10.2* 10.7*   HCT 32.2* 31.8*    301     Recent Labs     09/13/21  0521 09/11/21 0422   * 133*   K 4.1 3.7    104   CO2 24 24   * 114*   BUN 12 14   CREA 0.92 0.92   CA 9.3 9.6   MG  --  1.9   PHOS  --  3.6   ALB 3.0* 3.1*   ALT 35  --      No components found for: GLPOC  No results for input(s): PH, PCO2, PO2, HCO3, FIO2 in the last 72 hours. No results for input(s): INR, INREXT, INREXT in the last 72 hours. No results found for: SDES  Lab Results   Component Value Date/Time    Culture result:  05/06/2021 11:27 AM     MRSA NOT PRESENT. Apparent Staphylococus aureus (not MRSA noted). Culture result:  05/06/2021 11:27 AM     Screening of patient nares for MRSA is for surveillance purposes and, if positive, to facilitate isolation considerations in high risk settings.  It is not intended for automatic decolonization interventions per se as regimens are not sufficiently effective to warrant routine use.              ___________________________________________________    Attending Physician: Horacio Longoria DO

## 2021-09-13 NOTE — PROGRESS NOTES
Problem: Mobility Impaired (Adult and Pediatric)  Goal: *Acute Goals and Plan of Care (Insert Text)  Description: FUNCTIONAL STATUS PRIOR TO ADMISSION: Patient was modified independent using a rolling walker for functional mobility. Patient required minimal assistance for basic and instrumental ADLs. HOME SUPPORT PRIOR TO ADMISSION: The patient lived alone with family(dtr and son) to provide assistance. Dtr staying with him for last 3wks. Physical Therapy Goals  Initiated 9/10/2021  1. Patient will move from supine to sit and sit to supine  in bed with supervision/set-up within 7 day(s). 2.  Patient will transfer from bed to chair and chair to bed with minimal assistance/contact guard assist using the least restrictive device within 7 day(s). 3.  Patient will perform sit to stand with minimal assistance/contact guard assist within 7 day(s). 4.  Patient will ambulate with minimal assistance/contact guard assist for 150 feet with the least restrictive device within 7 day(s). 5.  Patient will ascend/descend 2 stairs with 2 handrail(s) with minimal assistance/contact guard assist within 7 day(s). 9/13/2021 1626 by Osbaldo Martinez, PT, DPT  Outcome: Progressing Towards Goal  9/13/2021 1604 by Osbaldo Martinez, PT, DPT  Outcome: Progressing Towards Goal   PHYSICAL THERAPY TREATMENT  Patient: Cameron Huang (66 y.o. male)  Date: 9/13/2021  Diagnosis: Guillain Barr[facility] syndrome ([facility] [G61.0] <principal problem not specified>       Precautions: Fall (RUE/LE weakness)  Chart, physical therapy assessment, plan of care and goals were reviewed. ASSESSMENT  Patient continues with skilled PT services and is progressing towards goals. Remains limited by muscle weakness (UE > LE), impaired balance, coordination, and endurance following admission for GBS. Patient is making progress with each session and making gains in overall gait tolerance. Gait quality remains ataxic with a narrow WAGNER and path deviations. Patient challenged with  strength when completing transfers. Continue to recommend discharge to a high intensity rehab setting. Current Level of Function Impacting Discharge (mobility/balance): minimal assist sit to stand, min-mod assist for gait           PLAN :  Patient continues to benefit from skilled intervention to address the above impairments. Continue treatment per established plan of care. to address goals. Recommendation for discharge: (in order for the patient to meet his/her long term goals)  Therapy 3 hours per day 5-7 days per week    This discharge recommendation:  Has been made in collaboration with the attending provider and/or case management    IF patient discharges home will need the following DME: to be determined (TBD)       SUBJECTIVE:   Patient stated I .    OBJECTIVE DATA SUMMARY:   Critical Behavior:  Neurologic State: Alert  Orientation Level: Oriented X4  Cognition: Follows commands  Safety/Judgement: Fall prevention  Functional Mobility Training:  Bed Mobility:  Rolling: Minimum assistance  Supine to Sit: Moderate assistance  Sit to Supine: Moderate assistance           Transfers:  Sit to Stand: Minimum assistance  Stand to Sit: Minimum assistance                             Balance:  Sitting: Intact; With support  Standing: Impaired; With support  Standing - Static: Fair;Constant support  Standing - Dynamic : Fair;Constant support  Ambulation/Gait Training:  Distance (ft): 65 Feet (ft)  Assistive Device: Gait belt;Walker, rolling  Ambulation - Level of Assistance:  Moderate assistance;Minimal assistance        Gait Abnormalities: Ataxic;Decreased step clearance        Base of Support: Narrowed     Speed/Zandra: Fluctuations  Step Length: Left shortened;Right shortened          Therapeutic Exercises:     Pain Rating:      Activity Tolerance:   Good    After treatment patient left in no apparent distress:   Sitting in chair and Call bell within reach    COMMUNICATION/COLLABORATION:   The patients plan of care was discussed with: Registered nurse.      John Blood PT, DPT   Time Calculation: 17 mins

## 2021-09-13 NOTE — PROGRESS NOTES
9/13/2021  Case Management Progress Note    2:58 PM  CJ PIERRE [facility] does not have any [facility] beds at this time, patient would like to try [facility]. Sent in [facility], spoke to liaison Hannah Smith. Plan for admission there tomorrow if all goes to plan. COURTNEY Bellamy    12:09 PM  Patient is 66year old male admitted 9/9 for Guillain [place] syndrome  Patient's RUR is 15% green/low risk for readmission  Chart reviewed--patient discussed at IDR rounds this morning  Patient is likely medically stable for rehab per rounds this morning. There is still no reply from [address] in [employer] so I have left a message for Teri Sanz, their liaison. Will continue to follow and update. Transition of Care Plan   1. Continue medical management  2. Message left for Teri Sanz @ [address]  3. Transportation TBD  4.  CM will continue to follow    COURTNEY Bellamy

## 2021-09-13 NOTE — PROGRESS NOTES
Spiritual Care Partner Volunteer visited patient at [address] [facility] in [address] [place] [address] 1 on 9/13/2021   Documented by:  Alyssa Stanton.      Paging Service: [phone] ([phone])

## 2021-09-13 NOTE — PROGRESS NOTES
3390  Bedside and Verbal shift change report given to Conner Randall RN (oncoming nurse) by Gauri Goldberg RN (offgoing nurse). Report included the following information SBAR, Kardex, Intake/Output and MAR.

## 2021-09-13 NOTE — PROGRESS NOTES
Problem: Self Care Deficits Care Plan (Adult)  Goal: *Acute Goals and Plan of Care (Insert Text)  Description:   FUNCTIONAL STATUS PRIOR TO ADMISSION: Pt was independent with ADLs and mobility up until a few weeks ago. He has experienced progressive decline in function over the past few weeks, and his children have been staying with him to assist with ADLs. HOME SUPPORT: The patient lived alone with family to provide assistance. Occupational Therapy Goals  Initiated 9/11/2021  1. Patient will perform self-feeding with minimal assistance within 7 day(s). 2.  Patient will perform grooming with supervision/set within 7 day(s). 3.  Patient will perform toilet transfers with minimal assistance within 7 day(s). 4.  Patient will perform all aspects of toileting with moderate assistance  within 7 day(s). 5.  Patient will participate in upper extremity therapeutic exercise/activities with minimal assistance for 5 minutes within 7 day(s). 6.  Patient will utilize energy conservation techniques during functional activities with verbal cues within 7 day(s). Outcome: Progressing Towards Goal   OCCUPATIONAL THERAPY TREATMENT  Patient: Nicolasa Coyle (10 y.o. male)  Date: 9/13/2021  Diagnosis: [facility] Barr[facility] syndrome [facility] [G61.0] <principal problem not specified>       Precautions: Fall (RUE/LE weakness)  Chart, occupational therapy assessment, plan of care, and goals were reviewed. ASSESSMENT  Patient continues with skilled OT services and is progressing towards goals. Pt received resting, just returned to bed however willing to get up again for OT. He transfers to chair and worked on AROM for R and L UE, R weaker than left but was able to work on flex/ext both UE's to mouth 10 reps, fatigues after exercise. Pt used left hand to extend R fingers, shoulder shrugs 10 x, assist to extend both UE's. Issued pt built up handle and enabled pt to brush his teeth easier.  Also issued pt green sponge and to increase grasp and strength both hands. Pt returned to bed following tx. Pt is an excellent rehab candidate. Current Level of Function Impacting Discharge (ADLs): Min assist grooming, min assist ADL related transfers    Other factors to consider for discharge:          PLAN :  Patient continues to benefit from skilled intervention to address the above impairments. Continue treatment per established plan of care to address goals. Recommend with staff: Out of bed to chair for ADL's, there ex, there act, meals  Recommend next OT session: Cont towards goals    Recommendation for discharge: (in order for the patient to meet his/her long term goals)  Therapy 3 hours per day 5-7 days per week    This discharge recommendation:  Has not yet been discussed the attending provider and/or case management    IF patient discharges home will need the following DME:        SUBJECTIVE:   Patient stated I do like this.  re: built up handle    OBJECTIVE DATA SUMMARY:   Cognitive/Behavioral Status:  Neurologic State: Alert  Orientation Level: Oriented X4  Cognition: Follows commands             Functional Mobility and Transfers for ADLs:  Bed Mobility:  Rolling: Minimum assistance  Supine to Sit: Moderate assistance  Sit to Supine: Moderate assistance    Transfers:  Sit to Stand: Minimum assistance          Balance:  Sitting: Intact; With support  Standing: Impaired; With support  Standing - Static: Fair;Constant support  Standing - Dynamic : Fair;Constant support    ADL Intervention:       Grooming  Grooming Assistance: Minimum assistance  Position Performed: Seated in chair  Brushing Teeth: Minimum assistance       Therapeutic Exercises:     EXERCISE   Sets   Reps   Active Active Assist   Passive   Comments   Shoulder shrugs 1 10 [x]           []           []              Chest presses 1 10 []           [x]           []              Elbow flex/ext 1 10 [x]           []           []              Finger extension 1 10 [x]           [] []              Finger flex 1 10 [x]           []           []           Green sponge      []           []           []                 []           []           []                 []           []           []                 []           []           []                 []           []           []                 []           []           []                   Activity Tolerance:   Fair    After treatment patient left in no apparent distress:   Supine in bed    COMMUNICATION/COLLABORATION:   The patients plan of care was discussed with: Physical therapist, Occupational therapist, and Registered nurse.      RAHUL Kaufman/L  Time Calculation: 30 mins

## 2021-09-13 NOTE — PROGRESS NOTES
09/13/21 1427   Bedside Spirometry   SVC (liters) 2.68 Liters   Patient Postioning Sitting up in bed   Patient Effort Well;Reproducable

## 2021-09-13 NOTE — PROGRESS NOTES
Bedside Vital Capacity dpne. Pt upright in chair, good effort with nose clips,  Vital Capacity 2.62 liters.

## 2021-09-13 NOTE — PROGRESS NOTES
Neurology - Inpatient Progress Note     Name:   Tena Negrete  MRN:    [id number]  6 [facility] Date:   9/9/2021    Follow up:   Neymar Landry consult note from 9-10-21. 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 Pt got first dose of IVIG that day. Exam at time of Dr Griselda Landry consult was as follows: No CN abnormalities, 3/5 proximal upper extremity strength 3+ to 4- intrinsic hand muscle weakness, iliopsoas strength 4/5 but otherwise 5/5 in lower extremities, hypo to areflexia, downgoing toes, normal cold sensation, reduced but absent vibratory sensation    Interval History     Day 5 of 5 IVIG (0.4 mg/ kg/ day)    Resting in beside chair  PT has just finished working with him  They tell me he walked up/ down mari, mildly unsteady  Pt has no new complaints    Exam: EOMI, face symmetric, hearing / speech normal, normal visual fields. Symmetric shrug.       RUExt: 4+/ 5 proximal, 2/5 intrinsic hand muscles  LUExt: 4/5 proximal, 3/5 intrinsic hand muscles  RLE 4+/5 proximal, 5/5 distal  LLE 5/5     Brief ROS: As noted above         Allergies   Allergen Reactions    Morphine Other (comments)     bloating       Current Facility-Administered Medications:     polyethylene glycol (MIRALAX) packet 17 g, 17 g, Oral, BID, Hubert, Allyn, DO, 17 g at 09/13/21 1008    hydrALAZINE (APRESOLINE) 20 mg/mL injection 10 mg, 10 mg, IntraVENous, Q6H PRN, Hubert, Allyn, DO    melatonin (rapid dissolve) tablet 5 mg, 5 mg, Oral, QHS PRN, Fernando Mccray MD, 5 mg at 09/12/21 2208    allopurinoL (ZYLOPRIM) tablet 600 mg, 600 mg, Oral, DAILY, Ezequiel Dick MD, 600 mg at 09/13/21 1009    amLODIPine (NORVASC) tablet 10 mg, 10 mg, Oral, DAILY, Ezequiel Dick MD, 10 mg at 09/13/21 1009    doxazosin (CARDURA) tablet 8 mg, 8 mg, Oral, DAILY, Ezequiel Dick MD, 8 mg at 09/13/21 1009    finasteride (PROSCAR) tablet 5 mg, 5 mg, Oral, DAILY, Gerda Agosto MD, 5 mg at 09/13/21 1009    losartan (COZAAR) tablet 100 mg, 100 mg, Oral, DAILY, Gerda Agosto MD, 100 mg at 09/13/21 1009    spironolactone (ALDACTONE) tablet 25 mg, 25 mg, Oral, DAILY, Gerda Agosto MD, 25 mg at 09/13/21 1009    enoxaparin (LOVENOX) injection 40 mg, 40 mg, SubCUTAneous, Q24H, Gerda Agosto MD, 40 mg at 09/13/21 1008    senna-docusate (PERICOLACE) 8.6-50 mg per tablet 1 Tablet, 1 Tablet, Oral, BID, Jet Vergara MD, 1 Tablet at 09/13/21 1009    acetaminophen (TYLENOL) tablet 650 mg, 650 mg, Oral, Q4H PRN, Deirdre Mg MD, 650 mg at 09/12/21 1707    diphenhydrAMINE (BENADRYL) injection 50 mg, 50 mg, IntraVENous, Q4H PRN, Deirdre Mg MD    immune globulin 10% (GAMUNEX-C) infusion 20 g, 20 g, IntraVENous, ONCE TITR **FOLLOWED BY** immune globulin 10% (GAMUNEX-C) infusion 10 g, 10 g, IntraVENous, ONCE TITR, Gerda Agosto MD      PMHx: has a past medical history of Anemia (05/07/2021), BPH (benign prostatic hyperplasia), History of gout, HTN (hypertension), Low vitamin D level, and MSSA (methicillin-susceptible Staphylococcus aureus) colonization (05/06/2021). PSHx: has a past surgical history that includes hx colonoscopy and hx cataract removal (Right). Impression / Plan       ICD-10-CM ICD-9-CM   1. Guillain Barré syndrome (HCC)  G61.0 357.0   2. Tingling in extremities  R20.2 782.0   3. Abdominal discomfort  R10.9 789.00   4.  Hypertension, unspecified type  I10 401.9     Guillain [place] syndrome    Complete 5 day course of IVIG today  D/w Case Management yesterday to work on transfer to [facility] today/ tomorrow    Will sign off  Follow up with Dr Kadeem Evans in Neurology clinic in 3 weeks      Signed By: Melody Nieto MD     September 13, 2021

## 2021-09-13 NOTE — PROGRESS NOTES
Bedside and Verbal shift change report given to TEODORA Hester (oncoming nurse) by Edyta Montero (offgoing nurse). Report included the following information SBAR, Kardex, Procedure Summary, Intake/Output, MAR, Accordion, Recent Results and Med Rec Status.

## 2021-09-13 NOTE — PROGRESS NOTES
I have discussed with the patient the rationale for IVIG; its benefits in treating or preventing weakness or death; and its risk which includes mild reactions, rare risk of product  borne infection, or more serious but rare reactions. The patient had an opportunity to ask questions and had agreed to proceed with transfusion of blood components.

## 2021-09-14 ENCOUNTER — APPOINTMENT (OUTPATIENT)
Dept: GENERAL RADIOLOGY | Age: 71
DRG: 096 | End: 2021-09-14
Attending: INTERNAL MEDICINE
Payer: MEDICARE

## 2021-09-14 LAB
ALBUMIN SERPL-MCNC: 2.8 G/DL (ref 3.5–5)
ALBUMIN/GLOB SERPL: 0.4 {RATIO} (ref 1.1–2.2)
ALP SERPL-CCNC: 73 U/L (ref 45–117)
ALT SERPL-CCNC: 37 U/L (ref 12–78)
ANION GAP SERPL CALC-SCNC: 4 MMOL/L (ref 5–15)
AST SERPL-CCNC: 40 U/L (ref 15–37)
BASOPHILS # BLD: 0 K/UL (ref 0–0.1)
BASOPHILS NFR BLD: 0 % (ref 0–1)
BILIRUB SERPL-MCNC: 0.4 MG/DL (ref 0.2–1)
BUN SERPL-MCNC: 14 MG/DL (ref 6–20)
BUN/CREAT SERPL: 15 (ref 12–20)
CALCIUM SERPL-MCNC: 8.8 MG/DL (ref 8.5–10.1)
CHLORIDE SERPL-SCNC: 104 MMOL/L (ref 97–108)
CO2 SERPL-SCNC: 24 MMOL/L (ref 21–32)
COVID-19 RAPID TEST, COVR: NOT DETECTED
CREAT SERPL-MCNC: 0.96 MG/DL (ref 0.7–1.3)
DIFFERENTIAL METHOD BLD: ABNORMAL
EOSINOPHIL # BLD: 0.2 K/UL (ref 0–0.4)
EOSINOPHIL NFR BLD: 4 % (ref 0–7)
ERYTHROCYTE [DISTWIDTH] IN BLOOD BY AUTOMATED COUNT: 15.3 % (ref 11.5–14.5)
GLOBULIN SER CALC-MCNC: 6.4 G/DL (ref 2–4)
GLUCOSE SERPL-MCNC: 106 MG/DL (ref 65–100)
HCT VFR BLD AUTO: 30.6 % (ref 36.6–50.3)
HGB BLD-MCNC: 9.7 G/DL (ref 12.1–17)
IMM GRANULOCYTES # BLD AUTO: 0 K/UL (ref 0–0.04)
IMM GRANULOCYTES NFR BLD AUTO: 0 % (ref 0–0.5)
LYMPHOCYTES # BLD: 0.7 K/UL (ref 0.8–3.5)
LYMPHOCYTES NFR BLD: 16 % (ref 12–49)
MCH RBC QN AUTO: 28.8 PG (ref 26–34)
MCHC RBC AUTO-ENTMCNC: 31.7 G/DL (ref 30–36.5)
MCV RBC AUTO: 90.8 FL (ref 80–99)
MONOCYTES # BLD: 0.6 K/UL (ref 0–1)
MONOCYTES NFR BLD: 12 % (ref 5–13)
NEUTS SEG # BLD: 3.1 K/UL (ref 1.8–8)
NEUTS SEG NFR BLD: 67 % (ref 32–75)
NRBC # BLD: 0 K/UL (ref 0–0.01)
NRBC BLD-RTO: 0 PER 100 WBC
PLATELET # BLD AUTO: 333 K/UL (ref 150–400)
PMV BLD AUTO: 9.5 FL (ref 8.9–12.9)
POTASSIUM SERPL-SCNC: 4 MMOL/L (ref 3.5–5.1)
PROT SERPL-MCNC: 9.2 G/DL (ref 6.4–8.2)
RBC # BLD AUTO: 3.37 M/UL (ref 4.1–5.7)
SODIUM SERPL-SCNC: 132 MMOL/L (ref 136–145)
SOURCE, COVRS: NORMAL
WBC # BLD AUTO: 4.6 K/UL (ref 4.1–11.1)

## 2021-09-14 PROCEDURE — 85025 COMPLETE CBC W/AUTO DIFF WBC: CPT

## 2021-09-14 PROCEDURE — 94010 BREATHING CAPACITY TEST: CPT

## 2021-09-14 PROCEDURE — 99232 SBSQ HOSP IP/OBS MODERATE 35: CPT | Performed by: PSYCHIATRY & NEUROLOGY

## 2021-09-14 PROCEDURE — 74011250637 HC RX REV CODE- 250/637: Performed by: INTERNAL MEDICINE

## 2021-09-14 PROCEDURE — 97110 THERAPEUTIC EXERCISES: CPT | Performed by: OCCUPATIONAL THERAPIST

## 2021-09-14 PROCEDURE — 74018 RADEX ABDOMEN 1 VIEW: CPT

## 2021-09-14 PROCEDURE — 74011250637 HC RX REV CODE- 250/637: Performed by: HOSPITALIST

## 2021-09-14 PROCEDURE — 97535 SELF CARE MNGMENT TRAINING: CPT | Performed by: OCCUPATIONAL THERAPIST

## 2021-09-14 PROCEDURE — 97140 MANUAL THERAPY 1/> REGIONS: CPT | Performed by: OCCUPATIONAL THERAPIST

## 2021-09-14 PROCEDURE — 87635 SARS-COV-2 COVID-19 AMP PRB: CPT

## 2021-09-14 PROCEDURE — 36415 COLL VENOUS BLD VENIPUNCTURE: CPT

## 2021-09-14 PROCEDURE — 80053 COMPREHEN METABOLIC PANEL: CPT

## 2021-09-14 PROCEDURE — 65660000000 HC RM CCU STEPDOWN

## 2021-09-14 PROCEDURE — 97116 GAIT TRAINING THERAPY: CPT

## 2021-09-14 PROCEDURE — 74011250636 HC RX REV CODE- 250/636: Performed by: FAMILY MEDICINE

## 2021-09-14 PROCEDURE — 74011250636 HC RX REV CODE- 250/636: Performed by: HOSPITALIST

## 2021-09-14 RX ORDER — DEXTROMETHORPHAN POLISTIREX 30 MG/5 ML
SUSPENSION, EXTENDED RELEASE 12 HR ORAL
Status: DISCONTINUED | OUTPATIENT
Start: 2021-09-14 | End: 2021-09-16 | Stop reason: HOSPADM

## 2021-09-14 RX ADMIN — ALLOPURINOL 600 MG: 300 TABLET ORAL at 09:08

## 2021-09-14 RX ADMIN — IMMUNE GLOBULIN (HUMAN) 10 G: 10 INJECTION INTRAVENOUS; SUBCUTANEOUS at 02:03

## 2021-09-14 RX ADMIN — DOCUSATE SODIUM 50MG AND SENNOSIDES 8.6MG 1 TABLET: 8.6; 5 TABLET, FILM COATED ORAL at 17:40

## 2021-09-14 RX ADMIN — POLYETHYLENE GLYCOL 3350 17 G: 17 POWDER, FOR SOLUTION ORAL at 17:40

## 2021-09-14 RX ADMIN — DOCUSATE SODIUM 50MG AND SENNOSIDES 8.6MG 1 TABLET: 8.6; 5 TABLET, FILM COATED ORAL at 09:07

## 2021-09-14 RX ADMIN — DOXAZOSIN 8 MG: 2 TABLET ORAL at 09:07

## 2021-09-14 RX ADMIN — AMLODIPINE BESYLATE 10 MG: 5 TABLET ORAL at 09:07

## 2021-09-14 RX ADMIN — POLYETHYLENE GLYCOL 3350 17 G: 17 POWDER, FOR SOLUTION ORAL at 09:07

## 2021-09-14 RX ADMIN — LOSARTAN POTASSIUM 100 MG: 50 TABLET, FILM COATED ORAL at 09:07

## 2021-09-14 RX ADMIN — SPIRONOLACTONE 50 MG: 25 TABLET ORAL at 09:07

## 2021-09-14 RX ADMIN — FINASTERIDE 5 MG: 5 TABLET, FILM COATED ORAL at 09:07

## 2021-09-14 RX ADMIN — DIPHENHYDRAMINE HYDROCHLORIDE 50 MG: 50 INJECTION, SOLUTION INTRAMUSCULAR; INTRAVENOUS at 20:36

## 2021-09-14 RX ADMIN — MINERAL OIL: 100 OIL RECTAL at 11:52

## 2021-09-14 RX ADMIN — ENOXAPARIN SODIUM 40 MG: 40 INJECTION SUBCUTANEOUS at 09:07

## 2021-09-14 NOTE — PROGRESS NOTES
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 S/p IVIG x 5 days. PT/OT; plan for IRF    HTN: wnl. Cont Norvasc, losartan, Aldactone. IV hydralazine PRN. BPH: cont doxazosin and finasteride     Constipation: likely related to GBS / dysautonomia as pt has constipation and urinary retention. KUB  no obstruction. Continue aggressive bowel regimen. Repeat KUB. Bladder scans as needed    Total time spent: 27 minutes  Time spent in the care of this patient including reviewing records, discussing with nursing and/or other providers on the treatment team, obtaining history and examining the patient, and discussing treatment plans. Care Plan discussed with: Patient, Nursing Staff and >50% of time spent in counseling and coordination of care    Discussed:  Care Plan and D/C Planning    Prophylaxis:  Lovenox    Disposition:  SAH/Rehab         Subjective:     Chief Complaint:  Follow up weakness    Chart/notes/labs/studies reviewed, patient examined. Improved upper extremity weakness. Reports constipation, some abd discomfort, no n/v.        Objective:       Vitals:        Last 24hrs VS reviewed since prior progress note.  Most recent are:    Visit Vitals  /75 (BP 1 Location: Left upper arm, BP Patient Position: At rest)   Pulse 98   Temp 98.9 °F (37.2 °C)   Resp 19   Ht 5' 7\" (1.702 m)   Wt 79.4 kg (175 lb)   SpO2 98%   BMI 27.41 kg/m²     SpO2 Readings from Last 6 Encounters:   21 98%   21 98%   21 99%   21 98%   21 97%   21 98% Intake/Output Summary (Last 24 hours) at 9/14/2021 1743  Last data filed at 9/14/2021 1723  Gross per 24 hour   Intake 240 ml   Output    Net 240 ml          Exam:     Physical Exam:    Gen:  Well-developed, well-nourished, in no acute distress. Pleasant   HEENT:  Atraumatic, normocephalic. Sclerae nonicteric, hearing intact to voice  Neck:  Supple, without apparent masses. Resp:  No accessory muscle use, CTAB without wheezes, rales, or rhonchi  Card: RRR, without m/r/g. No LE edema  Abd:  +bowel sounds, soft, NTTP, distended  Neuro: Face symmetric, speech fluent, follows commands appropriately, reduced UE strength   Psych:  Alert, oriented x 3.  Good insight     Medications Reviewed: (see below)    Lab Data Reviewed: (see below)    ______________________________________________________________________    Medications:     Current Facility-Administered Medications   Medication Dose Route Frequency    mineral oil (FLEET) enema   Rectal DAILY PRN    spironolactone (ALDACTONE) tablet 50 mg  50 mg Oral DAILY    polyethylene glycol (MIRALAX) packet 17 g  17 g Oral BID    hydrALAZINE (APRESOLINE) 20 mg/mL injection 10 mg  10 mg IntraVENous Q6H PRN    melatonin (rapid dissolve) tablet 5 mg  5 mg Oral QHS PRN    allopurinoL (ZYLOPRIM) tablet 600 mg  600 mg Oral DAILY    amLODIPine (NORVASC) tablet 10 mg  10 mg Oral DAILY    doxazosin (CARDURA) tablet 8 mg  8 mg Oral DAILY    finasteride (PROSCAR) tablet 5 mg  5 mg Oral DAILY    losartan (COZAAR) tablet 100 mg  100 mg Oral DAILY    enoxaparin (LOVENOX) injection 40 mg  40 mg SubCUTAneous Q24H    senna-docusate (PERICOLACE) 8.6-50 mg per tablet 1 Tablet  1 Tablet Oral BID    acetaminophen (TYLENOL) tablet 650 mg  650 mg Oral Q4H PRN    diphenhydrAMINE (BENADRYL) injection 50 mg  50 mg IntraVENous Q4H PRN            Lab Review:     Recent Labs     09/14/21  0200 09/13/21  0521   WBC 4.6 4.6   HGB 9.7* 10.2*   HCT 30.6* 32.2*    338     Recent Labs 09/14/21  0200 09/13/21  0521   * 133*   K 4.0 4.1    105   CO2 24 24   * 113*   BUN 14 12   CREA 0.96 0.92   CA 8.8 9.3   ALB 2.8* 3.0*   ALT 37 35     No components found for: GLPOC  No results for input(s): PH, PCO2, PO2, HCO3, FIO2 in the last 72 hours. No results for input(s): INR, INREXT, INREXT in the last 72 hours. No results found for: SDES  Lab Results   Component Value Date/Time    Culture result:  05/06/2021 11:27 AM     MRSA NOT PRESENT. Apparent Staphylococus aureus (not MRSA noted). Culture result:  05/06/2021 11:27 AM     Screening of patient nares for MRSA is for surveillance purposes and, if positive, to facilitate isolation considerations in high risk settings.  It is not intended for automatic decolonization interventions per se as regimens are not sufficiently effective to warrant routine use.              ___________________________________________________    Attending Physician: Bing Resendiz DO

## 2021-09-14 NOTE — PROGRESS NOTES
Bedside Vital Capacity dpne.  Sitting up in bed good effort with nose clips,  Vital Capacity 2.61 liters

## 2021-09-14 NOTE — PROGRESS NOTES
Problem: Mobility Impaired (Adult and Pediatric)  Goal: *Acute Goals and Plan of Care (Insert Text)  Description: FUNCTIONAL STATUS PRIOR TO ADMISSION: Patient was modified independent using a rolling walker for functional mobility. Patient required minimal assistance for basic and instrumental ADLs. HOME SUPPORT PRIOR TO ADMISSION: The patient lived alone with family(dtr and son) to provide assistance. Dtr staying with him for last 3wks. Physical Therapy Goals  Initiated 9/10/2021  1. Patient will move from supine to sit and sit to supine  in bed with supervision/set-up within 7 day(s). 2.  Patient will transfer from bed to chair and chair to bed with minimal assistance/contact guard assist using the least restrictive device within 7 day(s). 3.  Patient will perform sit to stand with minimal assistance/contact guard assist within 7 day(s). 4.  Patient will ambulate with minimal assistance/contact guard assist for 150 feet with the least restrictive device within 7 day(s). 5.  Patient will ascend/descend 2 stairs with 2 handrail(s) with minimal assistance/contact guard assist within 7 day(s). Outcome: Progressing Towards Goal   PHYSICAL THERAPY TREATMENT  Patient: Estefania Wong (18 y.o. male)  Date: 9/14/2021  Diagnosis: Guillain Barr[facility] syndrome ([facility] [G61.0] <principal problem not specified>       Precautions: Fall (RUE/LE weakness)  Chart, physical therapy assessment, plan of care and goals were reviewed. ASSESSMENT  Patient continues to  benefit from skilled PT services, is highly motivated, and is progressing towards goals. He remains challenged with bed mobility d/t UE weakness impeding independence with sidelying to sit and requiring moderate assist to complete. Noted increased trunk sway with initial stance and required minimal assist to stabilize. Gait training completed over 2 trials x60' each with a seated rest break between them.  Gait remains ataxic with or without DME, with decreased step clearance, and instability when changing directions. Patient was modified independent for mobility prior to admission and shows significant impairment in coordination strength, and balance (UE > LE). Highly recommend discharge to IP rehab when medically stable. Current Level of Function Impacting Discharge (mobility/balance): minimal assist for gait, moderate assist sidelying to sit             PLAN :  Patient continues to benefit from skilled intervention to address the above impairments. Continue treatment per established plan of care. to address goals. Recommendation for discharge: (in order for the patient to meet his/her long term goals)  Therapy 3 hours per day 5-7 days per week    This discharge recommendation:  Has been made in collaboration with the attending provider and/or case management    IF patient discharges home will need the following DME: to be determined (TBD)       SUBJECTIVE:   Patient stated I am going to do whatever it takes to get better.     OBJECTIVE DATA SUMMARY:   Critical Behavior:  Neurologic State: Alert  Orientation Level: Oriented X4  Cognition: Follows commands  Safety/Judgement: Fall prevention  Functional Mobility Training:  Bed Mobility:  Rolling: Minimum assistance  Supine to Sit: Moderate assistance              Transfers:  Sit to Stand: Minimum assistance  Stand to Sit: Minimum assistance                             Balance:  Sitting: Intact; With support  Standing: Impaired; With support  Standing - Static: Fair;Constant support  Standing - Dynamic : Fair;Constant support  Ambulation/Gait Training:  Distance (ft): 60 Feet (ft) (x2 with seated rest)  Assistive Device: Gait belt;Walker, rolling  Ambulation - Level of Assistance: Minimal assistance        Gait Abnormalities: Ataxic;Decreased step clearance        Base of Support: Narrowed     Speed/Zandra: Fluctuations  Step Length: Left shortened                    Stairs:               Therapeutic Exercises: Seated marching x10 each,  MMT completed for assessment with right > left weakness  Pain Rating:      Activity Tolerance:   Good    After treatment patient left in no apparent distress:   Supine in bed and Call bell within reach    COMMUNICATION/COLLABORATION:   The patients plan of care was discussed with: Registered nurse.      Jas Nettles PT, DPT   Time Calculation: 25 mins

## 2021-09-14 NOTE — PROGRESS NOTES
Problem: Falls - Risk of  Goal: *Absence of Falls  Description: Document Geetha Slater Fall Risk and appropriate interventions in the flowsheet.   Outcome: Progressing Towards Goal  Note: Fall Risk Interventions:            Medication Interventions: Bed/chair exit alarm, Evaluate medications/consider consulting pharmacy, Patient to call before getting OOB    Elimination Interventions: Call light in reach, Patient to call for help with toileting needs

## 2021-09-14 NOTE — PROGRESS NOTES
Problem: Self Care Deficits Care Plan (Adult)  Goal: *Acute Goals and Plan of Care (Insert Text)  Description:   FUNCTIONAL STATUS PRIOR TO ADMISSION: Pt was independent with ADLs and mobility up until a few weeks ago. He has experienced progressive decline in function over the past few weeks, and his children have been staying with him to assist with ADLs. HOME SUPPORT: The patient lived alone with family to provide assistance. Occupational Therapy Goals  Initiated 9/11/2021  1. Patient will perform self-feeding with minimal assistance within 7 day(s). 2.  Patient will perform grooming with supervision/set within 7 day(s). 3.  Patient will perform toilet transfers with minimal assistance within 7 day(s). 4.  Patient will perform all aspects of toileting with moderate assistance  within 7 day(s). 5.  Patient will participate in upper extremity therapeutic exercise/activities with minimal assistance for 5 minutes within 7 day(s). 6.  Patient will utilize energy conservation techniques during functional activities with verbal cues within 7 day(s). Outcome: Progressing Towards Goal       OCCUPATIONAL THERAPY TREATMENT  Patient: Gildardo Walker (89 y.o. male)  Date: 9/14/2021  Diagnosis: Guillain Barré syndrome ([facility] [G61.0] <principal problem not specified>       Precautions: Fall (RUE/LE weakness)  Chart, occupational therapy assessment, plan of care, and goals were reviewed. ASSESSMENT  Patient continues with skilled OT services and is progressing towards goals. Patient making good progress, receptive to all skilled instruction and patient's daughter present throughout. Instructed in bed level exercises and benefit of performance in bed/gravity reduced plane. Will continue to benefit from skilled OT and recommend inpatient rehab.      Current Level of Function Impacting Discharge (ADLs): mod assist self feeding    Other factors to consider for discharge: was independent         PLAN :  Patient continues to benefit from skilled intervention to address the above impairments. Continue treatment per established plan of care to address goals. Recommend with staff: proximal support for eating and grooming, pillows under UE's at rest/digits in extension    Recommend next OT session: UE exercises in supine, bridge, grooming with proximal support    Recommendation for discharge: (in order for the patient to meet his/her long term goals)  Therapy 3 hours per day 5-7 days per week    This discharge recommendation:  Has been made in collaboration with the attending provider and/or case management    IF patient discharges home will need the following DME: none       SUBJECTIVE:   Patient stated when I woke up my arm was numb.  re: right UE    OBJECTIVE DATA SUMMARY:   Cognitive/Behavioral Status:  Neurologic State: Alert  Orientation Level: Oriented X4  Cognition: Follows commands; Appropriate for age attention/concentration  Perception: Appears intact  Perseveration: No perseveration noted  Safety/Judgement: Awareness of environment; Fall prevention;Home safety; Insight into deficits    Functional Mobility and Transfers for ADLs:  Bed Mobility:  Rolling: Minimum assistance  Supine to Sit: Moderate assistance    Transfers:  Sit to Stand: Minimum assistance          Balance:  Sitting: Intact; With support  Standing: Impaired; With support  Standing - Static: Fair;Constant support  Standing - Dynamic : Fair;Constant support    ADL Intervention:  Feeding  Food to Mouth: Compensatory technique training;Proximal stability  Drink to Mouth: Compensatory technique training;Proximal stability       Instructed on positioning for eating/grooming with proximal support of pillows at elbow to increase ability to perform task.  (nursing tech present for instruction on positioning of UE's and reported patient demonstrated increased ability to bring food to mouth for entire meal, required assist to load the fork with food however able to bring to mouth)    Educated on benefit of performance of exercises at bed level due to weakness, instructed on optimal joint alignment during UE exercises, positioning of head      Instructed on positioning of hands at rest with digits in extension to prevent soft tissue shortening/tightness of flexors. Reported tightness in digits. Cognitive Retraining  Safety/Judgement: Awareness of environment; Fall prevention;Home safety; Insight into deficits    Therapeutic Exercises: In supine for the following:   Instructed in positioning of head without pillow and LE's in bridge position, first instructed to hold LE's in static position, next, place pillow between LE's and squeeze/hold, finally instructed to elevate hips. Educated on benefit of varying performance, ie: amount of time holding/reps. Instructed on breathing technique due to noted holding breath throughout unless cued for breathing. Instructed with LE's in bridge to cross ankle over opposite knee, noted ability to maintain position with cues for control. Educated on benefit to stretch at hip and strengthen stabilizing leg. Completed ~30 seconds on right and left leg  Instructed on benefit of isometric exercises and instructed in performance of scapular retraction using bed for resistance, noted muscle contraction, cues for breathing throughout  Instructed in isometric elbow extension however due to inability to maintain UE at 90 degrees flexion instructed to focus on holding elbow flexion at varied planes and incorporation of hand and wrist movement to challenge ability to hold position.    Instructed with UE outstretched on bed and palm open to push palm into bed, noted muscle contraction anterior deltoid however patient reported feeling no movement, educated on benefit of muscle contraction and sustained contraction to strengthen muscles  Instructed on body mechanics and benefit of focusing on gross grasp with wrist in extension and digit extension with wrist in flexion, demonstrated movements and verbalized increased understanding. Noted decreased digit flexion and report of tightness  Performed joint mobilization right UE: radius/ulna, carpals, metacarpals, MP and PIP joints, demonstrated good tolerance and slightly decreased tightness, educated on benefit of stretch due to decreased UE use resulting in tightness in tendons/ligaments. Instructed to increase focus on digit extension and active  without attempt to use ball/resistive sponge    Pain:  No complaint    Activity Tolerance:   Good    After treatment patient left in no apparent distress:   Call bell within reach, Caregiver / family present, and head of bed elevated and eating with nursing tech assisting    COMMUNICATION/COLLABORATION:   The patients plan of care was discussed with: Registered nurse and Certified nursing assistant/patient care technician.      Juan Ramon Hussein OTR/L  Time Calculation: 39 mins

## 2021-09-14 NOTE — PROGRESS NOTES
9/14/2021  Case Management Progress Note    12:38 PM  Patient is 66year old male admitted 9/9 for Guillain [place] syndrome  Patient's RUR is 14% green/low risk for readmission  Chart reviewed--patient discussed at IDR rounds this morning  Patient and daughter met with [facility] liaison Palomalily Nelson this morning, however they would also like me to send referral to [address] even though yesterday patient did not want to go there due to distance. I have sent this referral in [employer], and patient is getting a rapid covid test just in case. Will continue to follow and assist with discharge planning. Transition of Care Plan   1. Continue medical management/treatment  2. Referral sent to [address]  3. Accepted at [facility]   4.  Waiting on reply from [address]   5. CM will follow    COURTNEY Gutierrez

## 2021-09-14 NOTE — PROGRESS NOTES
Neurology - Inpatient Progress Note     Name:   Josr Llanos  MRN:    [id number]  6 [facility] Date:   9/9/2021    Follow up:   Alirio Merritt consult note from 9-10-21. 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 Pt got first dose of IVIG that day.   Exam at time of Dr Larisa Merritt consult was as follows: No CN abnormalities, 3/5 proximal upper extremity strength 3+ to 4- intrinsic hand muscle weakness, iliopsoas strength 4/5 but otherwise 5/5 in lower extremities, hypo to areflexia, downgoing toes, normal cold sensation, reduced but absent vibratory sensation    Interval History     Completed 5 days of IVIG (0.4 mg/ kg/ day) yesterday  Reviewed Case Management Notes: looking for inpatient rehab bed (family prefers [facility] or [facility]; [facility] full)    Pt resting in bed, says he just had enema    Reports that he feels stronger in hands, was able to hold something and feed himself today    RUExt: shoulder 4+/ 5, 2/5 intrinsic hand muscles  LUExt: 4/5 shoulder, 3-4/5 intrinsic hand muscles  RLE 4+/5 proximal, 5/5 distal  LLE 5/5     Brief ROS: As noted above         Allergies   Allergen Reactions    Morphine Other (comments)     bloating       Current Facility-Administered Medications:     mineral oil (FLEET) enema, , Rectal, DAILY PRN, Nhung Greco DO, Given at 09/14/21 1152    spironolactone (ALDACTONE) tablet 50 mg, 50 mg, Oral, DAILY, Hubert, Allyn, DO, 50 mg at 09/14/21 0907    polyethylene glycol (MIRALAX) packet 17 g, 17 g, Oral, BID, Hubert, Allyn, DO, 17 g at 09/14/21 0907    hydrALAZINE (APRESOLINE) 20 mg/mL injection 10 mg, 10 mg, IntraVENous, Q6H PRN, Hubert, Allyn, DO    melatonin (rapid dissolve) tablet 5 mg, 5 mg, Oral, QHS PRN, Fernando Mccray MD, 5 mg at 09/12/21 2208    allopurinoL (ZYLOPRIM) tablet 600 mg, 600 mg, Oral, DAILY, Alejandra Soto MD, 600 mg at 09/14/21 0908    amLODIPine (NORVASC) tablet 10 mg, 10 mg, Oral, DAILY, Bea Chandler MD, 10 mg at 09/14/21 0907    doxazosin (CARDURA) tablet 8 mg, 8 mg, Oral, DAILY, Bea Chandler MD, 8 mg at 09/14/21 5379    finasteride (PROSCAR) tablet 5 mg, 5 mg, Oral, DAILY, Bea Chandler MD, 5 mg at 09/14/21 0907    losartan (COZAAR) tablet 100 mg, 100 mg, Oral, DAILY, Bea Chandler MD, 100 mg at 09/14/21 0907    enoxaparin (LOVENOX) injection 40 mg, 40 mg, SubCUTAneous, Q24H, Bea Chandler MD, 40 mg at 09/14/21 5600    senna-docusate (PERICOLACE) 8.6-50 mg per tablet 1 Tablet, 1 Tablet, Oral, BID, Anna Dawn MD, 1 Tablet at 09/14/21 4470    acetaminophen (TYLENOL) tablet 650 mg, 650 mg, Oral, Q4H PRN, Erika Azul MD, 650 mg at 09/12/21 1707    diphenhydrAMINE (BENADRYL) injection 50 mg, 50 mg, IntraVENous, Q4H PRN, Erika Azul MD      PMHx: has a past medical history of Anemia (05/07/2021), BPH (benign prostatic hyperplasia), History of gout, HTN (hypertension), Low vitamin D level, and MSSA (methicillin-susceptible Staphylococcus aureus) colonization (05/06/2021). PSHx: has a past surgical history that includes hx colonoscopy and hx cataract removal (Right). Impression / Plan       ICD-10-CM ICD-9-CM   1. Guillain Barré syndrome (HCC)  G61.0 357.0   2. Tingling in extremities  R20.2 782.0   3. Abdominal discomfort  R10.9 789.00   4.  Hypertension, unspecified type  I10 401.9     Guillain [place] syndrome  Gradually getting stronger  Completed 5 day course of IVIG today   Awaiting IPR placement    F/u with Dr Nakul Wyatt in 3 weeks in Neurology Clinic      Signed By: Yoanna Mcmillan MD     September 14, 2021

## 2021-09-14 NOTE — PROGRESS NOTES
Fleet enema given at [phone] with no results.      Tap water enema given at 1440 with large amount positive results from rectum

## 2021-09-15 ENCOUNTER — APPOINTMENT (OUTPATIENT)
Dept: CT IMAGING | Age: 71
DRG: 096 | End: 2021-09-15
Attending: INTERNAL MEDICINE
Payer: MEDICARE

## 2021-09-15 LAB
ALBUMIN SERPL-MCNC: 2.9 G/DL (ref 3.5–5)
ALBUMIN/GLOB SERPL: 0.5 {RATIO} (ref 1.1–2.2)
ALP SERPL-CCNC: 82 U/L (ref 45–117)
ALT SERPL-CCNC: 48 U/L (ref 12–78)
ANION GAP SERPL CALC-SCNC: 6 MMOL/L (ref 5–15)
AST SERPL-CCNC: 40 U/L (ref 15–37)
BASOPHILS # BLD: 0 K/UL (ref 0–0.1)
BASOPHILS NFR BLD: 0 % (ref 0–1)
BILIRUB SERPL-MCNC: 0.4 MG/DL (ref 0.2–1)
BUN SERPL-MCNC: 18 MG/DL (ref 6–20)
BUN/CREAT SERPL: 18 (ref 12–20)
CALCIUM SERPL-MCNC: 9.1 MG/DL (ref 8.5–10.1)
CHLORIDE SERPL-SCNC: 104 MMOL/L (ref 97–108)
CO2 SERPL-SCNC: 23 MMOL/L (ref 21–32)
CREAT SERPL-MCNC: 1.02 MG/DL (ref 0.7–1.3)
DIFFERENTIAL METHOD BLD: ABNORMAL
EOSINOPHIL # BLD: 0.2 K/UL (ref 0–0.4)
EOSINOPHIL NFR BLD: 4 % (ref 0–7)
ERYTHROCYTE [DISTWIDTH] IN BLOOD BY AUTOMATED COUNT: 15.1 % (ref 11.5–14.5)
GLOBULIN SER CALC-MCNC: 6.3 G/DL (ref 2–4)
GLUCOSE SERPL-MCNC: 97 MG/DL (ref 65–100)
HCT VFR BLD AUTO: 29.8 % (ref 36.6–50.3)
HGB BLD-MCNC: 9.7 G/DL (ref 12.1–17)
IMM GRANULOCYTES # BLD AUTO: 0 K/UL (ref 0–0.04)
IMM GRANULOCYTES NFR BLD AUTO: 0 % (ref 0–0.5)
LYMPHOCYTES # BLD: 0.8 K/UL (ref 0.8–3.5)
LYMPHOCYTES NFR BLD: 17 % (ref 12–49)
MCH RBC QN AUTO: 29.5 PG (ref 26–34)
MCHC RBC AUTO-ENTMCNC: 32.6 G/DL (ref 30–36.5)
MCV RBC AUTO: 90.6 FL (ref 80–99)
MONOCYTES # BLD: 0.7 K/UL (ref 0–1)
MONOCYTES NFR BLD: 13 % (ref 5–13)
NEUTS SEG # BLD: 3.2 K/UL (ref 1.8–8)
NEUTS SEG NFR BLD: 66 % (ref 32–75)
NRBC # BLD: 0 K/UL (ref 0–0.01)
NRBC BLD-RTO: 0 PER 100 WBC
PLATELET # BLD AUTO: 341 K/UL (ref 150–400)
PMV BLD AUTO: 9.5 FL (ref 8.9–12.9)
POTASSIUM SERPL-SCNC: 4.1 MMOL/L (ref 3.5–5.1)
PROT SERPL-MCNC: 9.2 G/DL (ref 6.4–8.2)
RBC # BLD AUTO: 3.29 M/UL (ref 4.1–5.7)
SODIUM SERPL-SCNC: 133 MMOL/L (ref 136–145)
WBC # BLD AUTO: 4.9 K/UL (ref 4.1–11.1)

## 2021-09-15 PROCEDURE — 65660000000 HC RM CCU STEPDOWN

## 2021-09-15 PROCEDURE — 97530 THERAPEUTIC ACTIVITIES: CPT

## 2021-09-15 PROCEDURE — 97535 SELF CARE MNGMENT TRAINING: CPT

## 2021-09-15 PROCEDURE — 36415 COLL VENOUS BLD VENIPUNCTURE: CPT

## 2021-09-15 PROCEDURE — 74011250636 HC RX REV CODE- 250/636: Performed by: HOSPITALIST

## 2021-09-15 PROCEDURE — 74011250637 HC RX REV CODE- 250/637: Performed by: INTERNAL MEDICINE

## 2021-09-15 PROCEDURE — 94010 BREATHING CAPACITY TEST: CPT

## 2021-09-15 PROCEDURE — 74176 CT ABD & PELVIS W/O CONTRAST: CPT

## 2021-09-15 PROCEDURE — 2709999900 HC NON-CHARGEABLE SUPPLY

## 2021-09-15 PROCEDURE — 74011000250 HC RX REV CODE- 250: Performed by: SPECIALIST

## 2021-09-15 PROCEDURE — 74011250637 HC RX REV CODE- 250/637: Performed by: HOSPITALIST

## 2021-09-15 PROCEDURE — 80053 COMPREHEN METABOLIC PANEL: CPT

## 2021-09-15 PROCEDURE — U0005 INFEC AGEN DETEC AMPLI PROBE: HCPCS

## 2021-09-15 PROCEDURE — 85025 COMPLETE CBC W/AUTO DIFF WBC: CPT

## 2021-09-15 PROCEDURE — 74011250636 HC RX REV CODE- 250/636: Performed by: FAMILY MEDICINE

## 2021-09-15 RX ORDER — POLYETHYLENE GLYCOL 3350, SODIUM SULFATE, SODIUM CHLORIDE, POTASSIUM CHLORIDE, SODIUM ASCORBATE, AND ASCORBIC ACID 7.5-2.691G
1 KIT ORAL EVERY 12 HOURS
Status: COMPLETED | OUTPATIENT
Start: 2021-09-15 | End: 2021-09-16

## 2021-09-15 RX ADMIN — ENOXAPARIN SODIUM 40 MG: 40 INJECTION SUBCUTANEOUS at 11:06

## 2021-09-15 RX ADMIN — SPIRONOLACTONE 50 MG: 25 TABLET ORAL at 11:05

## 2021-09-15 RX ADMIN — DIPHENHYDRAMINE HYDROCHLORIDE 50 MG: 50 INJECTION, SOLUTION INTRAMUSCULAR; INTRAVENOUS at 01:16

## 2021-09-15 RX ADMIN — FINASTERIDE 5 MG: 5 TABLET, FILM COATED ORAL at 11:06

## 2021-09-15 RX ADMIN — LOSARTAN POTASSIUM 100 MG: 50 TABLET, FILM COATED ORAL at 11:05

## 2021-09-15 RX ADMIN — POLYETHYLENE GLYCOL 3350 17 G: 17 POWDER, FOR SOLUTION ORAL at 18:56

## 2021-09-15 RX ADMIN — AMLODIPINE BESYLATE 10 MG: 5 TABLET ORAL at 11:05

## 2021-09-15 RX ADMIN — POLYETHYLENE GLYCOL 3350, SODIUM SULFATE, SODIUM CHLORIDE, POTASSIUM CHLORIDE, ASCORBIC ACID, SODIUM ASCORBATE 1 L: KIT at 18:59

## 2021-09-15 RX ADMIN — POLYETHYLENE GLYCOL 3350 17 G: 17 POWDER, FOR SOLUTION ORAL at 11:06

## 2021-09-15 RX ADMIN — DOCUSATE SODIUM 50MG AND SENNOSIDES 8.6MG 1 TABLET: 8.6; 5 TABLET, FILM COATED ORAL at 11:05

## 2021-09-15 RX ADMIN — DOCUSATE SODIUM 50MG AND SENNOSIDES 8.6MG 1 TABLET: 8.6; 5 TABLET, FILM COATED ORAL at 18:56

## 2021-09-15 RX ADMIN — ALLOPURINOL 600 MG: 300 TABLET ORAL at 11:14

## 2021-09-15 RX ADMIN — DOXAZOSIN 8 MG: 2 TABLET ORAL at 11:14

## 2021-09-15 NOTE — PROGRESS NOTES
Bedside shift change report given to Kalin Pride RN (oncoming nurse) by Carlton Allen RN (offgoing nurse). Report included the following information SBAR and Kardex.

## 2021-09-15 NOTE — PROGRESS NOTES
Problem: Self Care Deficits Care Plan (Adult)  Goal: *Acute Goals and Plan of Care (Insert Text)  Description:   FUNCTIONAL STATUS PRIOR TO ADMISSION: Pt was independent with ADLs and mobility up until a few weeks ago. He has experienced progressive decline in function over the past few weeks, and his children have been staying with him to assist with ADLs. HOME SUPPORT: The patient lived alone with family to provide assistance. Occupational Therapy Goals  Initiated 9/11/2021  1. Patient will perform self-feeding with minimal assistance within 7 day(s). 2.  Patient will perform grooming with supervision/set within 7 day(s). 3.  Patient will perform toilet transfers with minimal assistance within 7 day(s). 4.  Patient will perform all aspects of toileting with moderate assistance  within 7 day(s). 5.  Patient will participate in upper extremity therapeutic exercise/activities with minimal assistance for 5 minutes within 7 day(s). 6.  Patient will utilize energy conservation techniques during functional activities with verbal cues within 7 day(s). Outcome: Progressing Towards Goal   OCCUPATIONAL THERAPY TREATMENT  Patient: Wilene Skiff (45 y.o. male)  Date: 9/15/2021  Diagnosis: Guillain Barré syndrome (HCC) [G61.0] <principal problem not specified>  Procedure(s) (LRB):  COLONOSCOPY (N/A)    Precautions: Fall (RUE/LE weakness)  Chart, occupational therapy assessment, plan of care, and goals were reviewed. ASSESSMENT  Patient continues with skilled OT services and is progressing towards goals. Pt in restroom, min assist sit to stand from commode, preparing to have colonoscopy tomorrow. After return to bed he was able to roll with min assist to adjust chux and also engaged with bridging exercises, instructed to control breathing with reps. Pt able to cross one ankle over knee R and L and bridge holding 30 sec 5 reps.  He than needed to use restroom again, supine to sit moderate assist due to UE weakness and than ambulation to restroom, family present and able to assist as needed. Pt remains excellent rehab candidate, highly motivated. Current Level of Function Impacting Discharge (ADLs): min assist toileting transfer, min assist to roll for ADl's    Other factors to consider for discharge:          PLAN :  Patient continues to benefit from skilled intervention to address the above impairments. Continue treatment per established plan of care to address goals. Recommend with staff: out of bed to chair for meals, there ex, there act, restroom    Recommend next OT session: cont towards goals    Recommendation for discharge: (in order for the patient to meet his/her long term goals)  Therapy 3 hours per day 5-7 days per week    This discharge recommendation:  Has not yet been discussed the attending provider and/or case management    IF patient discharges home will need the following DME:        SUBJECTIVE:   Patient stated I need the restroom again.     OBJECTIVE DATA SUMMARY:   Cognitive/Behavioral Status:  Neurologic State: Alert  Orientation Level: Oriented X4  Cognition: Follows commands             Functional Mobility and Transfers for ADLs:  Bed Mobility:  Rolling: Minimum assistance  Sit to Supine: Minimum assistance    Transfers:     Functional Transfers  Toilet Transfer : Minimum assistance       Balance:Impaired standing        ADL Intervention:   Moderate assist UB bathe, dress         Therapeutic Exercises: In supine for the followin. Instructed in positioning of head without pillow and LE's in bridge position, first instructed to hold LE's in static position, next, place pillow between LE's and squeeze/hold, finally instructed to elevate hips. Educated on benefit of varying performance, ie: amount of time holding/reps. Instructed on breathing technique due to noted holding breath throughout unless cued for breathing.    2. Instructed with LE's in bridge to cross ankle over opposite knee, noted ability to maintain position with cues for control. Educated on benefit to stretch at hip and strengthen stabilizing leg. Completed ~30 seconds on right and left leg  3. Instructed on benefit of isometric exercises.         Activity Tolerance:   Fair    After treatment patient left in no apparent distress:   Supine in bed    COMMUNICATION/COLLABORATION:   The patients plan of care was discussed with: Occupational therapist.     RAHUL Oswald/L  Time Calculation: 14 mins

## 2021-09-15 NOTE — CONSULTS
Grant Olmstead. Bony Johnson MD  [phone] office  [phone] voicemail   Gastroenterology Consultation Note      Admit Date: 9/9/2021  Consult Date: 9/15/2021   I greatly appreciate your asking me to see Wilene Skiff, thank you very much for the opportunity to participate in his care. Narrative Assessment and Plan   · Abdominal distension  · Constipation  · Due for surveillance colonoscopy given history of colon polyps    He asks for colonoscopy, discussed with Dr. Marla Carlson, will offer for tomorrow. Prep ordered. Procedure scheduled. Needs Covid testing based on endo unit protocol if not done. Subjective:     Chief Complaint: Abdominal Pain    History of Present Illness: Wilene Skiff is a(n) 66 y.o. male who complains of abdominal pain. The pain is located in the diffusely area without radiation. Pain is described as bloating and measures 2/10 in intensity. Onset of pain was several days ago. Since onset the pain is about the same. Aggravating factors include none. Alleviating factors include bowel movements. Associated symptoms include constipation.       PCP:  Dianelys Kong MD    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/06/2021    [place]        Past Surgical History:   Procedure Laterality Date    HX CATARACT REMOVAL Right     HX COLONOSCOPY         Social History     Tobacco Use    Smoking status: Never Smoker    Smokeless tobacco: Never Used   Substance Use Topics    Alcohol use: Yes     Comment: social        Family History   Problem Relation Age of Onset    Anesth Problems Neg Hx     Clotting Disorder Neg Hx         Allergies   Allergen Reactions    Morphine Other (comments)     bloating            Home Medications:  Prior to Admission Medications   Prescriptions Last Dose Informant Patient Reported? Taking?   allopurinoL (ZYLOPRIM) 300 mg tablet   Yes No   Sig: Take 600 mg by mouth daily. amLODIPine (Norvasc) 10 mg tablet   Yes No   Sig: Take 10 mg by mouth daily. docusate sodium (COLACE) 100 mg capsule   No No   Sig: Take 1 Capsule by mouth two (2) times a day. doxazosin (CARDURA) 8 mg tablet   Yes No   Sig: Take 8 mg by mouth daily. finasteride (PROSCAR) 5 mg tablet   Yes No   Sig: Take 5 mg by mouth daily. garlic 1,916 mg cap   Yes No   Sig: Take 1 Tablet by mouth daily. losartan (COZAAR) 100 mg tablet   Yes No   Sig: Take 100 mg by mouth daily. Indications: high blood pressure   multivitamin (Men's Multi-Vitamin) tablet   Yes No   Sig: Take 1 Tablet by mouth daily. omega 3-dha-epa-fish oil (Fish Oil) 100-160-1,000 mg cap   Yes No   Sig: Take 1 Capsule by mouth daily. spironolactone (ALDACTONE) 25 mg tablet   Yes No   Sig: Take 25 mg by mouth daily. vitamin E mixed/tocotrienol (VITAMIN E COMPLEX PO)   Yes No   Sig: Take 184 mg by mouth daily.       Facility-Administered Medications: None       Hospital Medications:  Current Facility-Administered Medications   Medication Dose Route Frequency    mineral oil (FLEET) enema   Rectal DAILY PRN    spironolactone (ALDACTONE) tablet 50 mg  50 mg Oral DAILY    polyethylene glycol (MIRALAX) packet 17 g  17 g Oral BID    hydrALAZINE (APRESOLINE) 20 mg/mL injection 10 mg  10 mg IntraVENous Q6H PRN    melatonin (rapid dissolve) tablet 5 mg  5 mg Oral QHS PRN    allopurinoL (ZYLOPRIM) tablet 600 mg  600 mg Oral DAILY    amLODIPine (NORVASC) tablet 10 mg  10 mg Oral DAILY    doxazosin (CARDURA) tablet 8 mg  8 mg Oral DAILY    finasteride (PROSCAR) tablet 5 mg  5 mg Oral DAILY    losartan (COZAAR) tablet 100 mg  100 mg Oral DAILY    enoxaparin (LOVENOX) injection 40 mg  40 mg SubCUTAneous Q24H    senna-docusate (PERICOLACE) 8.6-50 mg per tablet 1 Tablet  1 Tablet Oral BID    acetaminophen (TYLENOL) tablet 650 mg  650 mg Oral Q4H PRN    diphenhydrAMINE (BENADRYL) injection 50 mg  50 mg IntraVENous Q4H PRN       Review of Systems: Admission ROS by Ja Saavedra MD from 9/9/2021 were reviewed with the patient and changes (other than per HPI) include: none      Objective:     Physical Exam:  Visit Vitals  /80 (BP 1 Location: Left upper arm, BP Patient Position: At rest)   Pulse 85   Temp 98.3 °F (36.8 °C)   Resp 16   Ht 5' 7\" (1.702 m)   Wt 79.4 kg (175 lb)   SpO2 96%   BMI 27.41 kg/m²     SpO2 Readings from Last 6 Encounters:   09/15/21 96%   09/08/21 98%   08/28/21 99%   08/18/21 98%   05/26/21 97%   05/06/21 98%            Intake/Output Summary (Last 24 hours) at 9/15/2021 1219  Last data filed at 9/15/2021 4341  Gross per 24 hour   Intake 360 ml   Output    Net 360 ml      General: no distress, comfortable  Skin:  No rash or palpable dermatologic mass lesions  HEENT: Pupils equal, sclera anicteric, oropharynx with no gross lesions  Cardiovascular: No abnormal audible heart sounds, well perfused, no edema  Respiratory:  No abnormal audible breath sounds, normal respiratory effort, no throacic deformity  GI:  Abdomen slightly distended, nontender, no mass, no free fluid, no rebound or guarding. Musculoskeletal:  No skeletal deformity nor acute arthritis noted.   Neurological:  Motor and sensory function intact in upper extremeties  Psychiatric:  Normal affect, memory intact, appears to have insight into current illness  Lymphatic:  No cervical, supraclavicular, or periumbilic lymphadenopathy    Laboratory:    Recent Results (from the past 24 hour(s))   CBC WITH AUTOMATED DIFF    Collection Time: 09/15/21  1:25 AM   Result Value Ref Range    WBC 4.9 4.1 - 11.1 K/uL    RBC 3.29 (L) 4.10 - 5.70 M/uL    HGB 9.7 (L) 12.1 - 17.0 g/dL    HCT 29.8 (L) 36.6 - 50.3 %    MCV 90.6 80.0 - 99.0 FL    MCH 29.5 26.0 - 34.0 PG    MCHC 32.6 30.0 - 36.5 g/dL    RDW 15.1 (H) 11.5 - 14.5 %    PLATELET 178 298 - 864 K/uL    MPV 9.5 8.9 - 12.9 FL    NRBC 0.0 0  WBC    ABSOLUTE NRBC 0.00 0.00 - 0.01 K/uL    NEUTROPHILS 66 32 - 75 %    LYMPHOCYTES 17 12 - 49 %    MONOCYTES 13 5 - 13 %    EOSINOPHILS 4 0 - 7 %    BASOPHILS 0 0 - 1 %    IMMATURE GRANULOCYTES 0 0.0 - 0.5 %    ABS. NEUTROPHILS 3.2 1.8 - 8.0 K/UL    ABS. LYMPHOCYTES 0.8 0.8 - 3.5 K/UL    ABS. MONOCYTES 0.7 0.0 - 1.0 K/UL    ABS. EOSINOPHILS 0.2 0.0 - 0.4 K/UL    ABS. BASOPHILS 0.0 0.0 - 0.1 K/UL    ABS. IMM. GRANS. 0.0 0.00 - 0.04 K/UL    DF AUTOMATED     METABOLIC PANEL, COMPREHENSIVE    Collection Time: 09/15/21  1:25 AM   Result Value Ref Range    Sodium 133 (L) 136 - 145 mmol/L    Potassium 4.1 3.5 - 5.1 mmol/L    Chloride 104 97 - 108 mmol/L    CO2 23 21 - 32 mmol/L    Anion gap 6 5 - 15 mmol/L    Glucose 97 65 - 100 mg/dL    BUN 18 6 - 20 MG/DL    Creatinine 1.02 0.70 - 1.30 MG/DL    BUN/Creatinine ratio 18 12 - 20      GFR est AA >60 >60 ml/min/1.73m2    GFR est non-AA >60 >60 ml/min/1.73m2    Calcium 9.1 8.5 - 10.1 MG/DL    Bilirubin, total 0.4 0.2 - 1.0 MG/DL    ALT (SGPT) 48 12 - 78 U/L    AST (SGOT) 40 (H) 15 - 37 U/L    Alk. phosphatase 82 45 - 117 U/L    Protein, total 9.2 (H) 6.4 - 8.2 g/dL    Albumin 2.9 (L) 3.5 - 5.0 g/dL    Globulin 6.3 (H) 2.0 - 4.0 g/dL    A-G Ratio 0.5 (L) 1.1 - 2.2           Assessment/Plan:     Active Problems:    Guillain Barré syndrome ([facility] (9/9/2021)         See above narrative for full detail.

## 2021-09-15 NOTE — PROGRESS NOTES
09/15/21 1127   Bedside Spirometry   SVC (liters) 2.26 Liters   Patient Postioning Sitting up in bed   Patient Effort Reproducable

## 2021-09-15 NOTE — PROGRESS NOTES
Nutrition Assessment     Type and Reason for Visit: Initial/Rescreen LOS    Nutrition Recommendations/Plan:   1. Advance diet to regular post-colonscopy per GI. Nutrition Assessment:     Pt is a 66year old male admitted with Guillain Barré syndrome. He has a past medical history of Anemia, benign prostatic hyperplasia, gout, HTN, and MSSA. Sitting up at bedside with daughter in room during [address] visit. Patient endorses good appetite, no chewing/swallowing problems. States he gets full quickly, but this is his baseline. No c/o N/V/D at this time. Reports -180# with no recent weight changes. Eager to learn more about his colonoscopy tomorrow. Patient Vitals for the past 168 hrs:   % Diet Eaten   09/14/21 1723 51 - 75%   09/14/21 1500 1 - 25%   09/14/21 0911 26 - 50%   09/13/21 1404 51 - 75%   09/13/21 1024 51 - 75%   09/12/21 1710 51 - 75%   09/12/21 0831 1 - 25%   09/11/21 1439 1 - 25%   09/10/21 0740 51 - 75%   09/10/21 0112 0%       Wt Readings from Last 10 Encounters:   09/09/21 79.4 kg (175 lb)   08/28/21 81.6 kg (180 lb)   08/18/21 79.4 kg (175 lb)   05/19/21 81.2 kg (179 lb)   05/06/21 81.2 kg (179 lb)     Malnutrition Assessment:  Malnutrition Status: No malnutrition     Estimated Daily Nutrient Needs:  Energy (kcal):  1952 (MSJ x 1.3)  Protein (g):  79-95 (1.0-1.2)       Fluid (ml/day):  1952    Nutrition Related Findings:       ABD: Distended, semi-soft  Last BM: 9/14  Edema: None noted 9/15    Nutr. Labs:  Na 133 (L)  Hgb 9.7 (L)  Lab Results   Component Value Date/Time    Glucose 97 09/15/2021 01:25 AM    Glucose (POC) 141 (H) 05/21/2021 11:54 AM     Nutr.  Meds:  Norvasc  Lovenox  Proscar  Cozaar  Miralax  Pericolace    Current Nutrition Therapies:  ADULT DIET Clear Liquid  DIET NPO    Anthropometric Measures:  · Height:  5' 7.01\" (170.2 cm)  · Current Body Wt:  79.4 kg (175 lb)  · BMI: 27.4    Nutrition Diagnosis:   · No nutrition diagnosis at this time     Nutrition Intervention:  Food and/or Nutrient Delivery: Continue current diet  Nutrition Education and Counseling: No recommendations at this time  Coordination of Nutrition Care: Continue to monitor while inpatient    Goals:  PO intake >/= 50% estimated protein needs within 3 - 5 days       Nutrition Monitoring and Evaluation:   Behavioral-Environmental Outcomes: None identified  Food/Nutrient Intake Outcomes: Food and nutrient intake  Physical Signs/Symptoms Outcomes: Biochemical data, Weight    Discharge Planning:    No discharge needs at this time     Electronically signed by Ting Romano RD on 9/21/8037   Contact Number: [phone] or via [employer]

## 2021-09-15 NOTE — PROGRESS NOTES
Vinicio Levy [facility]  [address] [facility], [place], [address] [place] [address]  [phone]      Medical Progress Note      NAME: Rosa To   :  1950  MRM:  [id number]    Date/Time: 9/15/2021        Assessment / Plan:     67 yo M w/ hx of HTN, BPH, recent L-spine laminectomy presenting w/ UE weakness, numbness, admitted for GBS    Acute abdominal pain/distention / constipation: likely related to GBS / dysautonomia as pt has constipation and urinary retention. Patient does has have a history of colon polyps. CT abdomen pelvis 9/15 showing no acute concerns. Continue aggressive bowel regimen. Bladder scans as needed. GI consulted; plan for colonoscopy tomorrow afternoon. 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 S/p IVIG x 5 days. PT/OT; plan for IRF    HTN: wnl. Cont Norvasc, losartan, Aldactone. IV hydralazine PRN. BPH: cont doxazosin and finasteride     Total time spent: 31 minutes  Time spent in the care of this patient including reviewing records, discussing with nursing and/or other providers on the treatment team, obtaining history and examining the patient, and discussing treatment plans. Care Plan discussed with: Patient, Nursing Staff and >50% of time spent in counseling and coordination of care, GI     Discussed:  Care Plan and D/C Planning    Prophylaxis:  Lovenox    Disposition:  [facility]/Rehab         Subjective:     Chief Complaint:  Follow up weakness    Chart/notes/labs/studies reviewed, patient examined. Improved upper extremity weakness. Reports abdominal discomfort and pain; no associated nausea vomiting. Objective:       Vitals:        Last 24hrs VS reviewed since prior progress note.  Most recent are:    Visit Vitals  /72 (BP 1 Location: Left upper arm, BP Patient Position: At rest)   Pulse 92   Temp 98 °F (36.7 °C)   Resp 16   Ht 5' 7.01\" (1.702 m)   Wt 79.4 kg (175 lb) SpO2 96%   BMI 27.40 kg/m²     SpO2 Readings from Last 6 Encounters:   09/15/21 96%   09/08/21 98%   08/28/21 99%   08/18/21 98%   05/26/21 97%   05/06/21 98%            Intake/Output Summary (Last 24 hours) at 9/15/2021 1847  Last data filed at 9/15/2021 1804  Gross per 24 hour   Intake 360 ml   Output    Net 360 ml          Exam:     Physical Exam:    Gen:  Well-developed, well-nourished, in no acute distress. Pleasant   HEENT:  Atraumatic, normocephalic. Sclerae nonicteric, hearing intact to voice  Neck:  Supple, without apparent masses. Resp:  No accessory muscle use, CTAB without wheezes, rales, or rhonchi  Card: RRR, without m/r/g. No LE edema  Abd:  +bowel sounds, soft, NTTP, distended  Neuro: Face symmetric, speech fluent, follows commands appropriately, reduced UE strength   Psych:  Alert, oriented x 3.  Good insight     Medications Reviewed: (see below)    Lab Data Reviewed: (see below)    ______________________________________________________________________    Medications:     Current Facility-Administered Medications   Medication Dose Route Frequency    peg 3350-Electrolytes-Vit C (MOVIPREP) oral solution 1 L  1 L Oral Q12H    mineral oil (FLEET) enema   Rectal DAILY PRN    spironolactone (ALDACTONE) tablet 50 mg  50 mg Oral DAILY    polyethylene glycol (MIRALAX) packet 17 g  17 g Oral BID    hydrALAZINE (APRESOLINE) 20 mg/mL injection 10 mg  10 mg IntraVENous Q6H PRN    melatonin (rapid dissolve) tablet 5 mg  5 mg Oral QHS PRN    allopurinoL (ZYLOPRIM) tablet 600 mg  600 mg Oral DAILY    amLODIPine (NORVASC) tablet 10 mg  10 mg Oral DAILY    doxazosin (CARDURA) tablet 8 mg  8 mg Oral DAILY    finasteride (PROSCAR) tablet 5 mg  5 mg Oral DAILY    losartan (COZAAR) tablet 100 mg  100 mg Oral DAILY    enoxaparin (LOVENOX) injection 40 mg  40 mg SubCUTAneous Q24H    senna-docusate (PERICOLACE) 8.6-50 mg per tablet 1 Tablet  1 Tablet Oral BID    acetaminophen (TYLENOL) tablet 650 mg  650 mg Oral Q4H PRN    diphenhydrAMINE (BENADRYL) injection 50 mg  50 mg IntraVENous Q4H PRN            Lab Review:     Recent Labs     09/15/21  0125 09/14/21  0200 09/13/21  0521   WBC 4.9 4.6 4.6   HGB 9.7* 9.7* 10.2*   HCT 29.8* 30.6* 32.2*    333 338     Recent Labs     09/15/21  0125 09/14/21  0200 09/13/21  0521   * 132* 133*   K 4.1 4.0 4.1    104 105   CO2 23 24 24   GLU 97 106* 113*   BUN 18 14 12   CREA 1.02 0.96 0.92   CA 9.1 8.8 9.3   ALB 2.9* 2.8* 3.0*   ALT 48 37 35     No components found for: GLPOC  No results for input(s): PH, PCO2, PO2, HCO3, FIO2 in the last 72 hours. No results for input(s): INR, INREXT, INREXT in the last 72 hours. No results found for: SDES  Lab Results   Component Value Date/Time    Culture result:  05/06/2021 11:27 AM     MRSA NOT PRESENT. Apparent Staphylococus aureus (not MRSA noted). Culture result:  05/06/2021 11:27 AM     Screening of patient nares for MRSA is for surveillance purposes and, if positive, to facilitate isolation considerations in high risk settings.  It is not intended for automatic decolonization interventions per se as regimens are not sufficiently effective to warrant routine use.              ___________________________________________________    Attending Physician: Bing Resendiz DO

## 2021-09-15 NOTE — PROGRESS NOTES
Problem: Falls - Risk of  Goal: *Absence of Falls  Description: Document Gama Nguyen Fall Risk and appropriate interventions in the flowsheet.   Outcome: Progressing Towards Goal  Note: Fall Risk Interventions:  Mobility Interventions: Bed/chair exit alarm         Medication Interventions: Bed/chair exit alarm    Elimination Interventions: Bed/chair exit alarm              Problem: Pain  Goal: *Control of Pain  Outcome: Progressing Towards Goal  Goal: *PALLIATIVE CARE:  Alleviation of Pain  Outcome: Progressing Towards Goal 8

## 2021-09-15 NOTE — PROGRESS NOTES
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 Additionally, patient is to have colonoscopy tomorrow at 2pm and so is not medically stable until then. I have spoken to Arabella Garcia at [address] and she may be able to admit him tomorrow afternoon following colonoscopy--she will know more tomorrow morning. We will touch base then and make sure there is a bed available. Patient will transport in the car with his daughter/wife. Will continue to follow. Transition of Care Plan   1. Continue medical management/treatment  2. First choice is now [address]   3. Colonoscopy tomorrow  4. Will check in about a bed tomorrow morning--may have one tomorrow afternoon after scope  5. Family to transport  6.  CM will follow    Milissa Pallas, MSW

## 2021-09-16 ENCOUNTER — APPOINTMENT (OUTPATIENT)
Dept: GENERAL RADIOLOGY | Age: 71
DRG: 096 | End: 2021-09-16
Attending: INTERNAL MEDICINE
Payer: MEDICARE

## 2021-09-16 ENCOUNTER — ANESTHESIA EVENT (OUTPATIENT)
Dept: ENDOSCOPY | Age: 71
DRG: 096 | End: 2021-09-16
Payer: MEDICARE

## 2021-09-16 ENCOUNTER — ANESTHESIA (OUTPATIENT)
Dept: ENDOSCOPY | Age: 71
DRG: 096 | End: 2021-09-16
Payer: MEDICARE

## 2021-09-16 VITALS
RESPIRATION RATE: 24 BRPM | BODY MASS INDEX: 27.47 KG/M2 | OXYGEN SATURATION: 99 % | DIASTOLIC BLOOD PRESSURE: 53 MMHG | WEIGHT: 175.04 LBS | SYSTOLIC BLOOD PRESSURE: 118 MMHG | HEIGHT: 67 IN | HEART RATE: 97 BPM | TEMPERATURE: 98.6 F

## 2021-09-16 LAB
ALBUMIN SERPL-MCNC: 3 G/DL (ref 3.5–5)
ALBUMIN/GLOB SERPL: 0.5 {RATIO} (ref 1.1–2.2)
ALP SERPL-CCNC: 90 U/L (ref 45–117)
ALT SERPL-CCNC: 51 U/L (ref 12–78)
ANION GAP SERPL CALC-SCNC: 7 MMOL/L (ref 5–15)
AST SERPL-CCNC: 50 U/L (ref 15–37)
BASOPHILS # BLD: 0 K/UL (ref 0–0.1)
BASOPHILS NFR BLD: 1 % (ref 0–1)
BILIRUB SERPL-MCNC: 0.5 MG/DL (ref 0.2–1)
BUN SERPL-MCNC: 20 MG/DL (ref 6–20)
BUN/CREAT SERPL: 18 (ref 12–20)
CALCIUM SERPL-MCNC: 9 MG/DL (ref 8.5–10.1)
CHLORIDE SERPL-SCNC: 101 MMOL/L (ref 97–108)
CO2 SERPL-SCNC: 23 MMOL/L (ref 21–32)
CREAT SERPL-MCNC: 1.09 MG/DL (ref 0.7–1.3)
DIFFERENTIAL METHOD BLD: ABNORMAL
EOSINOPHIL # BLD: 0.2 K/UL (ref 0–0.4)
EOSINOPHIL NFR BLD: 3 % (ref 0–7)
ERYTHROCYTE [DISTWIDTH] IN BLOOD BY AUTOMATED COUNT: 15.1 % (ref 11.5–14.5)
GLOBULIN SER CALC-MCNC: 6.1 G/DL (ref 2–4)
GLUCOSE SERPL-MCNC: 113 MG/DL (ref 65–100)
HCT VFR BLD AUTO: 31.6 % (ref 36.6–50.3)
HGB BLD-MCNC: 10.3 G/DL (ref 12.1–17)
IMM GRANULOCYTES # BLD AUTO: 0 K/UL (ref 0–0.04)
IMM GRANULOCYTES NFR BLD AUTO: 0 % (ref 0–0.5)
LYMPHOCYTES # BLD: 0.9 K/UL (ref 0.8–3.5)
LYMPHOCYTES NFR BLD: 17 % (ref 12–49)
MCH RBC QN AUTO: 29.5 PG (ref 26–34)
MCHC RBC AUTO-ENTMCNC: 32.6 G/DL (ref 30–36.5)
MCV RBC AUTO: 90.5 FL (ref 80–99)
MONOCYTES # BLD: 0.7 K/UL (ref 0–1)
MONOCYTES NFR BLD: 14 % (ref 5–13)
NEUTS SEG # BLD: 3.5 K/UL (ref 1.8–8)
NEUTS SEG NFR BLD: 65 % (ref 32–75)
NRBC # BLD: 0 K/UL (ref 0–0.01)
NRBC BLD-RTO: 0 PER 100 WBC
PLATELET # BLD AUTO: 354 K/UL (ref 150–400)
PMV BLD AUTO: 9.3 FL (ref 8.9–12.9)
POTASSIUM SERPL-SCNC: 3.8 MMOL/L (ref 3.5–5.1)
PROT SERPL-MCNC: 9.1 G/DL (ref 6.4–8.2)
RBC # BLD AUTO: 3.49 M/UL (ref 4.1–5.7)
SARS-COV-2, COV2: NORMAL
SODIUM SERPL-SCNC: 131 MMOL/L (ref 136–145)
TSH SERPL DL<=0.05 MIU/L-ACNC: 2.21 UIU/ML (ref 0.36–3.74)
WBC # BLD AUTO: 5.4 K/UL (ref 4.1–11.1)

## 2021-09-16 PROCEDURE — 71045 X-RAY EXAM CHEST 1 VIEW: CPT

## 2021-09-16 PROCEDURE — 74011250636 HC RX REV CODE- 250/636: Performed by: NURSE ANESTHETIST, CERTIFIED REGISTERED

## 2021-09-16 PROCEDURE — 84443 ASSAY THYROID STIM HORMONE: CPT

## 2021-09-16 PROCEDURE — 76040000019: Performed by: SPECIALIST

## 2021-09-16 PROCEDURE — 74011250636 HC RX REV CODE- 250/636: Performed by: SPECIALIST

## 2021-09-16 PROCEDURE — 0DJD8ZZ INSPECTION OF LOWER INTESTINAL TRACT, VIA NATURAL OR ARTIFICIAL OPENING ENDOSCOPIC: ICD-10-PCS | Performed by: SPECIALIST

## 2021-09-16 PROCEDURE — 85025 COMPLETE CBC W/AUTO DIFF WBC: CPT

## 2021-09-16 PROCEDURE — 2709999900 HC NON-CHARGEABLE SUPPLY: Performed by: SPECIALIST

## 2021-09-16 PROCEDURE — 74011250637 HC RX REV CODE- 250/637: Performed by: HOSPITALIST

## 2021-09-16 PROCEDURE — 80053 COMPREHEN METABOLIC PANEL: CPT

## 2021-09-16 PROCEDURE — 94010 BREATHING CAPACITY TEST: CPT

## 2021-09-16 PROCEDURE — 74011000250 HC RX REV CODE- 250: Performed by: NURSE ANESTHETIST, CERTIFIED REGISTERED

## 2021-09-16 PROCEDURE — 74011000250 HC RX REV CODE- 250: Performed by: SPECIALIST

## 2021-09-16 PROCEDURE — 36415 COLL VENOUS BLD VENIPUNCTURE: CPT

## 2021-09-16 PROCEDURE — 76060000031 HC ANESTHESIA FIRST 0.5 HR: Performed by: SPECIALIST

## 2021-09-16 RX ORDER — FLUMAZENIL 0.1 MG/ML
0.2 INJECTION INTRAVENOUS
Status: DISCONTINUED | OUTPATIENT
Start: 2021-09-16 | End: 2021-09-16 | Stop reason: HOSPADM

## 2021-09-16 RX ORDER — DEXTROMETHORPHAN/PSEUDOEPHED 2.5-7.5/.8
1.2 DROPS ORAL
Status: DISCONTINUED | OUTPATIENT
Start: 2021-09-16 | End: 2021-09-16 | Stop reason: HOSPADM

## 2021-09-16 RX ORDER — AMOXICILLIN 250 MG
1 CAPSULE ORAL 2 TIMES DAILY
Qty: 90 TABLET | Refills: 0 | Status: SHIPPED
Start: 2021-09-16

## 2021-09-16 RX ORDER — PROPOFOL 10 MG/ML
INJECTION, EMULSION INTRAVENOUS AS NEEDED
Status: DISCONTINUED | OUTPATIENT
Start: 2021-09-16 | End: 2021-09-16 | Stop reason: HOSPADM

## 2021-09-16 RX ORDER — FENTANYL CITRATE 50 UG/ML
25 INJECTION, SOLUTION INTRAMUSCULAR; INTRAVENOUS AS NEEDED
Status: DISCONTINUED | OUTPATIENT
Start: 2021-09-16 | End: 2021-09-16 | Stop reason: HOSPADM

## 2021-09-16 RX ORDER — SPIRONOLACTONE 25 MG/1
25 TABLET ORAL DAILY
Status: DISCONTINUED | OUTPATIENT
Start: 2021-09-16 | End: 2021-09-16 | Stop reason: HOSPADM

## 2021-09-16 RX ORDER — NALOXONE HYDROCHLORIDE 0.4 MG/ML
0.4 INJECTION, SOLUTION INTRAMUSCULAR; INTRAVENOUS; SUBCUTANEOUS
Status: DISCONTINUED | OUTPATIENT
Start: 2021-09-16 | End: 2021-09-16 | Stop reason: HOSPADM

## 2021-09-16 RX ORDER — MIDAZOLAM HYDROCHLORIDE 1 MG/ML
.25-5 INJECTION, SOLUTION INTRAMUSCULAR; INTRAVENOUS AS NEEDED
Status: DISCONTINUED | OUTPATIENT
Start: 2021-09-16 | End: 2021-09-16 | Stop reason: HOSPADM

## 2021-09-16 RX ORDER — SODIUM CHLORIDE 9 MG/ML
INJECTION, SOLUTION INTRAVENOUS
Status: DISCONTINUED | OUTPATIENT
Start: 2021-09-16 | End: 2021-09-16 | Stop reason: HOSPADM

## 2021-09-16 RX ORDER — EPHEDRINE SULFATE/0.9% NACL/PF 50 MG/5 ML
SYRINGE (ML) INTRAVENOUS AS NEEDED
Status: DISCONTINUED | OUTPATIENT
Start: 2021-09-16 | End: 2021-09-16 | Stop reason: HOSPADM

## 2021-09-16 RX ORDER — PHENYLEPHRINE HCL IN 0.9% NACL 0.4MG/10ML
SYRINGE (ML) INTRAVENOUS AS NEEDED
Status: DISCONTINUED | OUTPATIENT
Start: 2021-09-16 | End: 2021-09-16 | Stop reason: HOSPADM

## 2021-09-16 RX ORDER — SODIUM CHLORIDE 9 MG/ML
50 INJECTION, SOLUTION INTRAVENOUS CONTINUOUS
Status: DISPENSED | OUTPATIENT
Start: 2021-09-16 | End: 2021-09-16

## 2021-09-16 RX ORDER — POLYETHYLENE GLYCOL 3350 17 G/17G
17 POWDER, FOR SOLUTION ORAL
Qty: 90 PACKET | Refills: 0 | Status: SHIPPED
Start: 2021-09-16

## 2021-09-16 RX ADMIN — Medication 80 MCG: at 14:24

## 2021-09-16 RX ADMIN — LOSARTAN POTASSIUM 100 MG: 50 TABLET, FILM COATED ORAL at 09:41

## 2021-09-16 RX ADMIN — SODIUM CHLORIDE: 9 INJECTION, SOLUTION INTRAVENOUS at 14:09

## 2021-09-16 RX ADMIN — Medication 120 MCG: at 14:18

## 2021-09-16 RX ADMIN — FINASTERIDE 5 MG: 5 TABLET, FILM COATED ORAL at 09:41

## 2021-09-16 RX ADMIN — Medication 10 MG: at 14:15

## 2021-09-16 RX ADMIN — PROPOFOL INJECTABLE EMULSION 20 MG: 10 INJECTION, EMULSION INTRAVENOUS at 14:18

## 2021-09-16 RX ADMIN — Medication 10 MG: at 14:18

## 2021-09-16 RX ADMIN — POLYETHYLENE GLYCOL 3350, SODIUM SULFATE, SODIUM CHLORIDE, POTASSIUM CHLORIDE, ASCORBIC ACID, SODIUM ASCORBATE 1 L: KIT at 06:34

## 2021-09-16 RX ADMIN — Medication 120 MCG: at 14:15

## 2021-09-16 RX ADMIN — PROPOFOL INJECTABLE EMULSION 20 MG: 10 INJECTION, EMULSION INTRAVENOUS at 14:24

## 2021-09-16 RX ADMIN — PROPOFOL INJECTABLE EMULSION 20 MG: 10 INJECTION, EMULSION INTRAVENOUS at 14:27

## 2021-09-16 RX ADMIN — ALLOPURINOL 600 MG: 300 TABLET ORAL at 09:41

## 2021-09-16 RX ADMIN — PROPOFOL INJECTABLE EMULSION 100 MG: 10 INJECTION, EMULSION INTRAVENOUS at 14:12

## 2021-09-16 RX ADMIN — DOXAZOSIN 8 MG: 2 TABLET ORAL at 09:41

## 2021-09-16 RX ADMIN — Medication 120 MCG: at 14:27

## 2021-09-16 RX ADMIN — SODIUM CHLORIDE 50 ML/HR: 9 INJECTION, SOLUTION INTRAVENOUS at 13:43

## 2021-09-16 RX ADMIN — AMLODIPINE BESYLATE 10 MG: 5 TABLET ORAL at 09:41

## 2021-09-16 NOTE — PROCEDURES
[address] [facility] [facility] TYE Tijerina MD  [phone]      2021    Colonoscopy Procedure Note  Vic Newman  :  1950  Rachel Medical Record Number: [id number]    Indications:     Abdominal pain, generalized, Personal history of colon polyps (screening only)  PCP:  Dudley Whitt MD  Anesthesia/Sedation: Conscious Sedation/Moderate Sedation/GETA, see notes  Endoscopist:  Dr. Kate Valladares  Complications:  None  Estimated Blood Loss:  None    Permit:  The indications, risks, benefits and alternatives were reviewed with the patient or their decision maker who was provided an opportunity to ask questions and all questions were answered. The specific risks of colonoscopy with conscious sedation were reviewed, including but not limited to anesthetic complication, bleeding, adverse drug reaction, missed lesion, infection, IV site reactions, and intestinal perforation which would lead to the need for surgical repair. Alternatives to colonoscopy including radiographic imaging, observation without testing, or laboratory testing were reviewed including the limitations of those alternatives. After considering the options and having all their questions answered, the patient or their decision maker provided both verbal and written consent to proceed. Procedure in Detail:  After obtaining informed consent, positioning of the patient in the left lateral decubitus position, and conduction of a pre-procedure pause or \"time out\" the endoscope was introduced into the anus and advanced to the cecum, which was identified by the ileocecal valve and appendiceal orifice. The quality of the colonic preparation was adequate. A careful inspection was made as the colonoscope was withdrawn, findings and interventions are described below. Findings:    There is a tattoo in the descending/transverse junction presumably placed at the time of his last colonoscopy with polypectomy. On today's exam -- no residual neoplasia or polyps at this location or anywhere within the colon. No stricture, mass lesion, or inflammatory change of the colon. Specimens:    none    Complications:   None; patient tolerated the procedure well. Impression:  Otherwise normal colonoscopy to the cecum. I suspect his previous abdominal pain was related to constipation, and with colonoscopy prep he has been completely evacuated. Recommendations:      - OK for discharge from GI perspective. Thank you for entrusting me with this patient's care. Please do not hesitate to contact me with any questions or if I can be of assistance with any of your other patients' GI needs. Signed By: Stephanie Moore MD                        September 16, 2021      Surgical assistant none. Implants none unless specified.

## 2021-09-16 NOTE — PERIOP NOTES
TRANSFER - IN REPORT:    Verbal report received from  TEODORA Wetzel (name) on Stephanie Poole  being received from 5th floor, bed 518 (unit) for routine progression of care      Report consisted of patients Situation, Background, Assessment and   Recommendations(SBAR). Information from the following report(s) SBAR, Procedure Summary and Recent Results was reviewed with the receiving nurse. Opportunity for questions and clarification was provided. Assessment completed upon patients arrival to unit and care assumed.

## 2021-09-16 NOTE — PROGRESS NOTES
Physical Therapy Note:  Chart reviewed and attempted to see patient who is ROSSY for a colonoscopy. Will defer and follow up as able after he returns.   Margarita Short, PT, DPT

## 2021-09-16 NOTE — PROGRESS NOTES
Discharge orders in    Discharge education, instructions, and documents given to daughter Vita Shine) and patient and verbalized understanding. IV cannula aseptically removed, tip intact    No pain. No distress. Respirations even and unlabored.     Tele removed

## 2021-09-16 NOTE — PROGRESS NOTES
Bedside, Verbal and Written shift change report given to Damari RN (oncoming nurse) by Patricia Cherry RN (offgoing nurse). Report included the following information SBAR, Kardex, ED Summary, Procedure Summary, Intake/Output, MAR, Recent Results and Med Rec Status.

## 2021-09-16 NOTE — PROGRESS NOTES
Sharad Sahu  1950  [id number]    Situation:  Verbal report received from: Michelle Cazares RN   Procedure: Procedure(s):  COLONOSCOPY    Background:    Preoperative diagnosis: change in bowel habits  Postoperative diagnosis: 1- Normal Colonoscopy      :  Dr. Connye Hammans  Assistant(s): Endoscopy RN-1: Bailey Willis    Specimens: * No specimens in log *  H. Pylori  no    Assessment:    Anesthesia gave intra-procedure sedation and medications, see anesthesia flow sheet no    Intravenous fluids: NS@ KVO     Vital signs stable     Abdominal assessment: round and soft     Recommendation:  Discharge patient per MD order.   Return to floor  Family or Friend   Permission to share finding with family or friend yes

## 2021-09-16 NOTE — PROGRESS NOTES
Spoke with Radha from Endo, said patient may take all PO meds. Lovenox after procedure.     Patient scheduled for colonoscopy at 2pm.

## 2021-09-16 NOTE — DISCHARGE INSTRUCTIONS
HOSPITALIST DISCHARGE INSTRUCTIONS  NAME: Luis Manuel Gregory   :  1950   MRN:  [id number]     Date/Time:  2021 4:52 PM    ADMIT DATE: 2021     DISCHARGE DATE: 2021     ADMITTING DIAGNOSIS:  Weakness    DISCHARGE DIAGNOSIS:  Polyradiculoneuropathy / Guillain-Barré syndrome    MEDICATIONS:    · It is important that you take the medication exactly as they are prescribed. · Keep your medication in the bottles provided by the pharmacist and keep a list of the medication names, dosages, and times to be taken in your wallet. · Do not take other medications without consulting your doctor     Pain Management: per above medications    What to do at Home    Recommended diet:  Cardiac Diet     Recommended activity: Activity as tolerated    1) Return to the hospital if you feel worse    2) If you experience any of the following symptoms then please call your primary care physician or return to the emergency room if you cannot get hold of your doctor:  Fever, chills, nausea, vomiting, diarrhea, change in mentation, falling, bleeding, shortness of breath, chest pain, severe headache, severe abdominal pain. Follow Up: Follow-up Information     Follow up With Specialties Details Why Contact Info    Bony Menendez MD Neurology In 3 weeks Call to schedule Hospital Follow Up Visit with Dr Ne Ospina; Regarding Guillain-[place] Syndrome [address]  [facility] [place]      Nitin Jimenez MD Internal Medicine Schedule an appointment as soon as possible for a visit in 1 week Hospital Follow UP  [address]  [phone]      Linda Nogueira MD Gastroenterology  As needed [facility]  [phone]              Information obtained by :  I understand that if any problems occur once I am at home I am to contact my physician. I understand and acknowledge receipt of the instructions indicated above. Physician's or R.N.'s Signature                                                                  Date/Time                                                                                                                                              Patient or Representative Signature                                                          Date/Time

## 2021-09-16 NOTE — PERIOP NOTES
TRANSFER - OUT REPORT:    Verbal report given to  TEODORA Wetzel (name) on John Cobb  being transferred to 5th floor, [id number] (unit) for routine progression of care       Report consisted of patients Situation, Background, Assessment and   Recommendations(SBAR). Information from the following report(s) SBAR, Procedure Summary and Recent Results was reviewed with the receiving nurse. Lines:   Peripheral IV 09/16/21 Left Wrist (Active)   Site Assessment Clean, dry, & intact 09/16/21 1335   Phlebitis Assessment 0 09/16/21 1335   Infiltration Assessment 0 09/16/21 1335   Dressing Status Clean, dry, & intact 09/16/21 1335   Dressing Type Transparent 09/16/21 1335   Hub Color/Line Status Pink;Patent; Flushed; Infusing 09/16/21 1335   Alcohol Cap Used Yes 09/16/21 1335        Opportunity for questions and clarification was provided.       Patient transported with:   Registered Nurse  Tech

## 2021-09-16 NOTE — PERIOP NOTES
Received report from Thomas Remy CRNA. See anesthesia record. Patient taken to post-recovery. Post-recovery report given to Foster Menendez RN. Patient's ABD remains soft and non-tender post procedure. Pt has no complaints at this time and tolerated the procedure well. Endoscope was pre-cleaned at bedside immediately following procedure by Phong Hudson RN.

## 2021-09-16 NOTE — DISCHARGE SUMMARY
Vinicio Levy [address]  [address]  [phone]    Physician Discharge Summary     Patient ID:  Gildardo Walker  [id number]  89 y.o.  1950    Admit date: 9/9/2021    Discharge date and time: 9/16/2021 4:57 PM    Admission Diagnoses: Guillain Barré syndrome ([facility] [address] [G61.0]    Discharge Diagnoses:  Principal Diagnosis <principal problem not specified>                                            Active Problems:    Guillain Barré syndrome ([address] (9/9/2021)           Hospital Course:     65 yo M w/ hx of HTN, BPH, recent L-spine laminectomy presenting w/ UE weakness, numbness, admitted for GBS     Acute abdominal pain/distention / constipation: likely related to GBS / dysautonomia as pt has constipation and urinary retention. Patient does has have a history of colon polyps. CT abdomen pelvis 9/15 showing no acute concerns. Continue aggressive bowel regimen. Bladder scans as needed. GI evaluated; S/p colonoscopy 9/16 with no new polyps. F/u PCP and GI OP as needed.      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 S/p IVIG x 5 days. DC to rehab.      HTN: Cont Norvasc, losartan, Aldactone. F/u PCP.     BPH: cont doxazosin and finasteride. F/u PCP. PCP: Yeison Perea MD     Consults: GI and Neurology    Significant Diagnostic Studies:     EGD  Findings: There is a tattoo in the descending/transverse junction presumably placed at the time of his last colonoscopy with polypectomy. On today's exam -- no residual neoplasia or polyps at this location or anywhere within the colon. No stricture, mass lesion, or inflammatory change of the colon. Discharge Exam:  Physical Exam:    Gen:  Well-developed, well-nourished, in no acute distress. Pleasant   HEENT:  Atraumatic, normocephalic. Sclerae nonicteric, hearing intact to voice  Neck:  Supple, without apparent masses.   Resp:  No accessory muscle use, CTAB without wheezes, rales, or rhonchi  Card: RRR, without m/r/g. No LE edema  Abd:  +bowel sounds, soft, NTTP, distended  Neuro: Face symmetric, speech fluent, follows commands appropriately, reduced UE strength R>L   Psych:  Alert, oriented x 3. Good insight    Disposition: SNF  Discharge Condition: Stable    Patient Instructions:   Current Discharge Medication List      START taking these medications    Details   polyethylene glycol (MIRALAX) 17 gram packet Take 1 Packet by mouth two (2) times daily as needed for Constipation. Qty: 90 Packet, Refills: 0      senna-docusate (PERICOLACE) 8.6-50 mg per tablet Take 1 Tablet by mouth two (2) times a day. Qty: 90 Tablet, Refills: 0         CONTINUE these medications which have NOT CHANGED    Details   doxazosin (CARDURA) 8 mg tablet Take 8 mg by mouth daily. losartan (COZAAR) 100 mg tablet Take 100 mg by mouth daily. Indications: high blood pressure      amLODIPine (Norvasc) 10 mg tablet Take 10 mg by mouth daily. spironolactone (ALDACTONE) 25 mg tablet Take 25 mg by mouth daily. allopurinoL (ZYLOPRIM) 300 mg tablet Take 600 mg by mouth daily. finasteride (PROSCAR) 5 mg tablet Take 5 mg by mouth daily. vitamin E mixed/tocotrienol (VITAMIN E COMPLEX PO) Take 184 mg by mouth daily. garlic 1,797 mg cap Take 1 Tablet by mouth daily. multivitamin (Men's Multi-Vitamin) tablet Take 1 Tablet by mouth daily. omega 3-dha-epa-fish oil (Fish Oil) 100-160-1,000 mg cap Take 1 Capsule by mouth daily.          STOP taking these medications       docusate sodium (COLACE) 100 mg capsule Comments:   Reason for Stopping:             Activity: Activity as tolerated  Diet: Cardiac Diet  Wound Care: None needed    Follow-up with  Follow-up Information     Follow up With Specialties Details Why Contact Info    Sasha Meza MD Neurology In 3 weeks Call to schedule Hospital Follow Up Visit with Dr Bridget Harry; Regarding Guillain-[place] Syndrome [phone] [facility]  [address]  [address] [facility]  [phone]      Alexandra Elias MD Internal Medicine Schedule an appointment as soon as possible for a visit in 1 week Hospital Follow UP  [address]  [phone]      Odilia Ascencio MD Gastroenterology  As needed [address] [facility]  [phone]            Follow-up tests/labs as above.      Signed:  Gisele Camargo DO  9/16/2021  4:57 PM  **I personally spent 35 min on discharge**

## 2021-09-16 NOTE — PROGRESS NOTES
9/16/2021  Case Management Progress Note    4:21 PM  Patient can go to [address], family will just have to drop him off and not be able to visit until covid test comes back. I have informed them of this and they are understanding. Patient's daughter is in a meeting right now and mailbox is full however son in law is at bedside and willing to transport as needed. RN aware. MD aware. RN please call report to [phone]. Patient will be going to room 2050. Admitting MD is Dr. Verónica Messina. COURTNEY West    4:05 PM  [facility] has a bed tonight however they cannot take the patient until the covid test is back--it is not yet. [facility] does not have any beds tonight. Will speak with [facility] DE[facility] and see what the options are. COURTNEY West    3:38 PM  Per MD patient is medically clear from colonoscopy, no acute findings. I have left a message for Kalyani Martin w/ [facility] to see if there are any beds tonight. I also left a message for Giuseppe Gerard w/ [facility] to see if they could take him tonight. 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 He may be ready for discharge after that. Per [facility] DE[facility] she is as yet unsure of bed availability at this time but not particularly hopeful. We will check in again this afternoon. Patient's daughter reiterated to me again that preference is for [facility] when [facility] was suggested as an alternative. Family will transport. Will continue to follow. Transition of Care Plan   1. Continue medical management/treatment; colonoscopy this afternoon  2. May be ready for discharge after that  3. Will check in again with [facility]   4. Family to transport  5.  CM will continue to follow    COURTNEY West

## 2021-09-17 LAB
SARS-COV-2, XPLCVT: NOT DETECTED
SOURCE, COVRS: NORMAL

## 2021-09-27 LAB
ACHR BIND AB SER-SCNC: 0.05 NMOL/L (ref 0–0.24)
ACHR BLOCK AB SER-ACNC: 17 % (ref 0–25)
ACHR MOD AB/ACHR TOTAL SFR SER: <12 % (ref 0–20)
ANA SER QL: NEGATIVE
CK SERPL-CCNC: 283 U/L (ref 41–331)
ERYTHROCYTE [SEDIMENTATION RATE] IN BLOOD BY WESTERGREN METHOD: 93 MM/HR (ref 0–30)
FOLATE SERPL-MCNC: 15.6 NG/ML
MUSK AB SER-SCNC: <1 U/ML
REFLEX INFORMATION: NORMAL
STRIA MUS AB TITR SER IF: NEGATIVE {TITER}
TSH SERPL DL<=0.005 MIU/L-ACNC: 2.13 UIU/ML (ref 0.45–4.5)
VIT B12 SERPL-MCNC: >2000 PG/ML (ref 232–1245)
VIT B6 SERPL-MCNC: 40 UG/L (ref 5.3–46.7)

## 2021-09-28 ENCOUNTER — TELEPHONE (OUTPATIENT)
Dept: NEUROLOGY | Age: 71
End: 2021-09-28

## 2021-09-28 NOTE — TELEPHONE ENCOUNTER
----- Message from Luz Guzmán sent at 9/28/2021  9:37 AM EDT -----  Regarding: Dr. Mitzi Stewart / Telephone  Appointment not available    Caller's first and last name and relationship to patient (if not the patient): Lambert Saldana, daughter      Mychal Guerra contact number: [phone]      Preferred date and time: n/a      Scheduled appointment date and time: 11/1/2021 at 3:40pm      Reason for appointment: F/up from hospital discharge      Details to clarify the request: caller stated the pt is suppose to have a f/up appt made 3 weeks from his hospital discharge which was on 9/16/2021. While Dr. Mitzi Stewart was out, Dr. Julienne Monson in the hospital stated this needed to be done for a f/up to check for any progression and to determine if further treatment is needed.       Luz Guzmán

## 2021-09-30 NOTE — TELEPHONE ENCOUNTER
Contacted pt's daughter to inform that per Dr. Nakul Wyatt appointment on 11/1 is fine, does not need to be moved up. Pt's daughter stated understanding.

## 2021-11-19 ENCOUNTER — OFFICE VISIT (OUTPATIENT)
Dept: NEUROLOGY | Age: 71
End: 2021-11-19
Payer: MEDICARE

## 2021-11-19 VITALS
OXYGEN SATURATION: 100 % | TEMPERATURE: 98 F | BODY MASS INDEX: 27.1 KG/M2 | DIASTOLIC BLOOD PRESSURE: 62 MMHG | HEART RATE: 73 BPM | WEIGHT: 173 LBS | SYSTOLIC BLOOD PRESSURE: 120 MMHG

## 2021-11-19 DIAGNOSIS — G61.0 GUILLAIN BARRÉ SYNDROME (HCC): Primary | ICD-10-CM

## 2021-11-19 PROCEDURE — 1101F PT FALLS ASSESS-DOCD LE1/YR: CPT | Performed by: PSYCHIATRY & NEUROLOGY

## 2021-11-19 PROCEDURE — G8427 DOCREV CUR MEDS BY ELIG CLIN: HCPCS | Performed by: PSYCHIATRY & NEUROLOGY

## 2021-11-19 PROCEDURE — G8510 SCR DEP NEG, NO PLAN REQD: HCPCS | Performed by: PSYCHIATRY & NEUROLOGY

## 2021-11-19 PROCEDURE — G8419 CALC BMI OUT NRM PARAM NOF/U: HCPCS | Performed by: PSYCHIATRY & NEUROLOGY

## 2021-11-19 PROCEDURE — G8536 NO DOC ELDER MAL SCRN: HCPCS | Performed by: PSYCHIATRY & NEUROLOGY

## 2021-11-19 PROCEDURE — 99215 OFFICE O/P EST HI 40 MIN: CPT | Performed by: PSYCHIATRY & NEUROLOGY

## 2021-11-19 PROCEDURE — 3017F COLORECTAL CA SCREEN DOC REV: CPT | Performed by: PSYCHIATRY & NEUROLOGY

## 2021-11-19 RX ORDER — KETOROLAC TROMETHAMINE 10 MG/1
10 TABLET, FILM COATED ORAL
COMMUNITY
End: 2022-02-18

## 2021-11-19 RX ORDER — CIPROFLOXACIN 500 MG/1
500 TABLET ORAL 2 TIMES DAILY
COMMUNITY
End: 2022-02-18

## 2021-11-19 RX ORDER — NAPROXEN SODIUM 220 MG
220 TABLET ORAL 2 TIMES DAILY WITH MEALS
COMMUNITY

## 2021-11-19 NOTE — PROGRESS NOTES
Neurology Progress Note    Patient ID:  Lashawn Watson  [id number]  39 y.o.  1950      Subjective:   History:  Lashawn Watson is a 66 y.o. male who  has a past medical history of Anemia (05/07/2021), BPH (benign prostatic hyperplasia), History of gout, HTN (hypertension), Low vitamin D level, and MSSA (methicillin-susceptible Staphylococcus aureus) colonization (05/06/2021). who was doing fine after L3- L5 Lower back surgery May 17, 2021 c/o Dr Angelica Arnold and had a follow up with Dr Angelica Arnold Aug 26, 2021. Was complaining of neck pain and weakness of the R UE since July and so was examined by Dr Angelica Arnold who \"twisted his neck\". A few hours after seeing Dr Angelica Arnold, patient noted weakness of the R LE described as tight muscles of the thigh. L leg is okay. Currently, patient noted progressive weakness of both UE, R worse than L with more weakness of the R LE. (+) numbness of finger tips of both hand. Consideration includes Guillain barre syndrome (absent reflexes in LE which daughter said he had before) vs CIDP. Evaluate for other possible causes. After getting EMG, patient was [facility] at [facility] 9/10/21 for GBS and was started on IVIG. Patient gradually improved. [facility] Patient feels like he is 95% better. Just has some lower back pain and some numbness of R lateral leg needing to use a cane.     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  ciprofloxacin HCl (CIPRO) 500 mg tablet Take 500 mg by mouth two (2) times a day.  ketorolac (TORADOL) 10 mg tablet Take 10 mg by mouth every six (6) hours as needed for Pain.  polyethylene glycol (MIRALAX) 17 gram packet Take 1 Packet by mouth two (2) times daily as needed for Constipation. 90 Packet 0    senna-docusate (PERICOLACE) 8.6-50 mg per tablet Take 1 Tablet by mouth two (2) times a day. 90 Tablet 0    losartan (COZAAR) 100 mg tablet Take 100 mg by mouth daily. Indications: high blood pressure      amLODIPine (Norvasc) 10 mg tablet Take 10 mg by mouth daily.  spironolactone (ALDACTONE) 25 mg tablet Take 25 mg by mouth daily.  doxazosin (CARDURA) 8 mg tablet Take 8 mg by mouth daily.  allopurinoL (ZYLOPRIM) 300 mg tablet Take 600 mg by mouth daily.  finasteride (PROSCAR) 5 mg tablet Take 5 mg by mouth daily.  vitamin E mixed/tocotrienol (VITAMIN E COMPLEX PO) Take 184 mg by mouth daily.  garlic 9,223 mg cap Take 1 Tablet by mouth daily.  multivitamin (Men's Multi-Vitamin) tablet Take 1 Tablet by mouth daily.  omega 3-dha-epa-fish oil (Fish Oil) 100-160-1,000 mg cap Take 1 Capsule by mouth daily. No current facility-administered medications on file prior to visit. Imaging:    CT Results (recent):  Results from Hospital Encounter encounter on 09/09/21    CT ABD PELV WO CONT    Narrative  EXAM: CT ABD PELV WO CONT    INDICATION: Abdominal pain    COMPARISON: 8/28/2021    CONTRAST:  None. TECHNIQUE:  Thin axial images were obtained through the abdomen and pelvis. Coronal and  sagittal reformats were generated. Oral contrast was not administered. CT dose  reduction was achieved through use of a standardized protocol tailored for this  examination and automatic exposure control for dose modulation.     The absence of intravenous contrast material reduces the sensitivity for  evaluation of the vasculature and solid organs. FINDINGS:  LOWER THORAX: No significant abnormality in the incidentally imaged lower chest.  LIVER: Several small hypodensities in the liver are too small to fully  characterize, but likely represent small cysts. BILIARY TREE: Gallbladder is within normal limits. CBD is not dilated. SPLEEN: within normal limits. PANCREAS: No focal abnormality. ADRENALS: Unremarkable. KIDNEYS/URETERS: There is a 1.4 cm cyst in the lower pole of the right kidney. There is a 2.4 cm cyst lower pole of left kidney. There is an incidental small  cortical calcification the superior left kidney. No hydronephrosis. No  nephrolithiasis. STOMACH: Unremarkable. SMALL BOWEL: No dilatation or wall thickening. COLON: No dilatation or wall thickening. A mild to moderate amount of stool is  seen in the colon. APPENDIX: Unremarkable. PERITONEUM: No ascites or pneumoperitoneum. RETROPERITONEUM: No lymphadenopathy or aortic aneurysm. REPRODUCTIVE ORGANS: Unremarkable. URINARY BLADDER: No mass or calculus. BONES: No destructive bone lesion. The patient is status post posterior fusion  from L3 to L5. ABDOMINAL WALL: No mass or hernia. ADDITIONAL COMMENTS: N/A    Impression  No evidence of acute process. MRI Results (recent):  Results from [place] encounter on 08/27/21    MRI CERV SPINE WO CONT    Narrative  EXAM: MRI CERV SPINE WO CONT  Clinical history: Cervical radiculopathy  INDICATION: . Cervical radiculopathy    COMPARISON: None    TECHNIQUE: MR imaging of the cervical spine was performed using the following  sequences: sagittal T1, T2, STIR;  axial T2, T1.    CONTRAST:  None. FINDINGS:    There is congenital narrowing of the cervical canal. B cervical lordosis. Vertebral body heights are maintained. Discogenic degenerative changes. The craniocervical junction is intact.  The course, caliber, and signal intensity  of the spinal cord are normal.    The paraspinal soft tissues are within normal limits. C2-C3: Facet arthropathy is mild. Disc bulge/osteophyte. Mild canal stenosis. Foramina are patent    C3-C4: Mild facet arthropathy. Disc bulge/osteophyte. Mild canal stenosis. Moderate bilateral foraminal stenoses. C4-C5: Facet arthropathy is slightly greater on the left. Disc desiccation and  disc bulge. Moderate canal and bilateral foraminal stenoses. C5-C6: Mild facet arthropathy. Disc bulge/osteophyte. Mild canal, moderate to  severe right and moderate left foraminal stenosis. C6-C7: Disc desiccation. Mild facet arthropathy. Canal is patent. Mild to  moderate left foraminal stenosis. C7-T1: Disc protrusion/osteophyte at the right neural foramen. Mild canal  stenosis. Moderate to severe right foraminal stenosis. There is incidentally noted lipoma in the pericervical tissue on the right. Impression  Multilevel minimal degenerative change with moderate congenital narrowing  cervical canal.  Moderate canal and bilateral foraminal stenosis at C4-5. Mild canal, moderate to severe right and moderate left foraminal stenosis at  C5-6. Mild canal stenoses at C3-4, C7-T1. Moderate to severe foraminal stenosis at C7-T1. IR Results (recent):  No results found for this or any previous visit. VAS/US Results (recent):  No results found for this or any previous visit. Reviewed records in [facility] tab today    Lab Review     No results displayed because visit has over 200 results.       Office Visit on 09/08/2021   Component Date Value Ref Range Status    Creatine Kinase,Total 09/08/2021 283  41 - 331 U/L Final    TSH 09/08/2021 2.130  0.450 - 4.500 uIU/mL Final    AChR Binding Ab, serum 09/08/2021 0.05  0.00 - 0.24 nmol/L Final    Comment:                                Negative:   0.00 - 0.24                                 Borderline: 0.25 - 0.40                                 Positive:         >0.40      AChR Blocking Ab 09/08/2021 17  0 - 25 % Final Comment:                                Negative:      0 - 25                                 Borderline:   26 - 30                                 Positive:         >30      AChR Modulating Ab 09/08/2021 <12  0 - 20 % Final    Comment:                                Negative:         <21                                 Equivocal:    21 - 25                                 Positive:         >25      Striation Abs 09/08/2021 Negative  Neg:<1:40 Final    REFLEX INFORMATION 09/08/2021 Comment   Final    Reflex test indicated.  Antinuclear Antibodies Direct 09/08/2021 Negative  Negative Final    Sed rate (ESR) 09/08/2021 93* 0 - 30 mm/hr Final    Vitamin B12 09/08/2021 >2000* 232 - 1245 pg/mL Final    Folate 09/08/2021 15.6  >3.0 ng/mL Final    Comment: A serum folate concentration of less than 3.1 ng/mL is  considered to represent clinical deficiency.  Vitamin B6 09/08/2021 40.0  5.3 - 46.7 ug/L Final    MuSK Abs 09/08/2021 <1.0  U/mL Final    Comment:   Reference Range:    Negative: <1.0    Positive: 1.0 or higher    This test was developed and its performance characteristics  determined by [facility]. It has not been cleared or approved  by the Food and Drug Administration.      Admission on 08/28/2021, Discharged on 08/28/2021   Component Date Value Ref Range Status    Ventricular Rate 08/28/2021 77  BPM Final    Atrial Rate 08/28/2021 77  BPM Final    P-R Interval 08/28/2021 132  ms Final    QRS Duration 08/28/2021 84  ms Final    Q-T Interval 08/28/2021 386  ms Final    QTC Calculation (Bezet) 08/28/2021 436  ms Final    Calculated P Axis 08/28/2021 54  degrees Final    Calculated R Axis 08/28/2021 0  degrees Final    Calculated T Axis 08/28/2021 31  degrees Final    Diagnosis 08/28/2021    Final                    Value:Normal sinus rhythm  Minimal voltage criteria for LVH, may be normal variant ( R in aVL )  Nonspecific T wave abnormality  Abnormal ECG  When compared with ECG of 18-AUG-2021 13:26,  No significant change was found  Confirmed by Beatriz Caban MD. ([id number]) on 8/30/2021 12:36:28 PM      WBC 08/28/2021 8.1  4.1 - 11.1 K/uL Final    RBC 08/28/2021 3.67* 4.10 - 5.70 M/uL Final    HGB 08/28/2021 11.0* 12.1 - 17.0 g/dL Final    HCT 08/28/2021 33.3* 36.6 - 50.3 % Final    MCV 08/28/2021 90.7  80.0 - 99.0 FL Final    MCH 08/28/2021 30.0  26.0 - 34.0 PG Final    MCHC 08/28/2021 33.0  30.0 - 36.5 g/dL Final    RDW 08/28/2021 16.9* 11.5 - 14.5 % Final    PLATELET 71/16/4918 202  150 - 400 K/uL Final    MPV 08/28/2021 10.3  8.9 - 12.9 FL Final    NRBC 08/28/2021 0.0  0.0  WBC Final    ABSOLUTE NRBC 08/28/2021 0.00  0.00 - 0.01 K/uL Final    NEUTROPHILS 08/28/2021 65  32 - 75 % Final    LYMPHOCYTES 08/28/2021 21  12 - 49 % Final    MONOCYTES 08/28/2021 11  5 - 13 % Final    EOSINOPHILS 08/28/2021 2  0 - 7 % Final    BASOPHILS 08/28/2021 0  0 - 1 % Final    IMMATURE GRANULOCYTES 08/28/2021 0  0 - 0.5 % Final    ABS. NEUTROPHILS 08/28/2021 5.3  1.8 - 8.0 K/UL Final    ABS. LYMPHOCYTES 08/28/2021 1.7  0.8 - 3.5 K/UL Final    ABS. MONOCYTES 08/28/2021 0.9  0.0 - 1.0 K/UL Final    ABS. EOSINOPHILS 08/28/2021 0.2  0.0 - 0.4 K/UL Final    ABS. BASOPHILS 08/28/2021 0.0  0.0 - 0.1 K/UL Final    ABS. IMM.  GRANS. 08/28/2021 0.0  0.00 - 0.04 K/UL Final    DF 08/28/2021 AUTOMATED    Final    Sodium 08/28/2021 137  136 - 145 mmol/L Final    Potassium 08/28/2021 4.6  3.5 - 5.1 mmol/L Final    Chloride 08/28/2021 99  97 - 108 mmol/L Final    CO2 08/28/2021 28  21 - 32 mmol/L Final    Anion gap 08/28/2021 10  5 - 15 mmol/L Final    Glucose 08/28/2021 110* 65 - 100 mg/dL Final    BUN 08/28/2021 15  6 - 20 MG/DL Final    Creatinine 08/28/2021 1.12  0.70 - 1.30 MG/DL Final    BUN/Creatinine ratio 08/28/2021 13  12 - 20   Final    GFR est AA 08/28/2021 >60  >60 ml/min/1.73m2 Final    GFR est non-AA 08/28/2021 >60  >60 ml/min/1.73m2 Final    Estimated GFR is calculated using the IDMS-traceable Modification of Diet in Renal Disease (MDRD) Study equation, reported for both  Americans (GFRAA) and non- Americans (GFRNA), and normalized to 1.73m2 body surface area. The physician must decide which value applies to the patient.  Calcium 08/28/2021 9.5  8.5 - 10.1 MG/DL Final    Bilirubin, total 08/28/2021 0.3  0.2 - 1.0 MG/DL Final    ALT (SGPT) 08/28/2021 42  12 - 78 U/L Final    AST (SGOT) 08/28/2021 16  15 - 37 U/L Final    Alk. phosphatase 08/28/2021 64  45 - 117 U/L Final    Protein, total 08/28/2021 8.1  6.4 - 8.2 g/dL Final    Albumin 08/28/2021 4.0  3.5 - 5.0 g/dL Final    Globulin 08/28/2021 4.1* 2.0 - 4.0 g/dL Final    A-G Ratio 08/28/2021 1.0* 1.1 - 2.2   Final    Lipase 08/28/2021 121  73 - 393 U/L Final    Troponin-I, Qt. 08/28/2021 <0.05  <0.05 ng/mL Final    Comment: The presence of detectable troponin above the reference range indicates myocardial injury which may be due to ischemia, myocarditis, trauma, etc.  Clinical correlation is necessary to establish the significance of this finding. Sequential testing is recommended to determine if the typical rise and fall of cTnI is demonstrated. Note:  Cardiac troponin I has a relatively long half life and may be present well after the CK MB has returned to baseline. The reference range is based on the 99th percentile of the referent population.  Magnesium 08/28/2021 2.1  1.6 - 2.4 mg/dL Final    NT pro-BNP 08/28/2021 172* 0 - 125 PG/ML Final    Comment:      NT-proBNP is highly sensitive for the detection of acute congestive heart failure in dyspneic patients. A NT-proBNP result <300 pg/mL has a negative predictive value of 98% for acute heart failure.   Marked elevations in NT-proBNP levels may be observed in patients with left ventricular congestive failure, atrial fibrillation without clear heart failure, acute coronary syndromes, right heart strain/failure (including pulmonary embolism and cor pulmonale), critical illness, renal failure or advanced age. Falsely low NT-proBNP may be observed in obese CHF patients. Recent research  suggests the following cutoff values are appropriate based on patient age and clinical setting, reduce false positive (FP) results, and improve positive predictive values for acute heart failure:  Acutely dyspneic patient: age <50, use cutoff of 450 pg/mL  Acutely dyspneic patient: age 54-65, use cutoff of 900 pg/mL  Acutely dyspneic patient: age                            >76, use cutoff of 1800 pg/mL       FDA approved cutpoints for ruling-out heart failure in the office:  Ambulatory patient: age <73, use cutoff of 125 pg/mL  Ambulatory patient: age >76, use cutoff of 450 pg/mL      Color 08/28/2021 YELLOW/STRAW    Final    Color Reference Range: Straw, Yellow or Dark Yellow    Appearance 08/28/2021 HAZY* CLEAR   Final    Specific gravity 08/28/2021 1.010  1.003 - 1.030   Final    pH (UA) 08/28/2021 6.0  5.0 - 8.0   Final    Protein 08/28/2021 Negative  NEG mg/dL Final    Glucose 08/28/2021 Negative  NEG mg/dL Final    Ketone 08/28/2021 Negative  NEG mg/dL Final    Bilirubin 08/28/2021 Negative  NEG   Final    Blood 08/28/2021 Negative  NEG   Final    Urobilinogen 08/28/2021 0.2  0.2 - 1.0 EU/dL Final    Nitrites 08/28/2021 Negative  NEG   Final    Leukocyte Esterase 08/28/2021 Negative  NEG   Final    WBC 08/28/2021 0-4  0 - 4 /hpf Final    RBC 08/28/2021 0-5  0 - 5 /hpf Final    Epithelial cells 08/28/2021 FEW  FEW /lpf Final    Epithelial cell category consists of squamous cells and /or transitional urothelial cells. Renal tubular cells, if present, are separately identified as such.     Bacteria 08/28/2021 Negative  NEG /hpf Final         Objective:       Exam:  Visit Vitals  /62 (BP 1 Location: Right arm, BP Patient Position: Sitting, BP Cuff Size: Large adult)   Pulse 73   Temp 98 °F (36.7 °C) (Temporal)   Wt 78.5 kg (173 lb)   SpO2 100%   BMI 27.10 kg/m²     Gen: Awake, alert, follows commands  Appropriate appearance, normal speech. Oriented to all spheres. No visual field defect on confrontation exam.  Full eyes movement, with no nystagmus, no diplopia, no ptosis. Normal gag and swallow. All remaining cranial nerves were normal  Motor function: 5/5 in all extremities  Sensory: intact to LT, PP and JPS  DTRs trace UE, absent knees and ankles(-) Babinski  Good FTN and HTS   Gait: Able to stand without using arms. Able to stand on toes and heels. Stable gait    Assessment:       ICD-10-CM ICD-9-CM    1. Guillain Barré syndrome (HCC)  G61.0 357.0        Guillain Brookneal syndrome    Lower back pain with history of  L3- L5 Lower back surgery May 17, 2021    Plan:   1.  I had a long discussion with patient and daugther. Discussed diagnosis, prognosis, pathophysiology and available treatment. Reviewed test results. All questions were answered. 2. Discussed motor strength is now 5/5 for all extremities with now intact sensation and so should be able to gradually drive  3. Continue physical therapy  4. Discussed that his lower back pain is more likely related to history of his lower back surgery. Follow up with his ortho Dr Camelia Balbuena regarding lower back issues  5. Reviewed medical records in Epic  6. Patient already got COVID vaccine      Follow-up and Dispositions    · Return in about 3 months (around 2/19/2022).                Kevan Ndiaye MD  Diplomate, American Board of Psychiatry and Neurology  Diplomate, Neuromuscular Medicine  Diplomate, American Board of Electrodiagnostic Medicine

## 2021-11-19 NOTE — PROGRESS NOTES
Chief Complaint   Patient presents with    Follow-up     Follow up from hospital visit for [facility], states improvement, overall doing well per pt     Visit Vitals  /62 (BP 1 Location: Right arm, BP Patient Position: Sitting, BP Cuff Size: Large adult)   Pulse 73   Temp 98 °F (36.7 °C) (Temporal)   Wt 78.5 kg (173 lb)   SpO2 100%   BMI 27.10 kg/m²

## 2021-11-19 NOTE — LETTER
11/19/2021    Patient: Lashawn Watson   YOB: 1950   Date of Visit: 11/19/2021     Karla Blank MD  [address] [place] [address]  [facility] [address]  Via Fax: [phone]    Dear Karla Blank MD,      Thank you for referring Mr. Lashawn Watson to [facility] for evaluation. My notes for this consultation are attached. If you have questions, please do not hesitate to call me. I look forward to following your patient along with you.       Sincerely,    Amparo Portillo MD

## 2022-02-18 ENCOUNTER — OFFICE VISIT (OUTPATIENT)
Dept: NEUROLOGY | Age: 72
End: 2022-02-18
Payer: MEDICARE

## 2022-02-18 VITALS
WEIGHT: 180.5 LBS | DIASTOLIC BLOOD PRESSURE: 88 MMHG | OXYGEN SATURATION: 98 % | BODY MASS INDEX: 28.27 KG/M2 | HEART RATE: 62 BPM | SYSTOLIC BLOOD PRESSURE: 140 MMHG

## 2022-02-18 DIAGNOSIS — M54.2 NECK PAIN: ICD-10-CM

## 2022-02-18 DIAGNOSIS — G61.0 GUILLAIN BARRÉ SYNDROME (HCC): Primary | ICD-10-CM

## 2022-02-18 PROCEDURE — 1101F PT FALLS ASSESS-DOCD LE1/YR: CPT | Performed by: PSYCHIATRY & NEUROLOGY

## 2022-02-18 PROCEDURE — 99215 OFFICE O/P EST HI 40 MIN: CPT | Performed by: PSYCHIATRY & NEUROLOGY

## 2022-02-18 PROCEDURE — G8432 DEP SCR NOT DOC, RNG: HCPCS | Performed by: PSYCHIATRY & NEUROLOGY

## 2022-02-18 PROCEDURE — 3017F COLORECTAL CA SCREEN DOC REV: CPT | Performed by: PSYCHIATRY & NEUROLOGY

## 2022-02-18 PROCEDURE — G8427 DOCREV CUR MEDS BY ELIG CLIN: HCPCS | Performed by: PSYCHIATRY & NEUROLOGY

## 2022-02-18 PROCEDURE — G8536 NO DOC ELDER MAL SCRN: HCPCS | Performed by: PSYCHIATRY & NEUROLOGY

## 2022-02-18 PROCEDURE — G8419 CALC BMI OUT NRM PARAM NOF/U: HCPCS | Performed by: PSYCHIATRY & NEUROLOGY

## 2022-02-18 RX ORDER — CYCLOBENZAPRINE HCL 10 MG
10 TABLET ORAL
Qty: 30 TABLET | Refills: 0 | Status: SHIPPED | OUTPATIENT
Start: 2022-02-18

## 2022-02-18 NOTE — LETTER
2/18/2022    Patient: Payal Mckeon   YOB: 1950   Date of Visit: 2/18/2022     Timmy Garcia MD  [address] [place] [address] [address] [id number]  Via Fax: [phone]    Dear Timmy Garcia MD,      Thank you for referring Mr. Payal Mckeon to [facility] for evaluation. My notes for this consultation are attached. If you have questions, please do not hesitate to call me. I look forward to following your patient along with you.       Sincerely,    Lani Vasquez MD

## 2022-02-18 NOTE — PROGRESS NOTES
Neurology Progress Note    Patient ID:  Amy Haynes  [id number]  82 y.o.  1950      Subjective:   History:  Iliana Roberts a 66 y. o. 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 who was doing fine after L3- L5 Lower back surgery May 17, 2021 c/o Dr Vargas Rios and had a follow up with Dr Vargas Rios Aug 26, 2021. Was complaining of neck pain and weakness of the R UE since July and so was examined by Dr Vargas Rios who \"twisted his neck\". A few hours after seeing Dr Vargas Rios, patient noted weakness of the R LE described as tight muscles of the thigh. L leg is okay. Currently, patient noted progressive weakness of both UE, R worse than L with more weakness of the R LE. (+) numbness of finger tips of both hand. Consideration includes Guillain barre syndrome (absent reflexes in LE which daughter said he had before) vs CIDP. Evaluate for other possible causes. After getting EMG, patient was admitted at [facility] On 9/10/21 for GBS and was started on IVIG. Patient gradually improved. Patient went to [facility] arms.     Patient doing well today. Just have some residual R anterolateral leg numbness (which maybe related more to his lower back issues). On Jan 2022, patient shovelled snow and since then noted L neck pain, non-radiating, 3/10, tight.  Aleve and heat pad helped.       ROS:  Per HPI-  Otherwise the remainder of ROS was negative    Social Hx  Social History     Socioeconomic History    Marital status:    Tobacco Use    Smoking status: Never Smoker    Smokeless tobacco: Never Used   Substance and Sexual Activity    Alcohol use: Yes     Comment: social    Drug use: Never       Meds:  Current Outpatient Medications on File Prior to Visit   Medication Sig Dispense Refill    naproxen sodium (Aleve) 220 mg tablet Take 220 mg by mouth two (2) times daily (with meals).  polyethylene glycol (MIRALAX) 17 gram packet Take 1 Packet by mouth two (2) times daily as needed for Constipation. 90 Packet 0    senna-docusate (PERICOLACE) 8.6-50 mg per tablet Take 1 Tablet by mouth two (2) times a day. 90 Tablet 0    losartan (COZAAR) 100 mg tablet Take 100 mg by mouth daily. Indications: high blood pressure      amLODIPine (Norvasc) 10 mg tablet Take 10 mg by mouth daily.  spironolactone (ALDACTONE) 25 mg tablet Take 25 mg by mouth daily.  doxazosin (CARDURA) 8 mg tablet Take 8 mg by mouth daily.  allopurinoL (ZYLOPRIM) 300 mg tablet Take 600 mg by mouth daily.  finasteride (PROSCAR) 5 mg tablet Take 5 mg by mouth daily.  vitamin E mixed/tocotrienol (VITAMIN E COMPLEX PO) Take 184 mg by mouth daily.  garlic 5,101 mg cap Take 1 Tablet by mouth daily.  multivitamin (Men's Multi-Vitamin) tablet Take 1 Tablet by mouth daily.  omega 3-dha-epa-fish oil (Fish Oil) 100-160-1,000 mg cap Take 1 Capsule by mouth daily.  ciprofloxacin HCl (CIPRO) 500 mg tablet Take 500 mg by mouth two (2) times a day. (Patient not taking: Reported on 2/18/2022)      ketorolac (TORADOL) 10 mg tablet Take 10 mg by mouth every six (6) hours as needed for Pain. (Patient not taking: Reported on 2/18/2022)       No current facility-administered medications on file prior to visit. Imaging:    CT Results (recent):  Results from Hospital Encounter encounter on 09/09/21    CT ABD PELV WO CONT    Narrative  EXAM: CT ABD PELV WO CONT    INDICATION: Abdominal pain    COMPARISON: 8/28/2021    CONTRAST:  None. TECHNIQUE:  Thin axial images were obtained through the abdomen and pelvis. Coronal and  sagittal reformats were generated. Oral contrast was not administered. CT dose  reduction was achieved through use of a standardized protocol tailored for this  examination and automatic exposure control for dose modulation.     The absence of intravenous contrast material reduces the sensitivity for  evaluation of the vasculature and solid organs. FINDINGS:  LOWER THORAX: No significant abnormality in the incidentally imaged lower chest.  LIVER: Several small hypodensities in the liver are too small to fully  characterize, but likely represent small cysts. BILIARY TREE: Gallbladder is within normal limits. CBD is not dilated. SPLEEN: within normal limits. PANCREAS: No focal abnormality. ADRENALS: Unremarkable. KIDNEYS/URETERS: There is a 1.4 cm cyst in the lower pole of the right kidney. There is a 2.4 cm cyst lower pole of left kidney. There is an incidental small  cortical calcification the superior left kidney. No hydronephrosis. No  nephrolithiasis. STOMACH: Unremarkable. SMALL BOWEL: No dilatation or wall thickening. COLON: No dilatation or wall thickening. A mild to moderate amount of stool is  seen in the colon. APPENDIX: Unremarkable. PERITONEUM: No ascites or pneumoperitoneum. RETROPERITONEUM: No lymphadenopathy or aortic aneurysm. REPRODUCTIVE ORGANS: Unremarkable. URINARY BLADDER: No mass or calculus. BONES: No destructive bone lesion. The patient is status post posterior fusion  from L3 to L5. ABDOMINAL WALL: No mass or hernia. ADDITIONAL COMMENTS: N/A    Impression  No evidence of acute process. MRI Results (recent):  Results from [place] encounter on 08/27/21    MRI CERV SPINE WO CONT    Narrative  EXAM: MRI CERV SPINE WO CONT  Clinical history: Cervical radiculopathy  INDICATION: . Cervical radiculopathy    COMPARISON: None    TECHNIQUE: MR imaging of the cervical spine was performed using the following  sequences: sagittal T1, T2, STIR;  axial T2, T1.    CONTRAST:  None. FINDINGS:    There is congenital narrowing of the cervical canal. B cervical lordosis. Vertebral body heights are maintained. Discogenic degenerative changes. The craniocervical junction is intact.  The course, caliber, and signal intensity  of the spinal cord are normal.    The paraspinal soft tissues are within normal limits. C2-C3: Facet arthropathy is mild. Disc bulge/osteophyte. Mild canal stenosis. Foramina are patent    C3-C4: Mild facet arthropathy. Disc bulge/osteophyte. Mild canal stenosis. Moderate bilateral foraminal stenoses. C4-C5: Facet arthropathy is slightly greater on the left. Disc desiccation and  disc bulge. Moderate canal and bilateral foraminal stenoses. C5-C6: Mild facet arthropathy. Disc bulge/osteophyte. Mild canal, moderate to  severe right and moderate left foraminal stenosis. C6-C7: Disc desiccation. Mild facet arthropathy. Canal is patent. Mild to  moderate left foraminal stenosis. C7-T1: Disc protrusion/osteophyte at the right neural foramen. Mild canal  stenosis. Moderate to severe right foraminal stenosis. There is incidentally noted lipoma in the pericervical tissue on the right. Impression  Multilevel minimal degenerative change with moderate congenital narrowing  cervical canal.  Moderate canal and bilateral foraminal stenosis at C4-5. Mild canal, moderate to severe right and moderate left foraminal stenosis at  C5-6. Mild canal stenoses at C3-4, C7-T1. Moderate to severe foraminal stenosis at C7-T1. IR Results (recent):  No results found for this or any previous visit. VAS/US Results (recent):  No results found for this or any previous visit. Reviewed records in "[employer] and [employer] tab today    Lab Review     No visits with results within 3 Month(s) from this visit. Latest known visit with results is:   No results displayed because visit has over 200 results. Objective:       Exam:  Visit Vitals  BP (!) 140/88 (BP 1 Location: Left arm, BP Patient Position: Sitting, BP Cuff Size: Adult)   Pulse 62   Wt 81.9 kg (180 lb 8 oz)   SpO2 98%   BMI 28.27 kg/m²     Gen: Awake, alert, follows commands  Appropriate appearance, normal speech.   Oriented to all spheres. No visual field defect on confrontation exam.  Full eyes movement, with no nystagmus, no diplopia, no ptosis. Normal gag and swallow. All remaining cranial nerves were normal  Motor function: 5/5 in all extremities  (+) tightness and tenderness of L trapezius muscle  Sensory: intact to LT, PP and JPS  DTRs trace UE, trace knees, absent ankles(-) Babinski  Good FTN and HTS   Gait: Able to stand without using arms. Able to stand on toes and heels. Stable gait    Assessment:       ICD-10-CM ICD-9-CM    1. Guillain Barré syndrome (HCC)  G61.0 357.0    2. Neck pain  M54.2 723.1        Guillain [place] syndrome    L neck pain, more likely musculoskeletal strain     Lower back pain with history of  L3- L5 Lower back surgery May 17, 2021    Plan:   1.  I had a long discussion with patient and daugther. Discussed diagnosis, prognosis, pathophysiology and available treatment. Reviewed test results. All questions were answered. 2. Discussed patient now has reflex in both knees suggestive of continued improvement of his nerves  3. Trial of Flexeril 10 mg QHS with Naproxen and OTC pain cream to address neck pain  4. Discussed that his R anterolateral leg numbness and lower back pain is more likely related to history of his lower back surgery. Follow up with his ortho Dr Roxana Ellis regarding lower back issues  5. Continue home exercises      Follow-up and Dispositions    · Return if symptoms worsen or fail to improve.                Sanford Sevilla MD  Diplomate, American Board of Psychiatry and Neurology  Diplomate, Neuromuscular Medicine  Diplomate, American Board of Electrodiagnostic Medicine

## 2022-03-18 PROBLEM — G61.0 GUILLAIN BARRÉ SYNDROME (HCC): Status: ACTIVE | Noted: 2021-09-09

## 2022-03-19 PROBLEM — M48.062 LUMBAR STENOSIS WITH NEUROGENIC CLAUDICATION: Status: ACTIVE | Noted: 2021-05-17

## 2022-04-02 NOTE — ED PROVIDER NOTES
Please note that this dictation was completed with [employer], the computer voice recognition software.  Quite often unanticipated grammatical, syntax, homophones, and other interpretive errors are inadvertently transcribed by the computer software.  Please disregard these errors.  Please excuse any errors that have escaped final proofreading.    'abd distended/ swelling x 3 d; was fine 4d ago'  Last po at noon/ 'ham and cheese sandwich'; last normal BM this am (no blood)  No N/V  Afebrile  Min achiness  'last time this happened I had gotten anesthesia, developed an Ileus'/ denies recent anesthesia;     59-year-old male past medical history markable for anemia, BPH, history of gout, hypertension, low vitamin D, and MSSA of the naris presents the ER complaining of \"abdominal distention swelling x3 days. Patient states \"4 days ago everything was fine 3 days ago my belly started swelling. Patient states he is tolerating p.o. normally still last had a ham and cheese sandwich for lunch at noon earlier today. Patient states he had a normal bowel movement earlier this morning. Patient adds he also is experiencing a little bit of right arm radiculopathy had an MRI recently of his spine secondary to a spine surgery (Dr. Evelyne Leonardo). Patient adds \"not where it is much about my arms Dr. Kieran Cool taking care of that and worried more about my belly. Has had this happen before last time it was after anesthesia to develop something they called an ileus. \"  Patient denies overt abdominal pain currently said \"just feels uncomfortable. \"  We discussed checking labs getting some images patient agrees with these plans.     pt denies HA, vison changes, diff swallowing, CP, SOB, F/Ch, N/V, D/Cons or other current systemic complaints    Social/ PSH reviewed in EMR    EMR Chart Reviewed               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/06/2021    [place]       Past Surgical History:   Procedure Laterality Date    HX CATARACT REMOVAL Right     HX COLONOSCOPY           Family History:   Problem Relation Age of Onset    Anesth Problems Neg Hx     Clotting Disorder Neg Hx        Social History     Socioeconomic History    Marital status:      Spouse name: Not on file    Number of children: Not on file    Years of education: Not on file    Highest education level: Not on file   Occupational History    Not on file   Tobacco Use    Smoking status: Never Smoker    Smokeless tobacco: Never Used   Substance and Sexual Activity    Alcohol use: Yes     Comment: social    Drug use: Never    Sexual activity: Not on file   Other Topics Concern    Not on file   Social History Narrative    Not on file     Social Determinants of Health     Financial Resource Strain:     Difficulty of Paying Living Expenses:    Food Insecurity:     Worried About Running Out of Food in the Last Year:     Ran Out of Food in the Last Year:    Transportation Needs:     Lack of Transportation (Medical):  Lack of Transportation (Non-Medical):    Physical Activity:     Days of Exercise per Week:     Minutes of Exercise per Session:    Stress:     Feeling of Stress :    Social Connections:     Frequency of Communication with Friends and Family:     Frequency of Social Gatherings with Friends and Family:     Attends Gnosticist Services:     Active Member of Clubs or Organizations:     Attends Club or Organization Meetings:     Marital Status:    Intimate Partner Violence:     Fear of Current or Ex-Partner:     Emotionally Abused:     Physically Abused:     Sexually Abused: ALLERGIES: Morphine    Review of Systems   Constitutional: Negative for appetite change, chills and fever. HENT: Negative for trouble swallowing and voice change.     Eyes: Negative for visual disturbance. Respiratory: Negative for cough, chest tightness and shortness of breath. Cardiovascular: Negative for chest pain and palpitations. Gastrointestinal: Positive for abdominal distention and abdominal pain. Negative for constipation, diarrhea, nausea and vomiting. Genitourinary: Negative for dysuria. Musculoskeletal: Negative for back pain. Skin: Negative for rash. Neurological: Negative for seizures, facial asymmetry and speech difficulty. Psychiatric/Behavioral: Negative for confusion. All other systems reviewed and are negative. There were no vitals filed for this visit. Physical Exam  Vitals and nursing note reviewed. Constitutional:       General: He is not in acute distress. Appearance: Normal appearance. He is well-developed. He is not ill-appearing, toxic-appearing or diaphoretic. Comments: NAD, AxOx4, speaking in complete sentences       HENT:      Head: Normocephalic and atraumatic. Right Ear: External ear normal.      Left Ear: External ear normal.      Mouth/Throat:      Pharynx: No oropharyngeal exudate. Eyes:      General:         Right eye: No discharge. Left eye: No discharge. Conjunctiva/sclera: Conjunctivae normal.      Pupils: Pupils are equal, round, and reactive to light. Cardiovascular:      Rate and Rhythm: Normal rate and regular rhythm. Pulses: Normal pulses. Heart sounds: Normal heart sounds. No murmur heard. No friction rub. No gallop. Pulmonary:      Effort: Pulmonary effort is normal. No respiratory distress. Breath sounds: Normal breath sounds. No stridor. No wheezing, rhonchi or rales. Chest:      Chest wall: No tenderness. Abdominal:      General: Abdomen is protuberant. Bowel sounds are normal. There is distension. There is no abdominal bruit. There are no signs of injury. Palpations: Abdomen is soft. There is no mass. Tenderness:  There is no abdominal tenderness. There is no guarding or rebound. Hernia: No hernia is present. Comments: Min diffuse ttp/ neg peritoneal signs; Genitourinary:     Comments: Pt denies urinary/ Testicular/ scrotal or penile  complaints  Musculoskeletal:         General: No swelling, tenderness, deformity or signs of injury. Normal range of motion. Cervical back: Normal range of motion and neck supple. Right lower leg: No edema. Left lower leg: No edema. Lymphadenopathy:      Cervical: No cervical adenopathy. Skin:     General: Skin is warm and dry. Capillary Refill: Capillary refill takes less than 2 seconds. Coloration: Skin is not jaundiced or pale. Findings: No bruising, erythema, lesion or rash. Neurological:      General: No focal deficit present. Mental Status: He is alert and oriented to person, place, and time. Cranial Nerves: No cranial nerve deficit. Sensory: No sensory deficit. Motor: No weakness. Coordination: Coordination normal.      Gait: Gait normal.      Deep Tendon Reflexes: Reflexes normal.          MDM       Procedures    Chief Complaint   Patient presents with    Abdominal Pain    Arm Pain       7:25 PM  The patients presenting problems have been discussed, and they are in agreement with the care plan formulated and outlined with them. I have encouraged them to ask questions as they arise throughout their visit. MEDICATIONS GIVEN:  Medications - No data to display    LABS REVIEWED:  Labs Reviewed - No data to display    RADIOLOGY RESULTS:  The following have been ordered and reviewed:  _____________________________________________________________________  _____________________________________________________________________    EKG interpretation:   Rhythm: normal sinus rhythm; and regular .  Rate (approx.): 76; Axis: normal; P wave: normal; QRS interval: normal ; ST/T wave: normal; Negative acute significant segmental elevations/ compared to study dated 08/18/2021    PROCEDURES:        CONSULTATIONS:       PROGRESS NOTES:      DIAGNOSIS:    1. Constipation, unspecified constipation type    2. Abdominal distention    3. Generalized abdominal tenderness without rebound tenderness        PLAN:  1-go-lytely;     ED COURSE: The patients hospital course has been uncomplicated. 8:47 PM  'feeling better'; awaiting results;        10:16 PM Pt given sweat pants instructions/ agrees w/ plans; Mathew Corona's  results have been reviewed with him. He has been counseled regarding his diagnosis. He verbally conveys understanding and agreement of the signs, symptoms, diagnosis, treatment and prognosis and additionally agrees to Call/ Arrange follow up as recommended with Dr. Emma Corona MD in 24 - 48 hours. He also agrees with the care-plan and conveys that all of his questions have been answered. I have also put together some discharge instructions for him that include: 1) educational information regarding their diagnosis, 2) how to care for their diagnosis at home, as well a 3) list of reasons why they would want to return to the ED prior to their follow-up appointment, should their condition change or for concerns. no

## 2023-05-15 RX ORDER — CYCLOBENZAPRINE HCL 10 MG
1 TABLET ORAL NIGHTLY
COMMUNITY
Start: 2022-02-18

## 2023-05-15 RX ORDER — DOXAZOSIN 8 MG/1
8 TABLET ORAL DAILY
COMMUNITY

## 2023-05-15 RX ORDER — FINASTERIDE 5 MG/1
5 TABLET, FILM COATED ORAL DAILY
COMMUNITY

## 2023-05-15 RX ORDER — POLYETHYLENE GLYCOL 3350 17 G/17G
17 POWDER, FOR SOLUTION ORAL 2 TIMES DAILY PRN
COMMUNITY
Start: 2021-09-16

## 2023-05-15 RX ORDER — NAPROXEN SODIUM 220 MG
220 TABLET ORAL 2 TIMES DAILY WITH MEALS
COMMUNITY

## 2023-05-15 RX ORDER — SPIRONOLACTONE 25 MG/1
25 TABLET ORAL DAILY
COMMUNITY

## 2023-05-15 RX ORDER — LOSARTAN POTASSIUM 100 MG/1
100 TABLET ORAL DAILY
COMMUNITY

## 2023-05-15 RX ORDER — ALLOPURINOL 300 MG/1
600 TABLET ORAL DAILY
COMMUNITY

## 2023-05-15 RX ORDER — AMLODIPINE BESYLATE 10 MG/1
10 TABLET ORAL DAILY
COMMUNITY

## 2023-05-15 RX ORDER — AMOXICILLIN 250 MG
1 CAPSULE ORAL 2 TIMES DAILY
COMMUNITY
Start: 2021-09-16

## 2025-02-17 NOTE — ANESTHESIA PREPROCEDURE EVALUATION
Pre-procedure checklist reviewed. Relevant Problems   No relevant active problems       Anesthetic History   No history of anesthetic complications            Review of Systems / Medical History  Patient summary reviewed, nursing notes reviewed and pertinent labs reviewed    Pulmonary  Within defined limits                 Neuro/Psych   Within defined limits           Cardiovascular  Within defined limits  Hypertension                   GI/Hepatic/Renal  Within defined limits              Endo/Other  Within defined limits           Other Findings              Physical Exam    Airway  Mallampati: II  TM Distance: 4 - 6 cm  Neck ROM: normal range of motion   Mouth opening: Normal     Cardiovascular    Rhythm: regular  Rate: normal         Dental  No notable dental hx       Pulmonary  Breath sounds clear to auscultation               Abdominal         Other Findings            Anesthetic Plan    ASA: 2  Anesthesia type: general            Anesthetic plan and risks discussed with: Patient

## (undated) DEVICE — OPTIFOAM GENTLE SA, POSTOP, 4X10: Brand: MEDLINE

## (undated) DEVICE — 1200 GUARD II KIT W/5MM TUBE W/O VAC TUBE: Brand: GUARDIAN

## (undated) DEVICE — NDL SPNE QNCKE 18GX3.5IN LF --

## (undated) DEVICE — DRAPE,UTILITY,TAPE,15X26,STERILE: Brand: MEDLINE

## (undated) DEVICE — COVER,TABLE,60X90,STERILE: Brand: MEDLINE

## (undated) DEVICE — THE MILL DISPOSABLE - MEDIUM

## (undated) DEVICE — SUTURE VCRL SZ 2-0 L36IN ABSRB UD L36MM CT-1 1/2 CIR J945H

## (undated) DEVICE — FLOSEAL HEMOSTATIC MATRIX, 10ML: Brand: FLOSEAL HEMOSTATIC MATRIX

## (undated) DEVICE — SUTURE VCRL SZ 0 L36IN ABSRB UD L36MM CT-1 1/2 CIR J946H

## (undated) DEVICE — STERILE POLYISOPRENE POWDER-FREE SURGICAL GLOVES: Brand: PROTEXIS

## (undated) DEVICE — MAGNETIC INSTR DRAPE 20X16: Brand: MEDLINE INDUSTRIES, INC.

## (undated) DEVICE — SYR 50ML SLIP TIP NSAF LF STRL --

## (undated) DEVICE — CONTAINER,SPECIMEN,3OZ,OR STRL: Brand: MEDLINE

## (undated) DEVICE — JACKSON TABLE POSITIONER KIT: Brand: MEDLINE INDUSTRIES, INC.

## (undated) DEVICE — LAMINECTOMY RICHMOND-LF: Brand: MEDLINE INDUSTRIES, INC.

## (undated) DEVICE — GOWN,SIRUS,NONRNF,SETINSLV,XL,20/CS: Brand: MEDLINE

## (undated) DEVICE — PREP SKN CHLRAPRP APL 26ML STR --

## (undated) DEVICE — 3M™ TEGADERM™ TRANSPARENT FILM DRESSING FRAME STYLE, 1626, 4 IN X 4-3/4 IN (10 CM X 12 CM), 50/CT 4CT/CASE: Brand: 3M™ TEGADERM™

## (undated) DEVICE — TOTAL TRAY, 16FR 10ML SIL FOLEY, URN: Brand: MEDLINE

## (undated) DEVICE — SPONGE GZ W4XL4IN COT 12 PLY TYP VII WVN C FLD DSGN

## (undated) DEVICE — 1010 S-DRAPE TOWEL DRAPE 10/BX: Brand: STERI-DRAPE™

## (undated) DEVICE — ALCOHOL RUBBING ISO 16OZ 70%

## (undated) DEVICE — SUTURE NONABSORBABLE MONOFILAMENT 5-0 CV-6 TTC-9 24 IN GORTX 6K02A

## (undated) DEVICE — COVER LT HNDL PLAS RIG 1 PER PK

## (undated) DEVICE — ELECTRODE BLDE L4IN NONINSULATED EDGE

## (undated) DEVICE — INTENDED FOR TISSUE SEPARATION, AND OTHER PROCEDURES THAT REQUIRE A SHARP SURGICAL BLADE TO PUNCTURE OR CUT.: Brand: BARD-PARKER ® CARBON RIB-BACK BLADES

## (undated) DEVICE — TOOL 14MH30 LEGEND 14CM 3MM: Brand: MIDAS REX ™

## (undated) DEVICE — AEGIS 1" DISK 4MM HOLE, PEEL OPEN: Brand: MEDLINE

## (undated) DEVICE — SUTURE ABSORBABLE 3-0 PS-1 18 IN UD MONOCRYL + STRATAFIX SXMP1B102

## (undated) DEVICE — ADHESIVE SKIN CLOSURE 4X22 CM PREMIERPRO EXOFINFUSION DISP

## (undated) DEVICE — BAG BELONG PT PERS CLEAR HANDL

## (undated) DEVICE — COVER,MAYO STAND,XL,STERILE: Brand: MEDLINE

## (undated) DEVICE — 3M™ TEGADERM™ TRANSPARENT FILM DRESSING FRAME STYLE, 1624W, 2-3/8 IN X 2-3/4 IN (6 CM X 7 CM), 100/CT 4CT/CASE: Brand: 3M™ TEGADERM™

## (undated) DEVICE — SOLIDIFIER MEDC 1200ML -- CONVERT TO 356117

## (undated) DEVICE — SYR 20ML LL STRL LF --

## (undated) DEVICE — STERILE POLYISOPRENE POWDER-FREE SURGICAL GLOVES WITH EMOLLIENT COATING: Brand: PROTEXIS

## (undated) DEVICE — SUTURE STRATAFIX SPRL SZ 1 L14IN ABSRB VLT L48CM CTX 1/2 SXPD2B405

## (undated) DEVICE — CUFF RMFG BP INF SZ 11 DISP -- LAWSON OEM ITEM 238915

## (undated) DEVICE — SUTURE VCRL SZ 2-0 L27IN ABSRB UD L26MM CT-2 1/2 CIR J269H

## (undated) DEVICE — SYR 10ML LUER LOK 1/5ML GRAD --

## (undated) DEVICE — STRAP,POSITIONING,KNEE/BODY,FOAM,4X60": Brand: MEDLINE

## (undated) DEVICE — TIP CLEANER: Brand: VALLEYLAB

## (undated) DEVICE — ELECTRODE,RADIOTRANSLUCENT,FOAM,3PK: Brand: MEDLINE

## (undated) DEVICE — CANISTER, RIGID, 3000CC: Brand: MEDLINE INDUSTRIES, INC.

## (undated) DEVICE — CATH IV AUTOGRD BC PNK 20GA 25 -- INSYTE

## (undated) DEVICE — INFECTION CONTROL KIT SYS

## (undated) DEVICE — CATHETER IV 14GA L1.25IN TEF STR HUB INTROCAN SFTY

## (undated) DEVICE — SOL IRR STRL H2O 1000ML BTL --

## (undated) DEVICE — KIT COLON W/ 1.1OZ LUB AND 2 END

## (undated) DEVICE — SIMPLICITY FLUFF UNDERPAD 23X36, MODERATE: Brand: SIMPLICITY

## (undated) DEVICE — COVER,MAYO STAND,STERILE: Brand: MEDLINE

## (undated) DEVICE — DRAIN KT WND 10FR RND 400ML --

## (undated) DEVICE — C-ARM: Brand: UNBRANDED

## (undated) DEVICE — NEEDLE HYPO 18GA L1.5IN PNK S STL HUB POLYPR SHLD REG BVL

## (undated) DEVICE — Device

## (undated) DEVICE — CODMAN® SURGICAL PATTIES 3/4" X 3/4" (1.91CM X 1.91CM): Brand: CODMAN®

## (undated) DEVICE — (D)SENSOR RMFG 02 PULS OXMTR -- DISC BY MFR USE ITEM 133445

## (undated) DEVICE — TUBING, SUCTION, 1/4" X 12', STRAIGHT: Brand: MEDLINE

## (undated) DEVICE — 3M™ CUROS™ DISINFECTING CAP FOR NEEDLELESS CONNECTORS 270/CARTON 20 CARTONS/CASE CFF1-270: Brand: CUROS™

## (undated) DEVICE — BIPOLAR FORCEPS CORD: Brand: VALLEYLAB

## (undated) DEVICE — PREP KIT PEEL PTCH POVIDONE IOD

## (undated) DEVICE — ACCY PA100-A LEGEND LUB/DIFFUSER 4 PACK: Brand: MIDAS REX®

## (undated) DEVICE — DECANTER BAG 9": Brand: MEDLINE INDUSTRIES, INC.

## (undated) DEVICE — SUTURE VCRL SZ 1 L36IN ABSRB UD L36MM CT-1 1/2 CIR J947H

## (undated) DEVICE — DRAPE,REIN 53X77,STERILE: Brand: MEDLINE

## (undated) DEVICE — SPONGE LAP 18X18IN STRL -- 5/PK

## (undated) DEVICE — SET GRAV CK VLV NEEDLESS ST 3 GANGED 4WAY STPCOCK HI FLO 10